# Patient Record
Sex: FEMALE | Race: WHITE | NOT HISPANIC OR LATINO | Employment: OTHER | URBAN - METROPOLITAN AREA
[De-identification: names, ages, dates, MRNs, and addresses within clinical notes are randomized per-mention and may not be internally consistent; named-entity substitution may affect disease eponyms.]

---

## 2017-10-03 ENCOUNTER — TRANSCRIBE ORDERS (OUTPATIENT)
Dept: ADMINISTRATIVE | Facility: HOSPITAL | Age: 82
End: 2017-10-03

## 2017-10-03 DIAGNOSIS — Z12.31 VISIT FOR SCREENING MAMMOGRAM: Primary | ICD-10-CM

## 2017-10-04 ENCOUNTER — HOSPITAL ENCOUNTER (OUTPATIENT)
Dept: MAMMOGRAPHY | Facility: HOSPITAL | Age: 82
Discharge: HOME | End: 2017-10-04
Admitting: INTERNAL MEDICINE

## 2017-10-04 DIAGNOSIS — Z12.31 VISIT FOR SCREENING MAMMOGRAM: ICD-10-CM

## 2017-10-04 PROCEDURE — G0202 SCR MAMMO BI INCL CAD: HCPCS

## 2017-10-04 PROCEDURE — 77063 BREAST TOMOSYNTHESIS BI: CPT

## 2017-10-06 PROCEDURE — 77063 BREAST TOMOSYNTHESIS BI: CPT | Performed by: RADIOLOGY

## 2017-10-06 PROCEDURE — G0202 SCR MAMMO BI INCL CAD: HCPCS | Performed by: RADIOLOGY

## 2018-10-19 ENCOUNTER — TRANSCRIBE ORDERS (OUTPATIENT)
Dept: ADMINISTRATIVE | Facility: HOSPITAL | Age: 83
End: 2018-10-19

## 2018-10-19 DIAGNOSIS — Z12.31 VISIT FOR SCREENING MAMMOGRAM: Primary | ICD-10-CM

## 2018-11-21 ENCOUNTER — HOSPITAL ENCOUNTER (OUTPATIENT)
Dept: MAMMOGRAPHY | Facility: HOSPITAL | Age: 83
Discharge: HOME | End: 2018-11-21
Attending: INTERNAL MEDICINE | Admitting: INTERNAL MEDICINE

## 2018-11-21 DIAGNOSIS — Z12.31 VISIT FOR SCREENING MAMMOGRAM: ICD-10-CM

## 2018-11-21 PROCEDURE — 77067 SCR MAMMO BI INCL CAD: CPT | Performed by: RADIOLOGY

## 2018-11-21 PROCEDURE — 77067 SCR MAMMO BI INCL CAD: CPT

## 2018-11-21 PROCEDURE — 77063 BREAST TOMOSYNTHESIS BI: CPT | Performed by: RADIOLOGY

## 2018-11-21 PROCEDURE — 77063 BREAST TOMOSYNTHESIS BI: CPT

## 2019-01-15 ENCOUNTER — OFFICE VISIT (OUTPATIENT)
Dept: INTERNAL MEDICINE | Facility: CLINIC | Age: 84
End: 2019-01-15

## 2019-01-15 ENCOUNTER — CLINICAL SUPPORT (OUTPATIENT)
Dept: INTERNAL MEDICINE | Facility: CLINIC | Age: 84
End: 2019-01-15

## 2019-01-15 ENCOUNTER — LAB (OUTPATIENT)
Dept: LAB | Facility: HOSPITAL | Age: 84
End: 2019-01-15

## 2019-01-15 VITALS
HEIGHT: 64 IN | HEART RATE: 60 BPM | WEIGHT: 148 LBS | SYSTOLIC BLOOD PRESSURE: 136 MMHG | BODY MASS INDEX: 25.27 KG/M2 | DIASTOLIC BLOOD PRESSURE: 80 MMHG | TEMPERATURE: 97.4 F

## 2019-01-15 DIAGNOSIS — E53.8 B12 DEFICIENCY: ICD-10-CM

## 2019-01-15 DIAGNOSIS — E78.2 MIXED HYPERLIPIDEMIA: ICD-10-CM

## 2019-01-15 DIAGNOSIS — I10 BENIGN ESSENTIAL HYPERTENSION: ICD-10-CM

## 2019-01-15 DIAGNOSIS — N18.30 CKD (CHRONIC KIDNEY DISEASE), STAGE III (HCC): ICD-10-CM

## 2019-01-15 DIAGNOSIS — E11.9 DIABETES MELLITUS WITHOUT COMPLICATION (HCC): Primary | ICD-10-CM

## 2019-01-15 DIAGNOSIS — E11.9 DIABETES MELLITUS WITHOUT COMPLICATION (HCC): ICD-10-CM

## 2019-01-15 DIAGNOSIS — E55.9 VITAMIN D DEFICIENCY: ICD-10-CM

## 2019-01-15 LAB
ALBUMIN SERPL-MCNC: 4.62 G/DL (ref 3.2–4.8)
ALBUMIN/GLOB SERPL: 1.9 G/DL (ref 1.5–2.5)
ALP SERPL-CCNC: 74 U/L (ref 25–100)
ALT SERPL W P-5'-P-CCNC: 15 U/L (ref 7–40)
ANION GAP SERPL CALCULATED.3IONS-SCNC: 8 MMOL/L (ref 3–11)
ARTICHOKE IGE QN: 154 MG/DL (ref 0–130)
AST SERPL-CCNC: 17 U/L (ref 0–33)
BILIRUB SERPL-MCNC: 0.3 MG/DL (ref 0.3–1.2)
BUN BLD-MCNC: 19 MG/DL (ref 9–23)
BUN/CREAT SERPL: 17.8 (ref 7–25)
CALCIUM SPEC-SCNC: 10.2 MG/DL (ref 8.7–10.4)
CHLORIDE SERPL-SCNC: 103 MMOL/L (ref 99–109)
CHOLEST SERPL-MCNC: 207 MG/DL (ref 0–200)
CO2 SERPL-SCNC: 25 MMOL/L (ref 20–31)
CREAT BLD-MCNC: 1.07 MG/DL (ref 0.6–1.3)
GFR SERPL CREATININE-BSD FRML MDRD: 49 ML/MIN/1.73
GLOBULIN UR ELPH-MCNC: 2.4 GM/DL
GLUCOSE BLD-MCNC: 112 MG/DL (ref 70–100)
HBA1C MFR BLD: 7.2 % (ref 4.8–5.6)
HDLC SERPL-MCNC: 51 MG/DL (ref 40–60)
POTASSIUM BLD-SCNC: 4.8 MMOL/L (ref 3.5–5.5)
PROT SERPL-MCNC: 7 G/DL (ref 5.7–8.2)
SODIUM BLD-SCNC: 136 MMOL/L (ref 132–146)
TRIGL SERPL-MCNC: 187 MG/DL (ref 0–150)
VIT B12 BLD-MCNC: 527 PG/ML (ref 211–911)

## 2019-01-15 PROCEDURE — 80053 COMPREHEN METABOLIC PANEL: CPT

## 2019-01-15 PROCEDURE — 96372 THER/PROPH/DIAG INJ SC/IM: CPT | Performed by: INTERNAL MEDICINE

## 2019-01-15 PROCEDURE — 82607 VITAMIN B-12: CPT

## 2019-01-15 PROCEDURE — 36415 COLL VENOUS BLD VENIPUNCTURE: CPT

## 2019-01-15 PROCEDURE — 99214 OFFICE O/P EST MOD 30 MIN: CPT | Performed by: INTERNAL MEDICINE

## 2019-01-15 PROCEDURE — 83036 HEMOGLOBIN GLYCOSYLATED A1C: CPT

## 2019-01-15 PROCEDURE — 80061 LIPID PANEL: CPT

## 2019-01-15 RX ORDER — HYDROXYZINE HYDROCHLORIDE 25 MG/1
25 TABLET, FILM COATED ORAL 3 TIMES DAILY PRN
Qty: 90 TABLET | Refills: 3 | Status: SHIPPED | OUTPATIENT
Start: 2019-01-15 | End: 2019-07-25 | Stop reason: SDUPTHER

## 2019-01-15 RX ORDER — ASPIRIN 81 MG/1
81 TABLET ORAL DAILY
COMMUNITY

## 2019-01-15 RX ORDER — PROMETHAZINE HYDROCHLORIDE 25 MG/1
TABLET ORAL
COMMUNITY
Start: 2018-11-17 | End: 2020-01-09 | Stop reason: SDUPTHER

## 2019-01-15 RX ORDER — LISINOPRIL 2.5 MG/1
2.5 TABLET ORAL DAILY
COMMUNITY
Start: 2018-12-18 | End: 2019-07-25 | Stop reason: SDUPTHER

## 2019-01-15 RX ORDER — TIZANIDINE 2 MG/1
4 TABLET ORAL NIGHTLY PRN
Qty: 30 TABLET | Refills: 5 | Status: SHIPPED | OUTPATIENT
Start: 2019-01-15 | End: 2019-01-15

## 2019-01-15 RX ORDER — ALPRAZOLAM 0.5 MG/1
TABLET ORAL
COMMUNITY
Start: 2019-01-04 | End: 2019-04-29 | Stop reason: SDUPTHER

## 2019-01-15 RX ORDER — CHOLECALCIFEROL (VITAMIN D3) 125 MCG
CAPSULE ORAL
COMMUNITY

## 2019-01-15 RX ORDER — ATORVASTATIN CALCIUM 10 MG/1
10 TABLET, FILM COATED ORAL 3 TIMES WEEKLY
COMMUNITY
Start: 2018-11-17 | End: 2019-07-05 | Stop reason: SDUPTHER

## 2019-01-15 RX ORDER — HYDROXYZINE HYDROCHLORIDE 25 MG/1
25 TABLET, FILM COATED ORAL 3 TIMES DAILY PRN
COMMUNITY
End: 2019-01-15 | Stop reason: SDUPTHER

## 2019-01-15 RX ORDER — TIZANIDINE 2 MG/1
2 TABLET ORAL NIGHTLY PRN
COMMUNITY
End: 2019-01-15 | Stop reason: SDUPTHER

## 2019-01-15 RX ORDER — CYANOCOBALAMIN 1000 UG/ML
1000 INJECTION, SOLUTION INTRAMUSCULAR; SUBCUTANEOUS
Status: DISCONTINUED | OUTPATIENT
Start: 2019-01-15 | End: 2023-02-13

## 2019-01-15 RX ORDER — TIZANIDINE 4 MG/1
4 TABLET ORAL NIGHTLY
Qty: 30 TABLET | Refills: 5 | Status: SHIPPED | OUTPATIENT
Start: 2019-01-15 | End: 2019-07-25 | Stop reason: SDUPTHER

## 2019-01-15 RX ORDER — ATENOLOL 50 MG/1
50 TABLET ORAL DAILY
COMMUNITY
Start: 2018-12-18 | End: 2019-07-25 | Stop reason: SDUPTHER

## 2019-01-15 RX ADMIN — CYANOCOBALAMIN 1000 MCG: 1000 INJECTION, SOLUTION INTRAMUSCULAR; SUBCUTANEOUS at 11:10

## 2019-01-15 NOTE — PROGRESS NOTES
[facility]     Tessa Adler  1935   [id number]      Patient Care Team:  Kishore Grande MD as PCP - General  Kishore Grande MD as PCP - Family Medicine    Chief Complaint::   Chief Complaint   Patient presents with   • Follow-up     fasting 6 month    • Diabetes        HPI  Pat is now 83.  She comes in for follow up of her chronic pancreatitis, HTN, hyperlipidemia, B12, deficieny, DM, and CKD.  She continue to experience abdominal pain.  She also has LE muscle spasms, which she attributes to her statin.  She takes tizanidine, which seems to help, but she would like to increase the dose.  She continues to have problems with her hands, had injections recently and feels better.      Chronic Conditions:      Patient Active Problem List   Diagnosis   • B12 deficiency   • Mixed hyperlipidemia   • Diabetes mellitus without complication (CMS/HCC)   • GERD without esophagitis   • Benign essential hypertension   • ASHD (arteriosclerotic heart disease)   • Vitamin D deficiency   • CKD (chronic kidney disease), stage III (CMS/HCC)        Past Medical History:   Diagnosis Date   • ASHD (arteriosclerotic heart disease)    • B12 deficiency    • Benign essential hypertension    • CKD (chronic kidney disease), stage III (CMS/HCC)    • Diabetes mellitus without complication (CMS/HCC)    • GERD without esophagitis    • Mixed hyperlipidemia    • Vitamin D deficiency        Past Surgical History:   Procedure Laterality Date   • CARPAL TUNNEL RELEASE  1989   • CATARACT EXTRACTION     • CHOLECYSTECTOMY  2001   • CORONARY ANGIOPLASTY WITH STENT PLACEMENT  2010    PTCA, stents, LAD   • HEMORRHOIDECTOMY  1965   • HYSTERECTOMY  1968    AGE 33   • OOPHORECTOMY Bilateral     AGE 33       Family History   Problem Relation Age of Onset   • Breast cancer Neg Hx    • Ovarian cancer Neg Hx        Social History     Socioeconomic History   • Marital status:      Spouse name: Not on file   • Number of  children: Not on file   • Years of education: Not on file   • Highest education level: Not on file   Social Needs   • Financial resource strain: Not on file   • Food insecurity - worry: Not on file   • Food insecurity - inability: Not on file   • Transportation needs - medical: Not on file   • Transportation needs - non-medical: Not on file   Occupational History   • Not on file   Tobacco Use   • Smoking status: Never Smoker   • Smokeless tobacco: Never Used   Substance and Sexual Activity   • Alcohol use: No     Frequency: Never   • Drug use: No   • Sexual activity: Defer   Other Topics Concern   • Not on file   Social History Narrative   • Not on file       Allergies   Allergen Reactions   • Clarithromycin Unknown (See Comments)     unknown   • Codeine Unknown (See Comments)     unknown   • Dobutamine Unknown (See Comments)     unknown   • Iodinated Diagnostic Agents Unknown (See Comments)     unknown   • Ioversol Swelling     Tongue swelling   • Pantoprazole Sodium Unknown (See Comments)     unknown   • Perflutren Lipid Microsphere Unknown (See Comments)     unknown   • Sulfa Antibiotics Unknown (See Comments)     unknown   • Trimethoprim Unknown (See Comments)     unknown         Current Outpatient Medications:   •  ALPRAZolam (XANAX) 0.5 MG tablet, Take one at bedtime, Disp: , Rfl:   •  aspirin 81 MG EC tablet, Take 81 mg by mouth Daily. Take one daily, Disp: , Rfl:   •  atenolol (TENORMIN) 50 MG tablet, Take one daily, Disp: , Rfl:   •  atorvastatin (LIPITOR) 10 MG tablet, Take one tablet three times a week, Disp: , Rfl:   •  Cholecalciferol (VITAMIN D3) 2000 units tablet, Take  by mouth. Take one daily, Disp: , Rfl:   •  Cyanocobalamin 1000 MCG/ML kit, Inject  as directed. Take injection once a month, Disp: , Rfl:   •  hydrOXYzine (ATARAX) 25 MG tablet, Take 1 tablet by mouth 3 (Three) Times a Day As Needed for Itching., Disp: 90 tablet, Rfl: 3  •  lisinopril (PRINIVIL,ZESTRIL) 2.5 MG tablet, Take one daily,  "Disp: , Rfl:   •  metFORMIN (GLUCOPHAGE) 1000 MG tablet, Take one twice daily, Disp: , Rfl:   •  promethazine (PHENERGAN) 25 MG tablet, Take as needed for nausea and vomiting, Disp: , Rfl:   •  tiZANidine (ZANAFLEX) 4 MG tablet, Take 1 tablet by mouth Every Night., Disp: 30 tablet, Rfl: 5    Current Facility-Administered Medications:   •  cyanocobalamin injection 1,000 mcg, 1,000 mcg, Intramuscular, Q28 Days, Kishore Grande MD, 1,000 mcg at 01/15/19 1110    Review of Systems   Constitutional: Negative.  Negative for chills, fatigue and fever.   HENT: Negative for congestion, ear pain and sinus pressure.    Respiratory: Negative.  Negative for cough, chest tightness, shortness of breath and wheezing.    Cardiovascular: Negative.  Negative for chest pain and palpitations.   Gastrointestinal: Negative for abdominal pain, blood in stool, constipation and diarrhea.   Musculoskeletal: Positive for arthralgias and myalgias.   Skin: Negative for color change.   Allergic/Immunologic: Negative for environmental allergies.   Neurological: Negative for dizziness, speech difficulty and headache.   Psychiatric/Behavioral: Negative for decreased concentration. The patient is not nervous/anxious.         Vital Signs  Vitals:    01/15/19 0936   BP: 136/80   BP Location: Left arm   Patient Position: Sitting   Cuff Size: Adult   Pulse: 60   Temp: 97.4 °F (36.3 °C)   TempSrc: Temporal   Weight: 67.1 kg (148 lb)   Height: 162.6 cm (64\")   PainSc: 0-No pain       Physical Exam   Constitutional: She is oriented to person, place, and time. She appears well-developed and well-nourished.   HENT:   Head: Normocephalic and atraumatic.   Cardiovascular: Normal rate, regular rhythm and normal heart sounds.   No murmur heard.  Pulmonary/Chest: Effort normal and breath sounds normal.   Abdominal: Soft. Bowel sounds are normal. There is no tenderness.   Musculoskeletal:   She has thickened tendon sheaths on the palmar surfaces of both " hands.    Neurological: She is alert and oriented to person, place, and time.   Psychiatric: She has a normal mood and affect.   Vitals reviewed.     Procedures      Assessment/Plan:   1)  Diabetes:  A1c pending  2)  Chronic pancreatitis per GI  3)  HTN controlled  4) Hyperlipidemia  5)  Depression  6)  Fibromyalgia  7)  CKD stage 3      Plan of care reviewed with patient at the conclusion of today's visit. Education was provided regarding diagnosis, management, and any prescribed or recommended OTC medications.Patient verbalizes understanding of and agreement with management plan.         Kishore Grande MD

## 2019-02-13 ENCOUNTER — CLINICAL SUPPORT (OUTPATIENT)
Dept: INTERNAL MEDICINE | Facility: CLINIC | Age: 84
End: 2019-02-13

## 2019-02-13 DIAGNOSIS — E53.8 B12 DEFICIENCY: ICD-10-CM

## 2019-02-13 PROCEDURE — 96372 THER/PROPH/DIAG INJ SC/IM: CPT | Performed by: INTERNAL MEDICINE

## 2019-02-13 RX ADMIN — CYANOCOBALAMIN 1000 MCG: 1000 INJECTION, SOLUTION INTRAMUSCULAR; SUBCUTANEOUS at 12:52

## 2019-03-13 ENCOUNTER — CLINICAL SUPPORT (OUTPATIENT)
Dept: INTERNAL MEDICINE | Facility: CLINIC | Age: 84
End: 2019-03-13

## 2019-03-13 PROCEDURE — 96372 THER/PROPH/DIAG INJ SC/IM: CPT | Performed by: INTERNAL MEDICINE

## 2019-03-13 RX ADMIN — CYANOCOBALAMIN 1000 MCG: 1000 INJECTION, SOLUTION INTRAMUSCULAR; SUBCUTANEOUS at 11:15

## 2019-04-10 ENCOUNTER — CLINICAL SUPPORT (OUTPATIENT)
Dept: INTERNAL MEDICINE | Facility: CLINIC | Age: 84
End: 2019-04-10

## 2019-04-10 DIAGNOSIS — E53.8 B12 DEFICIENCY: ICD-10-CM

## 2019-04-10 PROCEDURE — 96372 THER/PROPH/DIAG INJ SC/IM: CPT | Performed by: INTERNAL MEDICINE

## 2019-04-10 RX ADMIN — CYANOCOBALAMIN 1000 MCG: 1000 INJECTION, SOLUTION INTRAMUSCULAR; SUBCUTANEOUS at 11:11

## 2019-04-29 RX ORDER — ALPRAZOLAM 0.5 MG/1
0.5 TABLET ORAL NIGHTLY PRN
Qty: 30 TABLET | Refills: 1 | Status: SHIPPED | OUTPATIENT
Start: 2019-04-29 | End: 2019-07-05 | Stop reason: SDUPTHER

## 2019-04-29 NOTE — TELEPHONE ENCOUNTER
Last seen-   01/15/2019    Next appointment-  07/05/2019    Last approved-   01/25/2019    Shadi-   viral

## 2019-05-08 ENCOUNTER — CLINICAL SUPPORT (OUTPATIENT)
Dept: INTERNAL MEDICINE | Facility: CLINIC | Age: 84
End: 2019-05-08

## 2019-05-08 DIAGNOSIS — E53.8 B12 DEFICIENCY: ICD-10-CM

## 2019-05-08 PROCEDURE — 96372 THER/PROPH/DIAG INJ SC/IM: CPT | Performed by: INTERNAL MEDICINE

## 2019-05-08 RX ADMIN — CYANOCOBALAMIN 1000 MCG: 1000 INJECTION, SOLUTION INTRAMUSCULAR; SUBCUTANEOUS at 12:28

## 2019-05-22 ENCOUNTER — HOSPITAL ENCOUNTER (OUTPATIENT)
Dept: GENERAL RADIOLOGY | Facility: HOSPITAL | Age: 84
Discharge: HOME | End: 2019-05-22
Admitting: NURSE PRACTITIONER

## 2019-05-22 ENCOUNTER — OFFICE VISIT (OUTPATIENT)
Dept: INTERNAL MEDICINE | Facility: CLINIC | Age: 84
End: 2019-05-22

## 2019-05-22 VITALS
SYSTOLIC BLOOD PRESSURE: 122 MMHG | HEART RATE: 68 BPM | BODY MASS INDEX: 25.61 KG/M2 | HEIGHT: 64 IN | DIASTOLIC BLOOD PRESSURE: 80 MMHG | WEIGHT: 150 LBS

## 2019-05-22 DIAGNOSIS — S80.211A ABRASION OF RIGHT KNEE, INITIAL ENCOUNTER: ICD-10-CM

## 2019-05-22 DIAGNOSIS — M25.561 ACUTE PAIN OF RIGHT KNEE: Primary | ICD-10-CM

## 2019-05-22 DIAGNOSIS — M25.561 ACUTE PAIN OF RIGHT KNEE: ICD-10-CM

## 2019-05-22 PROCEDURE — 73560 X-RAY EXAM OF KNEE 1 OR 2: CPT

## 2019-05-22 PROCEDURE — 99213 OFFICE O/P EST LOW 20 MIN: CPT | Performed by: NURSE PRACTITIONER

## 2019-05-22 NOTE — PROGRESS NOTES
Tessa Adler  1935  [id number]  Patient Care Team:  Kishore Grande MD as PCP - General  Kishore Grande MD as PCP - Family Medicine    Tessa Adler is a pleasant 83 y.o. female who presents for evaluation of Fall (x 1 week); Wound Infection (on elbow and knee, not healing); and Knee Pain (increased after fall)      Chief Complaint   Patient presents with   • Fall     x 1 week   • Wound Infection     on elbow and knee, not healing   • Knee Pain     increased after fall       HPI:   Right knee abrasion s/p fall 1 week ago, knee gave out and she went down on knee on [place].  Sees ortho for shots which she is due for.  Last one 3 yrs ago.  Now abrasion seems infected, area of erythema spreading x 3 days, no drainage.  Using peroxide, neosporin and Band-Aid.  Using tylenol.  Also has mild abrasion right elbow, healing.    Right knee bruised, painful to touch and ambulate, swelling has improved.      Past Medical History:   Diagnosis Date   • ASHD (arteriosclerotic heart disease)    • B12 deficiency    • Benign essential hypertension    • CKD (chronic kidney disease), stage III (CMS/HCC)    • Diabetes mellitus without complication (CMS/HCC)    • GERD without esophagitis    • Mixed hyperlipidemia    • Vitamin D deficiency        Past Surgical History:   Procedure Laterality Date   • CARPAL TUNNEL RELEASE  1989   • CATARACT EXTRACTION     • CHOLECYSTECTOMY  2001   • CORONARY ANGIOPLASTY WITH STENT PLACEMENT  2010    PTCA, stents, LAD   • HEMORRHOIDECTOMY  1965   • HYSTERECTOMY  1968    AGE 33   • OOPHORECTOMY Bilateral     AGE 33       Family History   Problem Relation Age of Onset   • Breast cancer Neg Hx    • Ovarian cancer Neg Hx        Social History     Tobacco Use   Smoking Status Never Smoker   Smokeless Tobacco Never Used       Allergies   Allergen Reactions   • Clarithromycin Unknown (See Comments)     unknown   • Codeine Unknown (See Comments)     unknown   • Dobutamine  "Unknown (See Comments)     unknown   • Iodinated Diagnostic Agents Unknown (See Comments)     unknown   • Ioversol Swelling     Tongue swelling   • Pantoprazole Sodium Unknown (See Comments)     unknown   • Perflutren Lipid Microsphere Unknown (See Comments)     unknown   • Sulfa Antibiotics Unknown (See Comments)     unknown   • Trimethoprim Unknown (See Comments)     unknown       Review of Systems   Constitutional: Negative for chills, fatigue and fever.   HENT: Negative for congestion, ear pain and sinus pressure.    Respiratory: Negative for cough, chest tightness, shortness of breath and wheezing.    Cardiovascular: Negative for chest pain and palpitations.   Gastrointestinal: Negative for abdominal pain, blood in stool and constipation.   Skin: Negative for color change.   Allergic/Immunologic: Negative for environmental allergies.   Neurological: Negative for dizziness, speech difficulty and headache.   Psychiatric/Behavioral: Negative for decreased concentration. The patient is not nervous/anxious.        Vitals:    05/22/19 1005   BP: 122/80   BP Location: Left arm   Patient Position: Sitting   Cuff Size: Adult   Pulse: 68   Weight: 68 kg (150 lb)   Height: 162.6 cm (64\")   PainSc:   9   PainLoc: Knee         Current Outpatient Medications:   •  ALPRAZolam (XANAX) 0.5 MG tablet, Take 1 tablet by mouth At Night As Needed for Anxiety. Take one at bedtime, Disp: 30 tablet, Rfl: 1  •  aspirin 81 MG EC tablet, Take 81 mg by mouth Daily. Take one daily, Disp: , Rfl:   •  atenolol (TENORMIN) 50 MG tablet, Take 50 mg by mouth Daily. Take one daily, Disp: , Rfl:   •  atorvastatin (LIPITOR) 10 MG tablet, Take 10 mg by mouth 3 (Three) Times a Week. Take one tablet three times a week, Disp: , Rfl:   •  Cholecalciferol (VITAMIN D3) 2000 units tablet, Take  by mouth. Take one daily, Disp: , Rfl:   •  Cyanocobalamin 1000 MCG/ML kit, Inject  as directed. Take injection once a month, Disp: , Rfl:   •  hydrOXYzine (ATARAX) " 25 MG tablet, Take 1 tablet by mouth 3 (Three) Times a Day As Needed for Itching., Disp: 90 tablet, Rfl: 3  •  lisinopril (PRINIVIL,ZESTRIL) 2.5 MG tablet, Take 2.5 mg by mouth Daily. Take one daily, Disp: , Rfl:   •  metFORMIN (GLUCOPHAGE) 1000 MG tablet, Take 1,000 mg by mouth 2 (Two) Times a Day With Meals. Take one twice daily, Disp: , Rfl:   •  promethazine (PHENERGAN) 25 MG tablet, Take as needed for nausea and vomiting, Disp: , Rfl:   •  tiZANidine (ZANAFLEX) 4 MG tablet, Take 1 tablet by mouth Every Night., Disp: 30 tablet, Rfl: 5  •  mupirocin (BACTROBAN) 2 % ointment, Apply  topically to the appropriate area as directed 3 (Three) Times a Day., Disp: 30 g, Rfl: 0    Current Facility-Administered Medications:   •  cyanocobalamin injection 1,000 mcg, 1,000 mcg, Intramuscular, Q28 Days, Kishore Grande MD, 1,000 mcg at 05/08/19 1228    Physical Exam   Constitutional: She appears well-developed and well-nourished.   Pulmonary/Chest: Effort normal.   Musculoskeletal:        Right knee: She exhibits swelling, erythema and bony tenderness. She exhibits normal range of motion. Tenderness found. Patellar tendon tenderness noted.   Skin: Skin is warm and dry. Abrasion noted.        Psychiatric: She has a normal mood and affect. Her behavior is normal.   Vitals reviewed.      Assessment/Plan:    Problem List Items Addressed This Visit     None      Visit Diagnoses     Acute pain of right knee    -  Primary    Relevant Orders    XR Knee 1 or 2 View Right    Abrasion of right knee, initial encounter        Relevant Medications    mupirocin (BACTROBAN) 2 % ointment          Plan of care reviewed with patient at the conclusion of today's visit. Education was provided regarding diagnosis, management and any prescribed or recommended OTC medications.  Patient verbalizes understanding of and agreement with management plan.    Return if symptoms worsen or fail to improve.    *Note that portions of this note were  completed with a voice recognition program.  Efforts were made to edit the dictation but occasionally words are transcribed.    Sandrine Barry, APRN

## 2019-05-22 NOTE — PATIENT INSTRUCTIONS
Stop neosporin, use mupirocin ointment x 5 days.  Continue soap and water cleansing.  Do not need to cover unless you wish to.

## 2019-06-12 ENCOUNTER — CLINICAL SUPPORT (OUTPATIENT)
Dept: INTERNAL MEDICINE | Facility: CLINIC | Age: 84
End: 2019-06-12

## 2019-06-12 PROCEDURE — 96372 THER/PROPH/DIAG INJ SC/IM: CPT | Performed by: INTERNAL MEDICINE

## 2019-06-12 RX ADMIN — CYANOCOBALAMIN 1000 MCG: 1000 INJECTION, SOLUTION INTRAMUSCULAR; SUBCUTANEOUS at 12:25

## 2019-06-24 PROBLEM — IMO0002 TRIGGER FINGER OF BOTH HANDS: Status: ACTIVE | Noted: 2019-06-24

## 2019-06-24 PROBLEM — K58.9 IRRITABLE BOWEL SYNDROME: Status: ACTIVE | Noted: 2019-06-24

## 2019-06-24 PROBLEM — E78.5 HYPERLIPIDEMIA DUE TO TYPE 2 DIABETES MELLITUS: Status: ACTIVE | Noted: 2019-06-24

## 2019-06-24 PROBLEM — Z00.00 MEDICARE ANNUAL WELLNESS VISIT, SUBSEQUENT: Status: ACTIVE | Noted: 2019-06-24

## 2019-06-24 PROBLEM — M19.90 IDIOPATHIC OSTEOARTHRITIS: Status: ACTIVE | Noted: 2019-06-24

## 2019-06-24 PROBLEM — G47.00 INSOMNIA: Status: ACTIVE | Noted: 2019-06-24

## 2019-06-24 PROBLEM — E11.69 HYPERLIPIDEMIA DUE TO TYPE 2 DIABETES MELLITUS (HCC): Status: ACTIVE | Noted: 2019-06-24

## 2019-06-24 PROBLEM — Z00.00 ANNUAL PHYSICAL EXAM: Status: ACTIVE | Noted: 2019-06-24

## 2019-06-24 PROBLEM — E11.21 DIABETIC NEPHROPATHY ASSOCIATED WITH TYPE 2 DIABETES MELLITUS: Status: ACTIVE | Noted: 2019-06-24

## 2019-06-24 PROBLEM — J02.9 PHARYNGITIS: Status: ACTIVE | Noted: 2019-06-24

## 2019-06-24 PROBLEM — M19.90 OSTEOARTHRITIS: Status: ACTIVE | Noted: 2019-06-24

## 2019-06-24 PROBLEM — M81.0 POSTMENOPAUSAL OSTEOPOROSIS: Status: ACTIVE | Noted: 2019-06-24

## 2019-06-24 PROBLEM — J06.9 VIRAL UPPER RESPIRATORY INFECTION: Status: ACTIVE | Noted: 2019-06-24

## 2019-07-01 ENCOUNTER — TELEPHONE (OUTPATIENT)
Dept: INTERNAL MEDICINE | Facility: CLINIC | Age: 84
End: 2019-07-01

## 2019-07-03 DIAGNOSIS — E53.8 B12 DEFICIENCY: ICD-10-CM

## 2019-07-03 DIAGNOSIS — E11.9 DIABETES MELLITUS WITHOUT COMPLICATION (HCC): ICD-10-CM

## 2019-07-03 DIAGNOSIS — E78.2 MIXED HYPERLIPIDEMIA: Primary | ICD-10-CM

## 2019-07-03 DIAGNOSIS — I10 BENIGN ESSENTIAL HYPERTENSION: ICD-10-CM

## 2019-07-03 DIAGNOSIS — E55.9 VITAMIN D DEFICIENCY: ICD-10-CM

## 2019-07-05 ENCOUNTER — LAB (OUTPATIENT)
Dept: LAB | Facility: HOSPITAL | Age: 84
End: 2019-07-05

## 2019-07-05 ENCOUNTER — OFFICE VISIT (OUTPATIENT)
Dept: INTERNAL MEDICINE | Facility: CLINIC | Age: 84
End: 2019-07-05

## 2019-07-05 VITALS
WEIGHT: 151 LBS | HEIGHT: 64 IN | HEART RATE: 64 BPM | BODY MASS INDEX: 25.78 KG/M2 | SYSTOLIC BLOOD PRESSURE: 118 MMHG | DIASTOLIC BLOOD PRESSURE: 72 MMHG

## 2019-07-05 DIAGNOSIS — Z00.00 WELL ADULT EXAM: Primary | ICD-10-CM

## 2019-07-05 DIAGNOSIS — E53.8 B12 DEFICIENCY: ICD-10-CM

## 2019-07-05 DIAGNOSIS — I10 BENIGN ESSENTIAL HYPERTENSION: ICD-10-CM

## 2019-07-05 DIAGNOSIS — E11.9 DIABETES MELLITUS WITHOUT COMPLICATION (HCC): ICD-10-CM

## 2019-07-05 DIAGNOSIS — E78.2 MIXED HYPERLIPIDEMIA: ICD-10-CM

## 2019-07-05 DIAGNOSIS — E55.9 VITAMIN D DEFICIENCY: ICD-10-CM

## 2019-07-05 LAB
25(OH)D3 SERPL-MCNC: 35.7 NG/ML (ref 30–100)
ALBUMIN SERPL-MCNC: 4.4 G/DL (ref 3.5–5.2)
ALBUMIN UR-MCNC: <1.2 MG/L
ALBUMIN/GLOB SERPL: 1.3 G/DL
ALP SERPL-CCNC: 70 U/L (ref 39–117)
ALT SERPL W P-5'-P-CCNC: 11 U/L (ref 1–33)
ANION GAP SERPL CALCULATED.3IONS-SCNC: 12.8 MMOL/L (ref 5–15)
AST SERPL-CCNC: 14 U/L (ref 1–32)
BASOPHILS # BLD AUTO: 0.08 10*3/MM3 (ref 0–0.2)
BASOPHILS NFR BLD AUTO: 0.8 % (ref 0–1.5)
BILIRUB SERPL-MCNC: 0.3 MG/DL (ref 0.2–1.2)
BUN BLD-MCNC: 19 MG/DL (ref 8–23)
BUN/CREAT SERPL: 17.8 (ref 7–25)
CALCIUM SPEC-SCNC: 10.4 MG/DL (ref 8.6–10.5)
CHLORIDE SERPL-SCNC: 97 MMOL/L (ref 98–107)
CHOLEST SERPL-MCNC: 186 MG/DL (ref 0–200)
CO2 SERPL-SCNC: 23.2 MMOL/L (ref 22–29)
CREAT BLD-MCNC: 1.07 MG/DL (ref 0.57–1)
DEPRECATED RDW RBC AUTO: 46.2 FL (ref 37–54)
EOSINOPHIL # BLD AUTO: 0.36 10*3/MM3 (ref 0–0.4)
EOSINOPHIL NFR BLD AUTO: 3.6 % (ref 0.3–6.2)
ERYTHROCYTE [DISTWIDTH] IN BLOOD BY AUTOMATED COUNT: 14.1 % (ref 12.3–15.4)
GFR SERPL CREATININE-BSD FRML MDRD: 49 ML/MIN/1.73
GLOBULIN UR ELPH-MCNC: 3.5 GM/DL
GLUCOSE BLD-MCNC: 114 MG/DL (ref 65–99)
HBA1C MFR BLD: 7.11 % (ref 4.8–5.6)
HCT VFR BLD AUTO: 42.6 % (ref 34–46.6)
HDLC SERPL-MCNC: 47 MG/DL (ref 40–60)
HGB BLD-MCNC: 13.4 G/DL (ref 12–15.9)
IMM GRANULOCYTES # BLD AUTO: 0.1 10*3/MM3 (ref 0–0.05)
IMM GRANULOCYTES NFR BLD AUTO: 1 % (ref 0–0.5)
LDLC SERPL CALC-MCNC: 79 MG/DL (ref 0–100)
LDLC/HDLC SERPL: 1.69 {RATIO}
LYMPHOCYTES # BLD AUTO: 3.23 10*3/MM3 (ref 0.7–3.1)
LYMPHOCYTES NFR BLD AUTO: 32.6 % (ref 19.6–45.3)
MCH RBC QN AUTO: 28.3 PG (ref 26.6–33)
MCHC RBC AUTO-ENTMCNC: 31.5 G/DL (ref 31.5–35.7)
MCV RBC AUTO: 89.9 FL (ref 79–97)
MONOCYTES # BLD AUTO: 0.66 10*3/MM3 (ref 0.1–0.9)
MONOCYTES NFR BLD AUTO: 6.7 % (ref 5–12)
NEUTROPHILS # BLD AUTO: 5.49 10*3/MM3 (ref 1.7–7)
NEUTROPHILS NFR BLD AUTO: 55.3 % (ref 42.7–76)
NRBC BLD AUTO-RTO: 0.1 /100 WBC (ref 0–0.2)
PLATELET # BLD AUTO: 326 10*3/MM3 (ref 140–450)
PMV BLD AUTO: 10.6 FL (ref 6–12)
POTASSIUM BLD-SCNC: 4.2 MMOL/L (ref 3.5–5.2)
PROT SERPL-MCNC: 7.9 G/DL (ref 6–8.5)
RBC # BLD AUTO: 4.74 10*6/MM3 (ref 3.77–5.28)
SODIUM BLD-SCNC: 133 MMOL/L (ref 136–145)
TRIGL SERPL-MCNC: 298 MG/DL (ref 0–150)
VIT B12 BLD-MCNC: 542 PG/ML (ref 211–946)
VLDLC SERPL-MCNC: 59.6 MG/DL (ref 5–40)
WBC NRBC COR # BLD: 9.92 10*3/MM3 (ref 3.4–10.8)

## 2019-07-05 PROCEDURE — 83036 HEMOGLOBIN GLYCOSYLATED A1C: CPT

## 2019-07-05 PROCEDURE — G0439 PPPS, SUBSEQ VISIT: HCPCS | Performed by: PHYSICIAN ASSISTANT

## 2019-07-05 PROCEDURE — 82306 VITAMIN D 25 HYDROXY: CPT

## 2019-07-05 PROCEDURE — 82607 VITAMIN B-12: CPT

## 2019-07-05 PROCEDURE — 82043 UR ALBUMIN QUANTITATIVE: CPT

## 2019-07-05 PROCEDURE — 85025 COMPLETE CBC W/AUTO DIFF WBC: CPT

## 2019-07-05 PROCEDURE — 80053 COMPREHEN METABOLIC PANEL: CPT

## 2019-07-05 PROCEDURE — 80061 LIPID PANEL: CPT

## 2019-07-05 RX ORDER — ATORVASTATIN CALCIUM 10 MG/1
10 TABLET, FILM COATED ORAL 3 TIMES WEEKLY
Qty: 39 TABLET | Refills: 3 | Status: SHIPPED | OUTPATIENT
Start: 2019-07-05 | End: 2020-09-09 | Stop reason: SDUPTHER

## 2019-07-05 RX ORDER — ALPRAZOLAM 0.5 MG/1
TABLET ORAL
Qty: 30 TABLET | Refills: 1 | OUTPATIENT
Start: 2019-07-05

## 2019-07-05 RX ORDER — ATORVASTATIN CALCIUM 10 MG/1
10 TABLET, FILM COATED ORAL 3 TIMES WEEKLY
Status: CANCELLED | OUTPATIENT
Start: 2019-07-05

## 2019-07-05 RX ORDER — ALPRAZOLAM 0.5 MG/1
0.5 TABLET ORAL NIGHTLY PRN
Qty: 30 TABLET | Refills: 1 | Status: CANCELLED | OUTPATIENT
Start: 2019-07-05

## 2019-07-05 RX ORDER — BLOOD-GLUCOSE METER
1 KIT MISCELLANEOUS DAILY
Refills: 11 | COMMUNITY
Start: 2019-06-12 | End: 2020-04-10

## 2019-07-05 RX ORDER — ALPRAZOLAM 0.5 MG/1
0.5 TABLET ORAL NIGHTLY PRN
Qty: 30 TABLET | Refills: 2 | Status: SHIPPED | OUTPATIENT
Start: 2019-07-05 | End: 2019-07-25 | Stop reason: SDUPTHER

## 2019-07-05 NOTE — PATIENT INSTRUCTIONS
Medicare Wellness  Personal Prevention Plan of Service     Date of Office Visit:  2019  Encounter Provider:  Bibi Yun PA-C  Place of Service:  [facility]  Patient Name: Tessa Adler  :  1935    As part of the Medicare Wellness portion of your visit today, we are providing you with this personalized preventive plan of services (PPPS). This plan is based upon recommendations of the [place] Preventive Services Task Force (USPSTF) and the Advisory Committee on Immunization Practices (ACIP).    This lists the preventive care services that should be considered, and provides dates of when you are due. Items listed as completed are up-to-date and do not require any further intervention.    Health Maintenance   Topic Date Due   • ZOSTER VACCINE (1 of 2) 1987   • DXA SCAN  2019   • HEMOGLOBIN A1C  07/15/2019   • URINE MICROALBUMIN  2019   • MEDICARE ANNUAL WELLNESS  2019   • INFLUENZA VACCINE  2019   • LIPID PANEL  01/15/2020   • TDAP/TD VACCINES (2 - Td) 2027   • PNEUMOCOCCAL VACCINES (65+ LOW/MEDIUM RISK)  Completed       No orders of the defined types were placed in this encounter.      Return for Next scheduled follow up.        Understanding Your Risk for Falls  Each year, millions of people suffer serious injuries from falls. It is important to understand your risk for falling. Talk with your health care provider about your risk and what you can do to lower it. There are actions you can take at home to lower your risk.  If you do have a serious fall, it is important to tell your health care provider. Falling once raises your risk for falling again.  How can falls affect me?  Serious injuries from falls are common. These include:  · Broken bones. Most hip fractures are caused by falls.  · Traumatic brain injury (TBI). Falls are the most common cause of TBI.    Fear of falling can also cause you to avoid  activities and stay at home. This can make your muscles weaker and actually raise your risk for a fall.  What can increase my risk?  Serious injuries from a fall most often happen to people older than age 65. Children and young adults ages 15-29 are also at higher risk. The more risk factors you have for falling, the higher your risk. Risk factors include:  · Weakness in the lower body.  · Lack (deficiency) of vitamin D.  · Weak bones (osteoporosis).  · Being generally weak or confused due to long-term (chronic) illness.  · Dizziness or balance problems.  · Poor vision.  · Having depression.  · Medicine that causes dizziness or drowsiness. These can include medicines for your blood pressure, heart, anxiety, insomnia, or edema, as well as pain medicines and muscle relaxants.  · Drinking alcohol.  · Foot pain or improper footwear.  · Working at a dangerous job.  · Having had a fall in the past.  · Tripping hazards at home, such as floor clutter or loose rugs, or poor lighting.  · Having pets or clutter in your home.    What actions can I take to lower my risk of falling?  · Maintain physical fitness:  ? Do strength and balance exercises. Consider taking a regular class to build strength and balance. Yoga and corie chi are good options.  ? Have your eyes checked every year and your vision prescription updated as needed.  · Remove all clutter from walkways and stairways, including extension cords.  · Use a cordless phone.  · Do not use throw rugs. Make sure all carpeting is taped or tacked down securely.  · Use good lighting in all rooms. Keep a flashlight near your bed.  · Make sure there is a clear path from your bed to the bathroom. Use night-lights.  · Install grab bars for your tub, shower, and toilet. Use a bath mat in your tub or shower.  · Attach secure railings on both sides of your stairs.  · Repair uneven or broken steps.  · Use a cane or walker as directed by your health care provider.  · Wear nonskid shoes.  Do not wear high heels. Do not walk around the house in socks or slippers.  · Avoid walking on icy or slippery surfaces. Walk on the grass instead of on icy or slick sidewalks. Where you can, use ice melt to get rid of ice on walkways.  Questions to ask your health care provider  · Can you help me evaluate my risk for a fall?  · Do any of my medicines make me more likely to fall?  · Should I take a vitamin D supplement?  · What exercises can I do to improve my strength and balance?  · Should I make an appointment to have my vision checked?  · Do I need a bone density test to check for osteoporosis?  · Would it help to use a cane or a walker?  Where to find more information  · Centers for Disease Control and Prevention, STEADI: cdc.gov  · Community-Based Fall Prevention Programs: cdc.gov  · National [place] [employer] Aging: aw3wbym.ann marie.nih.gov  Contact a health care provider if:  · You fall at home.  · You are afraid of falling at home.  · You feel weak, drowsy, or dizzy at home.  Summary  · People 65 and older are at high risk for falling. However, older people are not the only ones injured in falls. Children and young adults have a higher-than-normal risk, too.  · Talk with your health care provider about your risks for falling and how to lower those risks.  · Taking certain precautions at home can lower your risk for falling.  · If you fall, always tell your health care provider.  This information is not intended to replace advice given to you by your health care provider. Make sure you discuss any questions you have with your health care provider.  Document Released: 08/01/2018 Document Revised: 08/01/2018 Document Reviewed: 08/01/2018  [employer]vier Interactive Patient Education © 2019 [employer]

## 2019-07-05 NOTE — PROGRESS NOTES
Subsequent Medicare Wellness Visit   The ABC's of the Annual Wellness Visit    Chief Complaint   Patient presents with   • Medicare Wellness-subsequent     She is fasting, labs have been done   • Med Refill     xanax and lipitor       HPI:  Tessa Adler, -1935, is a 83 y.o. female who presents for a Subsequent Medicare Wellness Visit.    Recent Hospitalizations:  No hospitalization(s) within the last year..    Current Medical Providers:  Patient Care Team:  Kishore Grande MD as PCP - General  Kishore Grande MD as PCP - Family Medicine    Health Habits and Functional and Cognitive Screening and Depression Screening:  Functional & Cognitive Status 2019   Do you have difficulty preparing food and eating? No   Do you have difficulty bathing yourself, getting dressed or grooming yourself? No   Do you have difficulty using the toilet? No   Do you have difficulty moving around from place to place? No   Do you have trouble with steps or getting out of a bed or a chair? No   In the past year have you fallen or experienced a near fall? No   Current Diet Well Balanced Diet   Dental Exam Up to date   Eye Exam Up to date   Exercise (times per week) 0 times per week   Do you need help using the phone?  No   Are you deaf or do you have serious difficulty hearing?  No   Do you need help with transportation? No   Do you need help shopping? No   Do you need help preparing meals?  No   Do you need help with housework?  No   Do you need help with laundry? No   Do you need help taking your medications? No   Do you need help managing money? No   Do you ever drive or ride in a car without wearing a seat belt? No   Have you felt unusual stress, anger or loneliness in the last month? No   Who do you live with? Spouse   If you need help, do you have trouble finding someone available to you? No   Have you been bothered in the last four weeks by sexual problems? No   Do you have difficulty concentrating,  remembering or making decisions? No       Compared to one year ago, the patient feels her physical health is the same and her mental health is the same.    Depression Screen:  PHQ-2/PHQ-9 Depression Screening 7/5/2019   Little interest or pleasure in doing things 0   Feeling down, depressed, or hopeless 0   Total Score 0         Past Medical/Family/Social History:  The following portions of the patient's history were reviewed and updated as appropriate: allergies, current medications, past family history, past medical history, past social history and past surgical history.    Allergies   Allergen Reactions   • Clarithromycin Unknown (See Comments)     Unknown  Biaxin   • Codeine Unknown (See Comments)     unknown   • Dobutamine Unknown (See Comments)     unknown   • Iodinated Diagnostic Agents Unknown (See Comments)     unknown   • Ioversol Swelling     Tongue swelling  Contrast Dye    • Pantoprazole Sodium Unknown (See Comments)     Unknown  Protonix    • Perflutren Lipid Microsphere Unknown (See Comments)     Unknown  Definity    • Sulfa Antibiotics Unknown (See Comments)     Unknown  Bactrim   • Trimethoprim Unknown (See Comments)     Unknown  Bactrim         Current Outpatient Medications:   •  ALPRAZolam (XANAX) 0.5 MG tablet, Take 1 tablet by mouth At Night As Needed for Anxiety. Take one at bedtime, Disp: 30 tablet, Rfl: 1  •  aspirin 81 MG EC tablet, Take 81 mg by mouth Daily. Take one daily, Disp: , Rfl:   •  atenolol (TENORMIN) 50 MG tablet, Take 50 mg by mouth Daily. Take one daily, Disp: , Rfl:   •  atorvastatin (LIPITOR) 10 MG tablet, Take 1 tablet by mouth 3 (Three) Times a Week. Take one tablet three times a week, Disp: 39 tablet, Rfl: 3  •  Cholecalciferol (VITAMIN D3) 2000 units tablet, Take  by mouth. Take one daily, Disp: , Rfl:   •  Cyanocobalamin 1000 MCG/ML kit, Inject  as directed. Take injection once a month, Disp: , Rfl:   •  FREESTYLE LITE test strip, 1 each by Other route Daily., Disp: ,  Rfl: 11  •  hydrOXYzine (ATARAX) 25 MG tablet, Take 1 tablet by mouth 3 (Three) Times a Day As Needed for Itching., Disp: 90 tablet, Rfl: 3  •  lisinopril (PRINIVIL,ZESTRIL) 2.5 MG tablet, Take 2.5 mg by mouth Daily. Take one daily, Disp: , Rfl:   •  metFORMIN (GLUCOPHAGE) 1000 MG tablet, Take 1,000 mg by mouth 2 (Two) Times a Day With Meals. Take one twice daily, Disp: , Rfl:   •  mupirocin (BACTROBAN) 2 % ointment, Apply  topically to the appropriate area as directed 3 (Three) Times a Day., Disp: 30 g, Rfl: 0  •  promethazine (PHENERGAN) 25 MG tablet, Take as needed for nausea and vomiting, Disp: , Rfl:   •  tiZANidine (ZANAFLEX) 4 MG tablet, Take 1 tablet by mouth Every Night., Disp: 30 tablet, Rfl: 5    Current Facility-Administered Medications:   •  cyanocobalamin injection 1,000 mcg, 1,000 mcg, Intramuscular, Q28 Days, Kishore Grande MD, 1,000 mcg at 06/12/19 1225    Aspirin use counseling: Taking ASA appropriately as indicated    Current medication list contains no high risk medications.  No harmful drug interactions have been identified.     Family History   Problem Relation Age of Onset   • Breast cancer Neg Hx    • Ovarian cancer Neg Hx        Social History     Tobacco Use   • Smoking status: Never Smoker   • Smokeless tobacco: Never Used   Substance Use Topics   • Alcohol use: No     Frequency: Never       Past Surgical History:   Procedure Laterality Date   • CARPAL TUNNEL RELEASE  1989   • CATARACT EXTRACTION     • CHOLECYSTECTOMY  2001   • CORONARY ANGIOPLASTY WITH STENT PLACEMENT  2010    PTCA, stents, LAD   • HEMORRHOIDECTOMY  1965   • HYSTERECTOMY  1968    AGE 33   • OOPHORECTOMY Bilateral     AGE 33       Patient Active Problem List   Diagnosis   • B12 deficiency   • Mixed hyperlipidemia   • Diabetes mellitus without complication (CMS/HCC)   • GERD without esophagitis   • Benign essential hypertension   • ASHD (arteriosclerotic heart disease)   • Vitamin D deficiency   • CKD (chronic  "kidney disease), stage III (CMS/[facility])   • Annual physical exam   • BMI 25.0-25.9,adult   • Diabetic nephropathy associated with type 2 diabetes mellitus (CMS/[facility])   • Hyperlipidemia due to type 2 diabetes mellitus (CMS/[facility])   • Idiopathic osteoarthritis   • Insomnia   • Irritable bowel syndrome   • Medicare annual wellness visit, subsequent   • Osteoarthritis   • Pharyngitis   • Postmenopausal osteoporosis   • Trigger finger of both hands   • Viral upper respiratory infection       Review of Systems    Objective     Vitals:    19 0926   BP: 118/72   BP Location: Left arm   Patient Position: Sitting   Cuff Size: Adult   Pulse: 64   Weight: 68.5 kg (151 lb)   Height: 162.6 cm (64\")   PainSc: 0-No pain       Patient's Body mass index is 25.92 kg/m². BMI is within normal parameters. No follow-up required..      No exam data present    The patient has no evidence of cognitve impairment.   ATTENTION  What is the year: correct  What is the month of the year: correct  What is the day of the week?: correct  What is the date?: correct  MEMORY  Repeat address three times, only score third attempt: [address] [place], [place]: 7  HOW MANY ANIMALS DID THE PATIENT NAME  Verbal Fluency -- Animal Names (0-25): 17-21  CLOCK DRAWING  Clock Drawing: All Correct  MEMORY RECALL  Tell me what you remember about that name and address we were repeating at the beginnin  ACE TOTAL SCORE  Total ACE Score - <25/30 strongly suggests cognitive impairment; <21/30 almost certainly shows dementia: 29    Physical Exam    Recent Lab Results:     Lab Results   Component Value Date    CHOL 207 (H) 01/15/2019    TRIG 187 (H) 01/15/2019    HDL 51 01/15/2019       Assessment/Plan   Age-appropriate Screening Schedule:  Refer to the list below for future screening recommendations based on patient's age, sex and/or medical conditions.      Health Maintenance   Topic Date Due   • ZOSTER VACCINE (1 of 2) 1987   • DXA SCAN  " 07/01/2019   • HEMOGLOBIN A1C  07/15/2019   • URINE MICROALBUMIN  07/25/2019   • INFLUENZA VACCINE  08/01/2019   • LIPID PANEL  01/15/2020   • TDAP/TD VACCINES (2 - Td) 11/08/2027   • PNEUMOCOCCAL VACCINES (65+ LOW/MEDIUM RISK)  Completed       Medicare Risks and Personalized Health Plan:  cardiovascular risk      CMS-Preventive Services Quick Reference  Medicare Preventive Services Addressed:  Fall Risk assessment done    Advance Care Planning:  Patient does not have an advance directive - not interested in additional information    Diagnoses and all orders for this visit:    1. Well adult exam (Primary)  Comments:  She is already had fasting labs today.  Vaccines up-to-date.  Normal cognitive assessment ACE score 29/30.    Other orders  -     Cancel: atorvastatin (LIPITOR) 10 MG tablet; Take 1 tablet by mouth 3 (Three) Times a Week. Take one tablet three times a week  -     Cancel: ALPRAZolam (XANAX) 0.5 MG tablet; Take 1 tablet by mouth At Night As Needed for Anxiety. Take one at bedtime  Dispense: 30 tablet; Refill: 1  -     atorvastatin (LIPITOR) 10 MG tablet; Take 1 tablet by mouth 3 (Three) Times a Week. Take one tablet three times a week  Dispense: 39 tablet; Refill: 3        An After Visit Summary and PPPS with all of these plans were given to the patient.      Follow Up:  Return for Next scheduled follow up.

## 2019-07-10 ENCOUNTER — CLINICAL SUPPORT (OUTPATIENT)
Dept: INTERNAL MEDICINE | Facility: CLINIC | Age: 84
End: 2019-07-10

## 2019-07-10 DIAGNOSIS — E53.8 B12 DEFICIENCY: ICD-10-CM

## 2019-07-10 PROCEDURE — 96372 THER/PROPH/DIAG INJ SC/IM: CPT | Performed by: INTERNAL MEDICINE

## 2019-07-10 RX ADMIN — CYANOCOBALAMIN 1000 MCG: 1000 INJECTION, SOLUTION INTRAMUSCULAR; SUBCUTANEOUS at 10:08

## 2019-07-25 ENCOUNTER — OFFICE VISIT (OUTPATIENT)
Dept: INTERNAL MEDICINE | Facility: CLINIC | Age: 84
End: 2019-07-25

## 2019-07-25 VITALS
HEART RATE: 64 BPM | WEIGHT: 150 LBS | HEIGHT: 64 IN | SYSTOLIC BLOOD PRESSURE: 132 MMHG | DIASTOLIC BLOOD PRESSURE: 70 MMHG | BODY MASS INDEX: 25.61 KG/M2

## 2019-07-25 DIAGNOSIS — I10 BENIGN ESSENTIAL HYPERTENSION: ICD-10-CM

## 2019-07-25 DIAGNOSIS — I25.10 ASHD (ARTERIOSCLEROTIC HEART DISEASE): ICD-10-CM

## 2019-07-25 DIAGNOSIS — E53.8 B12 DEFICIENCY: ICD-10-CM

## 2019-07-25 DIAGNOSIS — M19.90 IDIOPATHIC OSTEOARTHRITIS: ICD-10-CM

## 2019-07-25 DIAGNOSIS — E11.21 DIABETIC NEPHROPATHY ASSOCIATED WITH TYPE 2 DIABETES MELLITUS (HCC): Primary | ICD-10-CM

## 2019-07-25 DIAGNOSIS — N18.30 CKD (CHRONIC KIDNEY DISEASE), STAGE III (HCC): ICD-10-CM

## 2019-07-25 DIAGNOSIS — E78.2 MIXED HYPERLIPIDEMIA: ICD-10-CM

## 2019-07-25 PROCEDURE — 99214 OFFICE O/P EST MOD 30 MIN: CPT | Performed by: INTERNAL MEDICINE

## 2019-07-25 RX ORDER — TIZANIDINE 4 MG/1
4 TABLET ORAL NIGHTLY
Qty: 30 TABLET | Refills: 5 | Status: SHIPPED | OUTPATIENT
Start: 2019-07-25 | End: 2021-01-22

## 2019-07-25 RX ORDER — HYDROXYZINE HYDROCHLORIDE 25 MG/1
25 TABLET, FILM COATED ORAL 3 TIMES DAILY PRN
Qty: 90 TABLET | Refills: 3 | Status: SHIPPED | OUTPATIENT
Start: 2019-07-25 | End: 2021-01-22 | Stop reason: SDUPTHER

## 2019-07-25 RX ORDER — LORATADINE 10 MG/1
1 CAPSULE, LIQUID FILLED ORAL DAILY
COMMUNITY
End: 2020-09-18

## 2019-07-25 RX ORDER — NITROGLYCERIN 0.4 MG/1
0.4 TABLET SUBLINGUAL AS NEEDED
COMMUNITY
End: 2019-07-25 | Stop reason: SDUPTHER

## 2019-07-25 RX ORDER — LISINOPRIL 2.5 MG/1
2.5 TABLET ORAL DAILY
Qty: 30 TABLET | Refills: 5 | Status: SHIPPED | OUTPATIENT
Start: 2019-07-25 | End: 2020-05-12

## 2019-07-25 RX ORDER — ALPRAZOLAM 0.5 MG/1
TABLET ORAL
Qty: 40 TABLET | Refills: 2 | Status: SHIPPED | OUTPATIENT
Start: 2019-07-25 | End: 2019-11-06 | Stop reason: SDUPTHER

## 2019-07-25 RX ORDER — ATENOLOL 50 MG/1
50 TABLET ORAL DAILY
Qty: 30 TABLET | Refills: 5 | Status: SHIPPED | OUTPATIENT
Start: 2019-07-25 | End: 2020-05-12

## 2019-07-25 RX ORDER — NITROGLYCERIN 0.4 MG/1
0.4 TABLET SUBLINGUAL AS NEEDED
Qty: 25 TABLET | Refills: 5 | Status: SHIPPED | OUTPATIENT
Start: 2019-07-25

## 2019-07-25 NOTE — PROGRESS NOTES
[facility]     Tessa Adler  1935   [id number]      Patient Care Team:  Kishore Grande MD as PCP - General  Kishore Grande MD as PCP - Family Medicine    Chief Complaint::   Chief Complaint   Patient presents with   • Hyperlipidemia   • Hypertension   • Diabetes   • vitamin b 12 deficiency        HPI  Mrs. Velásquez is now 83.  She comes in for follow-up of her diabetes with nephropathy, hypertension, hyperlipidemia, coronary artery disease, B12 deficiency stage III chronic kidney disease and osteoarthritis.  She has ongoing difficulty with pain in trigger fingers in her right hand, swelling and discomfort in her knees, and back pain.  Despite that however she has done all of her housework this morning including sweeper floors make her beds, and says she is going to mow the grass this afternoon.  She has no chest pain or dyspnea.  Despite alprazolam at bedtime, she often does not sleep well.  She states it relaxes her but she still has difficulty getting to sleep because of her  gets up it wakes her up in she is listing for him until he comes back to bed.    Chronic Conditions:      Patient Active Problem List   Diagnosis   • B12 deficiency   • Mixed hyperlipidemia   • Diabetes mellitus without complication (CMS/[facility])   • GERD without esophagitis   • Benign essential hypertension   • ASHD (arteriosclerotic heart disease)   • Vitamin D deficiency   • CKD (chronic kidney disease), stage III (CMS/[facility])   • Annual physical exam   • BMI 25.0-25.9,adult   • Diabetic nephropathy associated with type 2 diabetes mellitus (CMS/HCC)   • Hyperlipidemia due to type 2 diabetes mellitus (CMS/[facility])   • Idiopathic osteoarthritis   • Insomnia   • Irritable bowel syndrome   • Medicare annual wellness visit, subsequent   • Osteoarthritis   • Pharyngitis   • Postmenopausal osteoporosis   • Trigger finger of both hands   • Viral upper respiratory infection        Past Medical History:    Diagnosis Date   • ASHD (arteriosclerotic heart disease)    • B12 deficiency    • Rader's esophagus    • Benign essential hypertension    • CKD (chronic kidney disease), stage III (CMS/HCC)    • Diabetes mellitus without complication (CMS/HCC)    • GERD without esophagitis    • Hemorrhoids    • History of colonic polyps    • Mixed hyperlipidemia    • Myocardial infarction (CMS/HCC)     NONSTEMI   • Stress-induced cardiomyopathy 02/27/2015   • Vitamin D deficiency        Past Surgical History:   Procedure Laterality Date   • CARPAL TUNNEL RELEASE  1989   • CATARACT EXTRACTION     • CHOLECYSTECTOMY  2001   • CORONARY ANGIOPLASTY WITH STENT PLACEMENT  2010    PTCA, stents, LAD   • HEMORRHOIDECTOMY  1965   • HYSTERECTOMY  1968    AGE 33   • OOPHORECTOMY Bilateral     AGE 33       Family History   Problem Relation Age of Onset   • Breast cancer Neg Hx    • Ovarian cancer Neg Hx        Social History     Socioeconomic History   • Marital status:      Spouse name: Not on file   • Number of children: Not on file   • Years of education: Not on file   • Highest education level: Not on file   Tobacco Use   • Smoking status: Never Smoker   • Smokeless tobacco: Never Used   Substance and Sexual Activity   • Alcohol use: No     Frequency: Never   • Drug use: No   • Sexual activity: Defer       Allergies   Allergen Reactions   • Clarithromycin Unknown (See Comments)     Unknown  Biaxin   • Codeine Unknown (See Comments)     unknown   • Dobutamine Unknown (See Comments)     unknown   • Iodinated Diagnostic Agents Unknown (See Comments)     unknown   • Ioversol Swelling     Tongue swelling  Contrast Dye    • Pantoprazole Sodium Unknown (See Comments)     Unknown  Protonix    • Perflutren Lipid Microsphere Unknown (See Comments)     Unknown  Definity    • Sulfa Antibiotics Unknown (See Comments)     Unknown  Bactrim   • Trimethoprim Unknown (See Comments)     Unknown  Bactrim         Current Outpatient Medications:   •   ALPRAZolam (XANAX) 0.5 MG tablet, Take one tablet twice a day as needed., Disp: 40 tablet, Rfl: 2  •  aspirin 81 MG EC tablet, Take 81 mg by mouth Daily. Take one daily, Disp: , Rfl:   •  atenolol (TENORMIN) 50 MG tablet, Take 1 tablet by mouth Daily. Take one daily, Disp: 30 tablet, Rfl: 5  •  atorvastatin (LIPITOR) 10 MG tablet, Take 1 tablet by mouth 3 (Three) Times a Week. Take one tablet three times a week, Disp: 39 tablet, Rfl: 3  •  Cholecalciferol (VITAMIN D3) 2000 units tablet, Take  by mouth. Take one daily, Disp: , Rfl:   •  Cyanocobalamin 1000 MCG/ML kit, Inject  as directed. Take injection once a month, Disp: , Rfl:   •  FREESTYLE LITE test strip, 1 each by Other route Daily., Disp: , Rfl: 11  •  hydrOXYzine (ATARAX) 25 MG tablet, Take 1 tablet by mouth 3 (Three) Times a Day As Needed for Itching., Disp: 90 tablet, Rfl: 3  •  lisinopril (PRINIVIL,ZESTRIL) 2.5 MG tablet, Take 1 tablet by mouth Daily. Take one daily, Disp: 30 tablet, Rfl: 5  •  Loratadine (CLARITIN) 10 MG capsule, Take 1 capsule by mouth Daily., Disp: , Rfl:   •  metFORMIN (GLUCOPHAGE) 1000 MG tablet, Take 1 tablet by mouth 2 (Two) Times a Day With Meals. Take one twice daily, Disp: 60 tablet, Rfl: 5  •  nitroglycerin (NITROSTAT) 0.4 MG SL tablet, Place 1 tablet under the tongue As Needed for Chest Pain. Take no more than 3 doses in 15 minutes., Disp: 25 tablet, Rfl: 5  •  promethazine (PHENERGAN) 25 MG tablet, Take as needed for nausea and vomiting, Disp: , Rfl:   •  tiZANidine (ZANAFLEX) 4 MG tablet, Take 1 tablet by mouth Every Night., Disp: 30 tablet, Rfl: 5    Current Facility-Administered Medications:   •  cyanocobalamin injection 1,000 mcg, 1,000 mcg, Intramuscular, Q28 Days, Kishore Grande MD, 1,000 mcg at 07/10/19 1008    Review of Systems   Constitutional: Positive for fatigue. Negative for chills and fever.   HENT: Negative for congestion, ear pain and sinus pressure.    Respiratory: Negative for cough, chest tightness,  "shortness of breath and wheezing.    Cardiovascular: Negative for chest pain and palpitations.   Gastrointestinal: Positive for abdominal pain. Negative for blood in stool and constipation.   Skin: Negative for color change.   Allergic/Immunologic: Negative for environmental allergies.   Neurological: Negative for dizziness, speech difficulty and headache.   Psychiatric/Behavioral: Negative for decreased concentration. The patient is not nervous/anxious.         Vital Signs  Vitals:    07/25/19 1012   BP: 132/70   BP Location: Left arm   Patient Position: Sitting   Cuff Size: Adult   Pulse: 64   Weight: 68 kg (150 lb)   Height: 162.6 cm (64.02\")   PainSc:   6   PainLoc: Back  Comment: knee       Physical Exam   Constitutional: She is oriented to person, place, and time. She appears well-developed and well-nourished.   HENT:   Head: Normocephalic and atraumatic.   Right Ear: External ear normal.   Left Ear: External ear normal.   Nose: Nose normal.   Mouth/Throat: Oropharynx is clear and moist. No oropharyngeal exudate.   Eyes: Conjunctivae and EOM are normal. Pupils are equal, round, and reactive to light.   Neck: Normal range of motion. Neck supple. No JVD present. No thyromegaly present.   Cardiovascular: Normal rate, regular rhythm, normal heart sounds and intact distal pulses. Exam reveals no gallop and no friction rub.   No murmur heard.  Pulmonary/Chest: Effort normal and breath sounds normal. No respiratory distress. She has no wheezes. She has no rales. She exhibits no tenderness.   Abdominal: Soft. Bowel sounds are normal. She exhibits no distension and no mass. There is no tenderness. There is no rebound and no guarding. No hernia.   Musculoskeletal: Normal range of motion. She exhibits no edema or tenderness.   She has tenderness and slight swelling in the right second MCP joints.  She has mild swelling in both knees.   Lymphadenopathy:     She has no cervical adenopathy.   Neurological: She is alert and " oriented to person, place, and time. She displays normal reflexes. No cranial nerve deficit or sensory deficit. She exhibits normal muscle tone. Coordination normal.   Skin: Skin is warm and dry. No rash noted. No erythema.   Psychiatric: She has a normal mood and affect. Her behavior is normal. Judgment and thought content normal.   Nursing note and vitals reviewed.     Procedures    ACE III MINI             Assessment/Plan:    Tessa was seen today for hyperlipidemia, hypertension, diabetes and vitamin b 12 deficiency.    Diagnoses and all orders for this visit:    Diabetic nephropathy associated with type 2 diabetes mellitus (CMS/HCC)    Benign essential hypertension    Mixed hyperlipidemia    ASHD (arteriosclerotic heart disease)    B12 deficiency    CKD (chronic kidney disease), stage III (CMS/HCC)    Idiopathic osteoarthritis    Other orders  -     ALPRAZolam (XANAX) 0.5 MG tablet; Take one tablet twice a day as needed.  -     metFORMIN (GLUCOPHAGE) 1000 MG tablet; Take 1 tablet by mouth 2 (Two) Times a Day With Meals. Take one twice daily  -     lisinopril (PRINIVIL,ZESTRIL) 2.5 MG tablet; Take 1 tablet by mouth Daily. Take one daily  -     atenolol (TENORMIN) 50 MG tablet; Take 1 tablet by mouth Daily. Take one daily  -     tiZANidine (ZANAFLEX) 4 MG tablet; Take 1 tablet by mouth Every Night.  -     hydrOXYzine (ATARAX) 25 MG tablet; Take 1 tablet by mouth 3 (Three) Times a Day As Needed for Itching.  -     nitroglycerin (NITROSTAT) 0.4 MG SL tablet; Place 1 tablet under the tongue As Needed for Chest Pain. Take no more than 3 doses in 15 minutes.    Plan    A1c is well controlled at 7.1.  GFR is stable at 49.  She will continue Metformin and low-carb diet.    Blood pressure is well controlled on lisinopril and atenolol.    Lipid panel reveals HDL and LDL at goal, with high triglycerides.  She will continue taking atorvastatin 3 days a week along with low-fat diet.    Coronary artery disease is  asymptomatic on aspirin, statin, beta-blocker, and ACE inhibitor.    B12 level is normal, continue monthly injections.    Arthritis is now her most limiting problem.  She sees a hand doctor at  for injections of her hands.  She has seen orthopedics in the past for knee injections.  I offered to make a referral to Dr. Menezes or Dr. Coronado.  She would like to hold off at present.    Overall though, given her present functionality, I think she is doing extremely well.          Plan of care reviewed with patient at the conclusion of today's visit. Education was provided regarding diagnosis, management, and any prescribed or recommended OTC medications.Patient verbalizes understanding of and agreement with management plan.         Kishore Grande MD

## 2019-08-14 ENCOUNTER — CLINICAL SUPPORT (OUTPATIENT)
Dept: INTERNAL MEDICINE | Facility: CLINIC | Age: 84
End: 2019-08-14

## 2019-08-14 DIAGNOSIS — E53.8 B12 DEFICIENCY: ICD-10-CM

## 2019-08-14 PROCEDURE — 96372 THER/PROPH/DIAG INJ SC/IM: CPT | Performed by: INTERNAL MEDICINE

## 2019-08-14 RX ADMIN — CYANOCOBALAMIN 1000 MCG: 1000 INJECTION, SOLUTION INTRAMUSCULAR; SUBCUTANEOUS at 10:34

## 2019-09-11 ENCOUNTER — CLINICAL SUPPORT (OUTPATIENT)
Dept: INTERNAL MEDICINE | Facility: CLINIC | Age: 84
End: 2019-09-11

## 2019-09-11 DIAGNOSIS — E53.8 B12 DEFICIENCY: ICD-10-CM

## 2019-09-11 PROCEDURE — 96372 THER/PROPH/DIAG INJ SC/IM: CPT | Performed by: INTERNAL MEDICINE

## 2019-09-11 RX ADMIN — CYANOCOBALAMIN 1000 MCG: 1000 INJECTION, SOLUTION INTRAMUSCULAR; SUBCUTANEOUS at 09:56

## 2019-10-01 ENCOUNTER — FLU SHOT (OUTPATIENT)
Dept: INTERNAL MEDICINE | Facility: CLINIC | Age: 84
End: 2019-10-01

## 2019-10-01 DIAGNOSIS — Z23 IMMUNIZATION, PNEUMOCOCCUS AND INFLUENZA: ICD-10-CM

## 2019-10-01 PROCEDURE — G0008 ADMIN INFLUENZA VIRUS VAC: HCPCS | Performed by: INTERNAL MEDICINE

## 2019-10-01 PROCEDURE — 90653 IIV ADJUVANT VACCINE IM: CPT | Performed by: INTERNAL MEDICINE

## 2019-10-09 ENCOUNTER — CLINICAL SUPPORT (OUTPATIENT)
Dept: INTERNAL MEDICINE | Facility: CLINIC | Age: 84
End: 2019-10-09

## 2019-10-09 DIAGNOSIS — E53.8 B12 DEFICIENCY: ICD-10-CM

## 2019-10-09 PROCEDURE — 96372 THER/PROPH/DIAG INJ SC/IM: CPT | Performed by: INTERNAL MEDICINE

## 2019-10-09 RX ADMIN — CYANOCOBALAMIN 1000 MCG: 1000 INJECTION, SOLUTION INTRAMUSCULAR; SUBCUTANEOUS at 10:33

## 2019-11-06 ENCOUNTER — TELEPHONE (OUTPATIENT)
Dept: INTERNAL MEDICINE | Facility: CLINIC | Age: 84
End: 2019-11-06

## 2019-11-06 RX ORDER — ATENOLOL 50 MG/1
TABLET ORAL
Qty: 30 TABLET | Refills: 11 | OUTPATIENT
Start: 2019-11-06

## 2019-11-06 RX ORDER — ALPRAZOLAM 0.5 MG/1
TABLET ORAL
Qty: 40 TABLET | Refills: 2 | Status: SHIPPED | OUTPATIENT
Start: 2019-11-06 | End: 2020-02-21

## 2019-11-06 RX ORDER — LISINOPRIL 2.5 MG/1
TABLET ORAL
Qty: 30 TABLET | Refills: 11 | OUTPATIENT
Start: 2019-11-06

## 2019-11-06 NOTE — TELEPHONE ENCOUNTER
Kenyatta would like a call to discuss refused medications:   Atenolol 50 MG TAKE 1 TABLET BY MOUTH ONCE DAILY       metFORMIN HCl 1000 MG TAKE 1 TABLET BY MOUTH TWICE DAILY WITH  MORNING  AND  EVENING  MEALS       Lisinopril 2.5 MG TAKE 1 TABLET BY MOUTH ONCE       Kenyatta would like a call at [phone] as she was out since 11/4/2019.

## 2019-11-13 ENCOUNTER — CLINICAL SUPPORT (OUTPATIENT)
Dept: INTERNAL MEDICINE | Facility: CLINIC | Age: 84
End: 2019-11-13

## 2019-11-13 DIAGNOSIS — E53.8 B12 DEFICIENCY: ICD-10-CM

## 2019-11-13 PROCEDURE — 96372 THER/PROPH/DIAG INJ SC/IM: CPT | Performed by: INTERNAL MEDICINE

## 2019-11-13 RX ADMIN — CYANOCOBALAMIN 1000 MCG: 1000 INJECTION, SOLUTION INTRAMUSCULAR; SUBCUTANEOUS at 11:33

## 2019-12-11 ENCOUNTER — CLINICAL SUPPORT (OUTPATIENT)
Dept: INTERNAL MEDICINE | Facility: CLINIC | Age: 84
End: 2019-12-11

## 2019-12-11 DIAGNOSIS — E53.8 B12 DEFICIENCY: ICD-10-CM

## 2019-12-11 PROCEDURE — 96372 THER/PROPH/DIAG INJ SC/IM: CPT | Performed by: INTERNAL MEDICINE

## 2019-12-11 RX ADMIN — CYANOCOBALAMIN 1000 MCG: 1000 INJECTION, SOLUTION INTRAMUSCULAR; SUBCUTANEOUS at 11:07

## 2020-01-09 ENCOUNTER — LAB (OUTPATIENT)
Dept: LAB | Facility: HOSPITAL | Age: 85
End: 2020-01-09

## 2020-01-09 ENCOUNTER — OFFICE VISIT (OUTPATIENT)
Dept: INTERNAL MEDICINE | Facility: CLINIC | Age: 85
End: 2020-01-09

## 2020-01-09 VITALS
BODY MASS INDEX: 25.61 KG/M2 | DIASTOLIC BLOOD PRESSURE: 84 MMHG | SYSTOLIC BLOOD PRESSURE: 148 MMHG | HEART RATE: 76 BPM | HEIGHT: 64 IN | WEIGHT: 150 LBS | TEMPERATURE: 97.7 F

## 2020-01-09 DIAGNOSIS — I10 BENIGN ESSENTIAL HYPERTENSION: ICD-10-CM

## 2020-01-09 DIAGNOSIS — N18.30 CKD (CHRONIC KIDNEY DISEASE), STAGE III (HCC): ICD-10-CM

## 2020-01-09 DIAGNOSIS — E11.21 DIABETIC NEPHROPATHY ASSOCIATED WITH TYPE 2 DIABETES MELLITUS (HCC): ICD-10-CM

## 2020-01-09 DIAGNOSIS — E78.2 MIXED HYPERLIPIDEMIA: ICD-10-CM

## 2020-01-09 DIAGNOSIS — E55.9 VITAMIN D DEFICIENCY: ICD-10-CM

## 2020-01-09 DIAGNOSIS — E53.8 B12 DEFICIENCY: ICD-10-CM

## 2020-01-09 DIAGNOSIS — J06.9 VIRAL UPPER RESPIRATORY INFECTION: ICD-10-CM

## 2020-01-09 DIAGNOSIS — K21.9 GERD WITHOUT ESOPHAGITIS: ICD-10-CM

## 2020-01-09 DIAGNOSIS — E11.21 DIABETIC NEPHROPATHY ASSOCIATED WITH TYPE 2 DIABETES MELLITUS (HCC): Primary | ICD-10-CM

## 2020-01-09 DIAGNOSIS — I25.10 ASHD (ARTERIOSCLEROTIC HEART DISEASE): ICD-10-CM

## 2020-01-09 LAB
25(OH)D3 SERPL-MCNC: 35.6 NG/ML (ref 30–100)
ALBUMIN SERPL-MCNC: 4.4 G/DL (ref 3.5–5.2)
ALBUMIN/GLOB SERPL: 1.3 G/DL
ALP SERPL-CCNC: 67 U/L (ref 39–117)
ALT SERPL W P-5'-P-CCNC: 10 U/L (ref 1–33)
ANION GAP SERPL CALCULATED.3IONS-SCNC: 12.3 MMOL/L (ref 5–15)
AST SERPL-CCNC: 13 U/L (ref 1–32)
BILIRUB SERPL-MCNC: 0.2 MG/DL (ref 0.2–1.2)
BUN BLD-MCNC: 22 MG/DL (ref 8–23)
BUN/CREAT SERPL: 22.4 (ref 7–25)
CALCIUM SPEC-SCNC: 9.8 MG/DL (ref 8.6–10.5)
CHLORIDE SERPL-SCNC: 100 MMOL/L (ref 98–107)
CHOLEST SERPL-MCNC: 203 MG/DL (ref 0–200)
CO2 SERPL-SCNC: 24.7 MMOL/L (ref 22–29)
CREAT BLD-MCNC: 0.98 MG/DL (ref 0.57–1)
GFR SERPL CREATININE-BSD FRML MDRD: 54 ML/MIN/1.73
GLOBULIN UR ELPH-MCNC: 3.4 GM/DL
GLUCOSE BLD-MCNC: 118 MG/DL (ref 65–99)
HBA1C MFR BLD: 6.9 % (ref 4.8–5.6)
HDLC SERPL-MCNC: 40 MG/DL (ref 40–60)
LDLC SERPL CALC-MCNC: 99 MG/DL (ref 0–100)
LDLC/HDLC SERPL: 2.48 {RATIO}
POTASSIUM BLD-SCNC: 4.3 MMOL/L (ref 3.5–5.2)
PROT SERPL-MCNC: 7.8 G/DL (ref 6–8.5)
SODIUM BLD-SCNC: 137 MMOL/L (ref 136–145)
TRIGL SERPL-MCNC: 319 MG/DL (ref 0–150)
VLDLC SERPL-MCNC: 63.8 MG/DL (ref 5–40)

## 2020-01-09 PROCEDURE — 83036 HEMOGLOBIN GLYCOSYLATED A1C: CPT

## 2020-01-09 PROCEDURE — 99214 OFFICE O/P EST MOD 30 MIN: CPT | Performed by: INTERNAL MEDICINE

## 2020-01-09 PROCEDURE — 80053 COMPREHEN METABOLIC PANEL: CPT

## 2020-01-09 PROCEDURE — 80061 LIPID PANEL: CPT

## 2020-01-09 PROCEDURE — 96372 THER/PROPH/DIAG INJ SC/IM: CPT | Performed by: INTERNAL MEDICINE

## 2020-01-09 PROCEDURE — 82306 VITAMIN D 25 HYDROXY: CPT

## 2020-01-09 RX ORDER — PROMETHAZINE HYDROCHLORIDE 25 MG/1
25 TABLET ORAL EVERY 8 HOURS PRN
Qty: 30 TABLET | Refills: 1 | Status: SHIPPED | OUTPATIENT
Start: 2020-01-09 | End: 2020-10-13 | Stop reason: SDUPTHER

## 2020-01-09 RX ORDER — AZITHROMYCIN 250 MG/1
TABLET, FILM COATED ORAL
Qty: 6 TABLET | Refills: 0 | Status: SHIPPED | OUTPATIENT
Start: 2020-01-09 | End: 2020-07-15

## 2020-01-09 RX ADMIN — CYANOCOBALAMIN 1000 MCG: 1000 INJECTION, SOLUTION INTRAMUSCULAR; SUBCUTANEOUS at 09:16

## 2020-01-09 NOTE — PROGRESS NOTES
[facility]     Tessa Adler  1935   [id number]      Patient Care Team:  Kishore Grande MD as PCP - General  Kishore Grande MD as PCP - Family Medicine    Chief Complaint::   Chief Complaint   Patient presents with   • Hyperlipidemia   • Hypertension   • Diabetes        HPI  Mrs. Adler comes in for follow-up of her diabetes, hypertension, hyperlipidemia, coronary artery disease, chronic kidney disease, B12 and vitamin D deficiency.  She states her glucoses have been high, up to 150.  She admits that she is had more bread and pasta recently.  Her main complaint though is worsening nasal sinus congestion, right ear pain for the past 2 weeks and a cough now productive of yellow sputum.  Her  was treated for bronchitis last week.  There are no fevers or chills.  She takes tylenol and alprazolam at night, this reduces hips pain and helps her sleep.  She has occasional episodes of nausea in the morning for which she takes promethazine.  She has no chest pain or dyspnea, although she did have one episode recently of indigestion which awakened her from sleep.  It was relieved by few sips of an Annetta 8 and belching.    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 (CMS/[facility])   • Hyperlipidemia due to type 2 diabetes mellitus (CMS/[facility])   • Idiopathic osteoarthritis   • Insomnia   • Irritable bowel syndrome   • Medicare annual wellness visit, subsequent   • Osteoarthritis   • Postmenopausal osteoporosis   • Trigger finger of both hands   • Viral upper respiratory  infection        Past Medical History:   Diagnosis Date   • ASHD (arteriosclerotic heart disease)    • B12 deficiency    • Rader's esophagus    • Benign essential hypertension    • CKD (chronic kidney disease), stage III (CMS/HCC)    • Diabetes mellitus without complication (CMS/HCC)    • GERD without esophagitis    • Hemorrhoids    • History of colonic polyps    • Mixed hyperlipidemia    • Myocardial infarction (CMS/HCC)     NONSTEMI   • Stress-induced cardiomyopathy 02/27/2015   • Vitamin D deficiency        Past Surgical History:   Procedure Laterality Date   • CARPAL TUNNEL RELEASE  1989   • CATARACT EXTRACTION     • CHOLECYSTECTOMY  2001   • CORONARY ANGIOPLASTY WITH STENT PLACEMENT  2010    PTCA, stents, LAD   • HEMORRHOIDECTOMY  1965   • HYSTERECTOMY  1968    AGE 33   • OOPHORECTOMY Bilateral     AGE 33       Family History   Problem Relation Age of Onset   • Breast cancer Neg Hx    • Ovarian cancer Neg Hx        Social History     Socioeconomic History   • Marital status:      Spouse name: Not on file   • Number of children: Not on file   • Years of education: Not on file   • Highest education level: Not on file   Tobacco Use   • Smoking status: Never Smoker   • Smokeless tobacco: Never Used   Substance and Sexual Activity   • Alcohol use: No     Frequency: Never   • Drug use: No   • Sexual activity: Defer       Allergies   Allergen Reactions   • Clarithromycin Unknown (See Comments)     Unknown  Biaxin   • Codeine Unknown (See Comments)     unknown   • Dobutamine Unknown (See Comments)     unknown   • Iodinated Diagnostic Agents Unknown (See Comments)     unknown   • Ioversol Swelling     Tongue swelling  Contrast Dye    • Pantoprazole Sodium Unknown (See Comments)     Unknown  Protonix    • Perflutren Lipid Microsphere Unknown (See Comments)     Unknown  Definity    • Sulfa Antibiotics Unknown (See Comments)     Unknown  Bactrim   • Trimethoprim Unknown (See Comments)     Unknown  Bactrim          Current Outpatient Medications:   •  ALPRAZolam (XANAX) 0.5 MG tablet, TAKE 1 TABLET BY MOUTH TWICE DAILY AS NEEDED, Disp: 40 tablet, Rfl: 2  •  aspirin 81 MG EC tablet, Take 81 mg by mouth Daily. Take one daily, Disp: , Rfl:   •  atenolol (TENORMIN) 50 MG tablet, Take 1 tablet by mouth Daily. Take one daily, Disp: 30 tablet, Rfl: 5  •  atorvastatin (LIPITOR) 10 MG tablet, Take 1 tablet by mouth 3 (Three) Times a Week. Take one tablet three times a week, Disp: 39 tablet, Rfl: 3  •  Cholecalciferol (VITAMIN D3) 2000 units tablet, Take  by mouth. Take one daily, Disp: , Rfl:   •  Cyanocobalamin 1000 MCG/ML kit, Inject  as directed. Take injection once a month, Disp: , Rfl:   •  FREESTYLE LITE test strip, 1 each by Other route Daily., Disp: , Rfl: 11  •  hydrOXYzine (ATARAX) 25 MG tablet, Take 1 tablet by mouth 3 (Three) Times a Day As Needed for Itching., Disp: 90 tablet, Rfl: 3  •  lisinopril (PRINIVIL,ZESTRIL) 2.5 MG tablet, Take 1 tablet by mouth Daily. Take one daily, Disp: 30 tablet, Rfl: 5  •  Loratadine (CLARITIN) 10 MG capsule, Take 1 capsule by mouth Daily., Disp: , Rfl:   •  metFORMIN (GLUCOPHAGE) 1000 MG tablet, Take 1 tablet by mouth 2 (Two) Times a Day With Meals. Take one twice daily, Disp: 60 tablet, Rfl: 5  •  Multiple Vitamins-Minerals (ICAPS AREDS 2 PO), Take 1 capsule by mouth Daily., Disp: , Rfl:   •  nitroglycerin (NITROSTAT) 0.4 MG SL tablet, Place 1 tablet under the tongue As Needed for Chest Pain. Take no more than 3 doses in 15 minutes., Disp: 25 tablet, Rfl: 5  •  promethazine (PHENERGAN) 25 MG tablet, Take 1 tablet by mouth Every 8 (Eight) Hours As Needed for Nausea or Vomiting. Take as needed for nausea and vomiting, Disp: 30 tablet, Rfl: 1  •  tiZANidine (ZANAFLEX) 4 MG tablet, Take 1 tablet by mouth Every Night., Disp: 30 tablet, Rfl: 5  •  azithromycin (ZITHROMAX Z-GOMEZ) 250 MG tablet, Take 2 tablets the first day, then 1 tablet daily for 4 days., Disp: 6 tablet, Rfl: 0    Current  "Facility-Administered Medications:   •  cyanocobalamin injection 1,000 mcg, 1,000 mcg, Intramuscular, Q28 Days, Kishore Grande MD, 1,000 mcg at 12/11/19 1107    Review of Systems   Constitutional: Negative for chills, fatigue and fever.   HENT: Positive for sinus pressure and sore throat. Negative for congestion and ear pain.    Respiratory: Positive for cough. Negative for chest tightness, shortness of breath and wheezing.    Cardiovascular: Negative for chest pain and palpitations.   Gastrointestinal: Negative for abdominal pain, blood in stool and constipation.   Skin: Negative for color change.   Allergic/Immunologic: Negative for environmental allergies.   Neurological: Positive for headache. Negative for dizziness and speech difficulty.   Psychiatric/Behavioral: Negative for decreased concentration. The patient is not nervous/anxious.         Vital Signs  Vitals:    01/09/20 0838   BP: 148/84   BP Location: Left arm   Patient Position: Sitting   Cuff Size: Adult   Pulse: 76   Temp: 97.7 °F (36.5 °C)   Weight: 68 kg (150 lb)   Height: 162.6 cm (64.02\")   PainSc: 0-No pain       Physical Exam   Constitutional: She is oriented to person, place, and time. She appears well-developed and well-nourished.   HENT:   Head: Normocephalic and atraumatic.   Right Ear: Tympanic membrane is bulging.   Left Ear: Tympanic membrane is bulging.   Mouth/Throat: Oropharynx is clear and moist. No oropharyngeal exudate or posterior oropharyngeal erythema.   Cardiovascular: Normal rate, regular rhythm and normal heart sounds.   No murmur heard.  Pulmonary/Chest: Effort normal and breath sounds normal.   Neurological: She is alert and oriented to person, place, and time.   Psychiatric: She has a normal mood and affect.   Vitals reviewed.     Procedures    ACE III MINI             Assessment/Plan:    Tessa was seen today for hyperlipidemia, hypertension and diabetes.    Diagnoses and all orders for this visit:    Diabetic " nephropathy associated with type 2 diabetes mellitus (CMS/HCC)  -     Hemoglobin A1c; Future    Benign essential hypertension  -     Comprehensive Metabolic Panel; Future    Mixed hyperlipidemia  -     Lipid Panel; Future    ASHD (arteriosclerotic heart disease)    GERD without esophagitis    B12 deficiency  -     Cancel: Vitamin B12; Future    Vitamin D deficiency  -     Vitamin D 25 Hydroxy; Future    CKD (chronic kidney disease), stage III (CMS/HCC)    Viral upper respiratory infection    Other orders  -     promethazine (PHENERGAN) 25 MG tablet; Take 1 tablet by mouth Every 8 (Eight) Hours As Needed for Nausea or Vomiting. Take as needed for nausea and vomiting  -     azithromycin (ZITHROMAX Z-GOMEZ) 250 MG tablet; Take 2 tablets the first day, then 1 tablet daily for 4 days.    Plan    A1c is pending.  She will continue metformin, but if her A1c is greater than 7.5 would resume glimepiride.  We discussed the importance of reducing simple carbohydrates.    Blood pressure is slightly elevated today but is been consistently normal at home.  She will continue lisinopril and atenolol.    Lipid panel is pending she will continue atorvastatin and work on healthier diet.    Coronary artery disease is currently asymptomatic on aspirin, atenolol, atorvastatin and lisinopril.  Recent episode of indigestion does not sound like a cardiac symptom.    GERD is stable    She will continue monthly B12 injections and her oral vitamin D supplementation.    GFR is pending, treatment remains control of blood pressure and blood glucose and avoidance of NSAIDs.    URI is probably viral but given her comorbidities she is given a azithromycin and will use nasal steroids twice a day.      Plan of care reviewed with patient at the conclusion of today's visit. Education was provided regarding diagnosis, management, and any prescribed or recommended OTC medications.Patient verbalizes understanding of and agreement with management plan.          Kishore Grande MD

## 2020-02-10 ENCOUNTER — CLINICAL SUPPORT (OUTPATIENT)
Dept: INTERNAL MEDICINE | Facility: CLINIC | Age: 85
End: 2020-02-10

## 2020-02-10 DIAGNOSIS — E53.8 B12 DEFICIENCY: ICD-10-CM

## 2020-02-10 PROCEDURE — 96372 THER/PROPH/DIAG INJ SC/IM: CPT | Performed by: INTERNAL MEDICINE

## 2020-02-10 RX ADMIN — CYANOCOBALAMIN 1000 MCG: 1000 INJECTION, SOLUTION INTRAMUSCULAR; SUBCUTANEOUS at 13:23

## 2020-02-21 DIAGNOSIS — F41.9 ANXIETY: Primary | ICD-10-CM

## 2020-02-21 RX ORDER — ALPRAZOLAM 0.5 MG/1
TABLET ORAL
Qty: 40 TABLET | Refills: 2 | Status: CANCELLED | OUTPATIENT
Start: 2020-02-21

## 2020-02-21 RX ORDER — ALPRAZOLAM 0.5 MG/1
TABLET ORAL
Qty: 40 TABLET | Refills: 0 | Status: SHIPPED | OUTPATIENT
Start: 2020-02-21 | End: 2020-03-24 | Stop reason: SDUPTHER

## 2020-02-21 NOTE — TELEPHONE ENCOUNTER
Pt is requesting a refil of Xanax and have it sent to [facility] on [address].  Pt says she only has one tablet left for tonight.

## 2020-03-11 ENCOUNTER — CLINICAL SUPPORT (OUTPATIENT)
Dept: INTERNAL MEDICINE | Facility: CLINIC | Age: 85
End: 2020-03-11

## 2020-03-11 DIAGNOSIS — E53.8 B12 DEFICIENCY: ICD-10-CM

## 2020-03-11 PROCEDURE — 96372 THER/PROPH/DIAG INJ SC/IM: CPT | Performed by: INTERNAL MEDICINE

## 2020-03-11 RX ADMIN — CYANOCOBALAMIN 1000 MCG: 1000 INJECTION, SOLUTION INTRAMUSCULAR; SUBCUTANEOUS at 11:56

## 2020-03-24 ENCOUNTER — TELEPHONE (OUTPATIENT)
Dept: INTERNAL MEDICINE | Facility: CLINIC | Age: 85
End: 2020-03-24

## 2020-03-24 DIAGNOSIS — F41.9 ANXIETY: ICD-10-CM

## 2020-03-24 RX ORDER — ALPRAZOLAM 0.5 MG/1
0.5 TABLET ORAL 2 TIMES DAILY PRN
Qty: 40 TABLET | Refills: 0 | Status: SHIPPED | OUTPATIENT
Start: 2020-03-24 | End: 2020-04-27 | Stop reason: SDUPTHER

## 2020-03-24 NOTE — TELEPHONE ENCOUNTER
PT CALLED TO REQUEST A REFILL FOR   ALPRAZolam (XANAX) 0.5 MG tablet.    PT CONTACT [phone]     PHARMACY VERIFIED

## 2020-04-08 ENCOUNTER — CLINICAL SUPPORT (OUTPATIENT)
Dept: INTERNAL MEDICINE | Facility: CLINIC | Age: 85
End: 2020-04-08

## 2020-04-08 DIAGNOSIS — E53.8 B12 DEFICIENCY: ICD-10-CM

## 2020-04-08 PROCEDURE — 96372 THER/PROPH/DIAG INJ SC/IM: CPT | Performed by: INTERNAL MEDICINE

## 2020-04-08 RX ADMIN — CYANOCOBALAMIN 1000 MCG: 1000 INJECTION, SOLUTION INTRAMUSCULAR; SUBCUTANEOUS at 10:53

## 2020-04-10 RX ORDER — BLOOD-GLUCOSE METER
KIT MISCELLANEOUS
Qty: 100 EACH | Refills: 0 | Status: SHIPPED | OUTPATIENT
Start: 2020-04-10 | End: 2020-04-13 | Stop reason: SDUPTHER

## 2020-04-10 RX ORDER — LANCETS 28 GAUGE
EACH MISCELLANEOUS
Qty: 100 EACH | Refills: 0 | Status: SHIPPED | OUTPATIENT
Start: 2020-04-10 | End: 2020-04-13 | Stop reason: SDUPTHER

## 2020-04-13 ENCOUNTER — TELEPHONE (OUTPATIENT)
Dept: INTERNAL MEDICINE | Facility: CLINIC | Age: 85
End: 2020-04-13

## 2020-04-13 RX ORDER — LANCETS 28 GAUGE
EACH MISCELLANEOUS
Qty: 100 EACH | Refills: 3 | Status: SHIPPED | OUTPATIENT
Start: 2020-04-13 | End: 2021-05-18 | Stop reason: SDUPTHER

## 2020-04-13 NOTE — TELEPHONE ENCOUNTER
Patient stated that pharmacy advised her that they cannot refill the Freestyle Lancets or test strips b/c they need a certain code.     [facility] [address] [place] [address] [address] [place] [address] - [phone]  - [phone] FX     Patient stated she is out of strips and lancets.

## 2020-04-27 DIAGNOSIS — F41.9 ANXIETY: ICD-10-CM

## 2020-04-27 RX ORDER — ALPRAZOLAM 0.5 MG/1
0.5 TABLET ORAL 2 TIMES DAILY PRN
Qty: 40 TABLET | Refills: 2 | Status: SHIPPED | OUTPATIENT
Start: 2020-04-27 | End: 2020-08-14 | Stop reason: SDUPTHER

## 2020-05-12 RX ORDER — ATENOLOL 50 MG/1
TABLET ORAL
Qty: 30 TABLET | Refills: 3 | Status: SHIPPED | OUTPATIENT
Start: 2020-05-12 | End: 2020-09-03

## 2020-05-12 RX ORDER — LISINOPRIL 2.5 MG/1
TABLET ORAL
Qty: 30 TABLET | Refills: 3 | Status: SHIPPED | OUTPATIENT
Start: 2020-05-12 | End: 2020-09-03

## 2020-05-13 ENCOUNTER — CLINICAL SUPPORT (OUTPATIENT)
Dept: INTERNAL MEDICINE | Facility: CLINIC | Age: 85
End: 2020-05-13

## 2020-05-13 DIAGNOSIS — E53.8 B12 DEFICIENCY: ICD-10-CM

## 2020-05-13 PROCEDURE — 96372 THER/PROPH/DIAG INJ SC/IM: CPT | Performed by: INTERNAL MEDICINE

## 2020-05-13 RX ADMIN — CYANOCOBALAMIN 1000 MCG: 1000 INJECTION, SOLUTION INTRAMUSCULAR; SUBCUTANEOUS at 13:17

## 2020-06-17 ENCOUNTER — CLINICAL SUPPORT (OUTPATIENT)
Dept: INTERNAL MEDICINE | Facility: CLINIC | Age: 85
End: 2020-06-17

## 2020-06-17 DIAGNOSIS — E53.8 B12 DEFICIENCY: ICD-10-CM

## 2020-06-17 PROCEDURE — 96372 THER/PROPH/DIAG INJ SC/IM: CPT | Performed by: INTERNAL MEDICINE

## 2020-06-17 RX ADMIN — CYANOCOBALAMIN 1000 MCG: 1000 INJECTION, SOLUTION INTRAMUSCULAR; SUBCUTANEOUS at 12:53

## 2020-07-15 ENCOUNTER — LAB (OUTPATIENT)
Dept: LAB | Facility: HOSPITAL | Age: 85
End: 2020-07-15

## 2020-07-15 ENCOUNTER — OFFICE VISIT (OUTPATIENT)
Dept: INTERNAL MEDICINE | Facility: CLINIC | Age: 85
End: 2020-07-15

## 2020-07-15 VITALS
WEIGHT: 153.6 LBS | HEIGHT: 64 IN | HEART RATE: 68 BPM | SYSTOLIC BLOOD PRESSURE: 154 MMHG | BODY MASS INDEX: 26.22 KG/M2 | DIASTOLIC BLOOD PRESSURE: 85 MMHG | TEMPERATURE: 97.5 F

## 2020-07-15 DIAGNOSIS — I10 BENIGN ESSENTIAL HYPERTENSION: ICD-10-CM

## 2020-07-15 DIAGNOSIS — E53.8 B12 DEFICIENCY: ICD-10-CM

## 2020-07-15 DIAGNOSIS — Z79.899 LONG-TERM USE OF HIGH-RISK MEDICATION: ICD-10-CM

## 2020-07-15 DIAGNOSIS — E55.9 VITAMIN D DEFICIENCY: ICD-10-CM

## 2020-07-15 DIAGNOSIS — Z12.31 ENCOUNTER FOR SCREENING MAMMOGRAM FOR MALIGNANT NEOPLASM OF BREAST: ICD-10-CM

## 2020-07-15 DIAGNOSIS — E11.21 DIABETIC NEPHROPATHY ASSOCIATED WITH TYPE 2 DIABETES MELLITUS (HCC): ICD-10-CM

## 2020-07-15 DIAGNOSIS — B37.9 CANDIDA ALBICANS INFECTION: ICD-10-CM

## 2020-07-15 DIAGNOSIS — Z78.0 POSTMENOPAUSAL: ICD-10-CM

## 2020-07-15 DIAGNOSIS — E78.2 MIXED HYPERLIPIDEMIA: ICD-10-CM

## 2020-07-15 DIAGNOSIS — Z12.39 BREAST CANCER SCREENING: ICD-10-CM

## 2020-07-15 DIAGNOSIS — Z00.00 MEDICARE ANNUAL WELLNESS VISIT, SUBSEQUENT: Primary | ICD-10-CM

## 2020-07-15 LAB
ALBUMIN SERPL-MCNC: 4.3 G/DL (ref 3.5–5.2)
ALBUMIN UR-MCNC: <1.2 MG/DL
ALBUMIN/GLOB SERPL: 1.3 G/DL
ALP SERPL-CCNC: 59 U/L (ref 39–117)
ALT SERPL W P-5'-P-CCNC: 12 U/L (ref 1–33)
AMPHET+METHAMPHET UR QL: NEGATIVE
AMPHETAMINES UR QL: NEGATIVE
ANION GAP SERPL CALCULATED.3IONS-SCNC: 11.3 MMOL/L (ref 5–15)
AST SERPL-CCNC: 14 U/L (ref 1–32)
BARBITURATES UR QL SCN: NEGATIVE
BASOPHILS # BLD AUTO: 0.06 10*3/MM3 (ref 0–0.2)
BASOPHILS NFR BLD AUTO: 0.7 % (ref 0–1.5)
BENZODIAZ UR QL SCN: POSITIVE
BILIRUB SERPL-MCNC: 0.3 MG/DL (ref 0–1.2)
BUN SERPL-MCNC: 15 MG/DL (ref 8–23)
BUN/CREAT SERPL: 16.5 (ref 7–25)
BUPRENORPHINE SERPL-MCNC: NEGATIVE NG/ML
CALCIUM SPEC-SCNC: 9.9 MG/DL (ref 8.6–10.5)
CANNABINOIDS SERPL QL: NEGATIVE
CHLORIDE SERPL-SCNC: 103 MMOL/L (ref 98–107)
CHOLEST SERPL-MCNC: 203 MG/DL (ref 0–200)
CO2 SERPL-SCNC: 24.7 MMOL/L (ref 22–29)
COCAINE UR QL: NEGATIVE
CREAT SERPL-MCNC: 0.91 MG/DL (ref 0.57–1)
CREAT UR-MCNC: 44.5 MG/DL
DEPRECATED RDW RBC AUTO: 44.4 FL (ref 37–54)
EOSINOPHIL # BLD AUTO: 0.38 10*3/MM3 (ref 0–0.4)
EOSINOPHIL NFR BLD AUTO: 4.4 % (ref 0.3–6.2)
ERYTHROCYTE [DISTWIDTH] IN BLOOD BY AUTOMATED COUNT: 13.8 % (ref 12.3–15.4)
GFR SERPL CREATININE-BSD FRML MDRD: 59 ML/MIN/1.73
GLOBULIN UR ELPH-MCNC: 3.3 GM/DL
GLUCOSE SERPL-MCNC: 111 MG/DL (ref 65–99)
HBA1C MFR BLD: 6.8 % (ref 4.8–5.6)
HCT VFR BLD AUTO: 35.5 % (ref 34–46.6)
HDLC SERPL-MCNC: 42 MG/DL (ref 40–60)
HGB BLD-MCNC: 11.8 G/DL (ref 12–15.9)
IMM GRANULOCYTES # BLD AUTO: 0.05 10*3/MM3 (ref 0–0.05)
IMM GRANULOCYTES NFR BLD AUTO: 0.6 % (ref 0–0.5)
LDLC SERPL CALC-MCNC: ABNORMAL MG/DL
LDLC/HDLC SERPL: ABNORMAL {RATIO}
LYMPHOCYTES # BLD AUTO: 2.73 10*3/MM3 (ref 0.7–3.1)
LYMPHOCYTES NFR BLD AUTO: 31.7 % (ref 19.6–45.3)
MCH RBC QN AUTO: 28.9 PG (ref 26.6–33)
MCHC RBC AUTO-ENTMCNC: 33.2 G/DL (ref 31.5–35.7)
MCV RBC AUTO: 87 FL (ref 79–97)
METHADONE UR QL SCN: NEGATIVE
MICROALBUMIN/CREAT UR: NORMAL MG/G{CREAT}
MONOCYTES # BLD AUTO: 0.54 10*3/MM3 (ref 0.1–0.9)
MONOCYTES NFR BLD AUTO: 6.3 % (ref 5–12)
NEUTROPHILS NFR BLD AUTO: 4.86 10*3/MM3 (ref 1.7–7)
NEUTROPHILS NFR BLD AUTO: 56.3 % (ref 42.7–76)
NRBC BLD AUTO-RTO: 0 /100 WBC (ref 0–0.2)
OPIATES UR QL: NEGATIVE
OXYCODONE UR QL SCN: NEGATIVE
PCP UR QL SCN: NEGATIVE
PLATELET # BLD AUTO: 305 10*3/MM3 (ref 140–450)
PMV BLD AUTO: 10.3 FL (ref 6–12)
POTASSIUM SERPL-SCNC: 4.4 MMOL/L (ref 3.5–5.2)
PROPOXYPH UR QL: NEGATIVE
PROT SERPL-MCNC: 7.6 G/DL (ref 6–8.5)
RBC # BLD AUTO: 4.08 10*6/MM3 (ref 3.77–5.28)
SODIUM SERPL-SCNC: 139 MMOL/L (ref 136–145)
TRICYCLICS UR QL SCN: NEGATIVE
TRIGL SERPL-MCNC: 431 MG/DL (ref 0–150)
VLDLC SERPL-MCNC: ABNORMAL MG/DL
WBC # BLD AUTO: 8.62 10*3/MM3 (ref 3.4–10.8)

## 2020-07-15 PROCEDURE — 84443 ASSAY THYROID STIM HORMONE: CPT

## 2020-07-15 PROCEDURE — 82570 ASSAY OF URINE CREATININE: CPT

## 2020-07-15 PROCEDURE — 99213 OFFICE O/P EST LOW 20 MIN: CPT | Performed by: NURSE PRACTITIONER

## 2020-07-15 PROCEDURE — 96372 THER/PROPH/DIAG INJ SC/IM: CPT | Performed by: NURSE PRACTITIONER

## 2020-07-15 PROCEDURE — 80061 LIPID PANEL: CPT

## 2020-07-15 PROCEDURE — G0439 PPPS, SUBSEQ VISIT: HCPCS | Performed by: NURSE PRACTITIONER

## 2020-07-15 PROCEDURE — 80053 COMPREHEN METABOLIC PANEL: CPT

## 2020-07-15 PROCEDURE — 85025 COMPLETE CBC W/AUTO DIFF WBC: CPT

## 2020-07-15 PROCEDURE — 83721 ASSAY OF BLOOD LIPOPROTEIN: CPT

## 2020-07-15 PROCEDURE — 83036 HEMOGLOBIN GLYCOSYLATED A1C: CPT

## 2020-07-15 PROCEDURE — 82607 VITAMIN B-12: CPT

## 2020-07-15 PROCEDURE — 82306 VITAMIN D 25 HYDROXY: CPT

## 2020-07-15 PROCEDURE — 80306 DRUG TEST PRSMV INSTRMNT: CPT

## 2020-07-15 PROCEDURE — 82043 UR ALBUMIN QUANTITATIVE: CPT

## 2020-07-15 RX ORDER — NYSTATIN 100000 U/G
OINTMENT TOPICAL 2 TIMES DAILY
Qty: 30 G | Refills: 1 | Status: SHIPPED | OUTPATIENT
Start: 2020-07-15 | End: 2020-09-18

## 2020-07-15 RX ADMIN — CYANOCOBALAMIN 1000 MCG: 1000 INJECTION, SOLUTION INTRAMUSCULAR; SUBCUTANEOUS at 11:51

## 2020-07-15 NOTE — PROGRESS NOTES
QUICK REFERENCE INFORMATION:  The ABCs of the Annual Wellness Visit    Subsequent Medicare Wellness Visit    HEALTH RISK ASSESSMENT    1935    Recent Hospitalizations:  No hospitalization(s) within the last year..        Current Medical Providers:  Patient Care Team:  Kishore Grande MD as PCP - General  Kishore Grande MD as PCP - Family Medicine  Kishore Grande MD as PCP - Claims Attributed        Smoking Status:  Social History     Tobacco Use   Smoking Status Never Smoker   Smokeless Tobacco Never Used       Alcohol Consumption:  Social History     Substance and Sexual Activity   Alcohol Use No   • Frequency: Never       Depression Screen:   PHQ-2/PHQ-9 Depression Screening 7/15/2020   Little interest or pleasure in doing things 0   Feeling down, depressed, or hopeless 0   Total Score 0       Health Habits and Functional and Cognitive Screening:  Functional & Cognitive Status 7/15/2020   Do you have difficulty preparing food and eating? No   Do you have difficulty bathing yourself, getting dressed or grooming yourself? No   Do you have difficulty using the toilet? No   Do you have difficulty moving around from place to place? Yes   Do you have trouble with steps or getting out of a bed or a chair? -   Current Diet Unhealthy Diet   Dental Exam Up to date   Eye Exam Up to date   Exercise (times per week) 3 times per week   Current Exercise Activities Include Walking   Do you need help using the phone?  No   Are you deaf or do you have serious difficulty hearing?  No   Do you need help with transportation? No   Do you need help shopping? No   Do you need help preparing meals?  No   Do you need help with housework?  No   Do you need help with laundry? No   Do you need help taking your medications? No   Do you need help managing money? No   Do you ever drive or ride in a car without wearing a seat belt? No   Have you felt unusual stress, anger or loneliness in the last month? No   Who do  you live with? Spouse   If you need help, do you have trouble finding someone available to you? No   Have you been bothered in the last four weeks by sexual problems? No   Do you have difficulty concentrating, remembering or making decisions? No       Fall Risk Screen:  LOREE Fall Risk Assessment was completed, and patient is at LOW risk for falls.Assessment completed on:7/15/2020    ACE III MINI   ATTENTION  What is the year: correct  What is the month of the year: correct  What is the day of the week?: correct  What is the date?: correct  MEMORY  Repeat address three times, only score third attempt: [address] [place], [place]: 7  HOW MANY ANIMALS DID THE PATIENT NAME  Verbal Fluency -- Animal Names (0-25): 22+  CLOCK DRAWING  Clock Drawing: All Correct  MEMORY RECALL  Tell me what you remember about that name and address we were repeating at the beginnin  ACE TOTAL SCORE  Total ACE Score - <25/30 strongly suggests cognitive impairment; <21/30 almost certainly shows dementia: 29    Does the patient have evidence of cognitive impairment? No    Aspirin use counseling: Taking ASA appropriately as indicated    Recent Lab Results:  CMP:  Lab Results   Component Value Date    BUN 22 2020    CREATININE 0.98 2020    EGFRIFNONA 54 (L) 2020    BCR 22.4 2020     2020    K 4.3 2020    CO2 24.7 2020    CALCIUM 9.8 2020    ALBUMIN 4.40 2020    BILITOT 0.2 2020    ALKPHOS 67 2020    AST 13 2020    ALT 10 2020     HbA1c:  Lab Results   Component Value Date    HGBA1C 6.90 (H) 2020    HGBA1C 7.11 (H) 2019     Microalbumin:  Lab Results   Component Value Date    MICROALBUR <1.2 2019     Lipid Panel  Lab Results   Component Value Date    CHOL 203 (H) 2020    TRIG 319 (H) 2020    HDL 40 2020    LDL 99 2020    AST 13 2020    ALT 10 2020       Visual Acuity:  No exam data  present    Age-appropriate Screening Schedule:  Refer to the list below for future screening recommendations based on patient's age, sex and/or medical conditions. Orders for these recommended tests are listed in the plan section. The patient has been provided with a written plan.    Health Maintenance   Topic Date Due   • ZOSTER VACCINE (1 of 2) 07/28/1987   • DXA SCAN  07/01/2019   • DIABETIC EYE EXAM  10/30/2019   • URINE MICROALBUMIN  07/05/2020   • HEMOGLOBIN A1C  07/09/2020   • INFLUENZA VACCINE  08/01/2020   • LIPID PANEL  01/09/2021   • TDAP/TD VACCINES (2 - Td) 11/08/2027        Subjective   History of Present Illness  Has rash low abdomen, odor.  Recurrent problem      Tessa Adler is a 84 y.o. female who presents for a Subsequent Wellness Visit.    CHRONIC CONDITIONS    The following portions of the patient's history were reviewed and updated as appropriate: allergies, current medications, past family history, past medical history, past social history, past surgical history and problem list.    Outpatient Medications Prior to Visit   Medication Sig Dispense Refill   • ALPRAZolam (XANAX) 0.5 MG tablet Take 1 tablet by mouth 2 (Two) Times a Day As Needed for Anxiety. 40 tablet 2   • aspirin 81 MG EC tablet Take 81 mg by mouth Daily. Take one daily     • atenolol (TENORMIN) 50 MG tablet Take 1 tablet by mouth once daily 30 tablet 3   • atorvastatin (LIPITOR) 10 MG tablet Take 1 tablet by mouth 3 (Three) Times a Week. Take one tablet three times a week 39 tablet 3   • Cholecalciferol (VITAMIN D3) 2000 units tablet Take  by mouth. Take one daily     • Cyanocobalamin 1000 MCG/ML kit Inject  as directed. Take injection once a month     • glucose blood (FREESTYLE LITE) test strip 1 each by Other route Daily. Use as instructed 100 each 3   • hydrOXYzine (ATARAX) 25 MG tablet Take 1 tablet by mouth 3 (Three) Times a Day As Needed for Itching. 90 tablet 3   • Lancets (FREESTYLE) lancets Use once daily as  instructed for blood sugar testing 100 each 3   • lisinopril (PRINIVIL,ZESTRIL) 2.5 MG tablet Take 1 tablet by mouth once daily 30 tablet 3   • Loratadine (CLARITIN) 10 MG capsule Take 1 capsule by mouth Daily. PRN     • metFORMIN (GLUCOPHAGE) 1000 MG tablet TAKE 1 TABLET BY MOUTH TWICE DAILY WITH MEALS 60 tablet 5   • Multiple Vitamins-Minerals (ICAPS AREDS 2 PO) Take 1 capsule by mouth Daily.     • nitroglycerin (NITROSTAT) 0.4 MG SL tablet Place 1 tablet under the tongue As Needed for Chest Pain. Take no more than 3 doses in 15 minutes. 25 tablet 5   • promethazine (PHENERGAN) 25 MG tablet Take 1 tablet by mouth Every 8 (Eight) Hours As Needed for Nausea or Vomiting. Take as needed for nausea and vomiting 30 tablet 1   • tiZANidine (ZANAFLEX) 4 MG tablet Take 1 tablet by mouth Every Night. 30 tablet 5   • azithromycin (ZITHROMAX Z-GOMEZ) 250 MG tablet Take 2 tablets the first day, then 1 tablet daily for 4 days. 6 tablet 0     Facility-Administered Medications Prior to Visit   Medication Dose Route Frequency Provider Last Rate Last Dose   • cyanocobalamin injection 1,000 mcg  1,000 mcg Intramuscular Q28 Days Kishore Grande MD   1,000 mcg at 06/17/20 1253       Patient Active Problem List   Diagnosis   • B12 deficiency   • Mixed hyperlipidemia   • Diabetes mellitus without complication (CMS/HCC)   • GERD without esophagitis   • Benign essential hypertension   • ASHD (arteriosclerotic heart disease)   • Vitamin D deficiency   • CKD (chronic kidney disease), stage III (CMS/HCC)   • Annual physical exam   • BMI 25.0-25.9,adult   • Diabetic nephropathy associated with type 2 diabetes mellitus (CMS/HCC)   • Hyperlipidemia due to type 2 diabetes mellitus (CMS/HCC)   • Idiopathic osteoarthritis   • Insomnia   • Irritable bowel syndrome   • Medicare annual wellness visit, subsequent   • Osteoarthritis   • Postmenopausal osteoporosis   • Trigger finger of both hands   • Viral upper respiratory infection       Advance  "Care Planning:  ACP discussion was held with the patient during this visit. Patient does not have an advance directive, declines further assistance.    Identification of Risk Factors:  Risk factors include: Immunizations Discussed/Encouraged (specific immunizations; Shingrix ).    Review of Systems   Constitutional: Negative for chills, fatigue and fever.   HENT: Negative for congestion, ear pain and sinus pressure.    Respiratory: Negative for cough, chest tightness, shortness of breath and wheezing.    Cardiovascular: Negative for chest pain and palpitations.   Gastrointestinal: Negative for abdominal pain, blood in stool and constipation.   Skin: Negative for color change.   Allergic/Immunologic: Negative for environmental allergies.   Neurological: Negative for dizziness, speech difficulty and headaches.   Psychiatric/Behavioral: Negative for confusion. The patient is nervous/anxious.        Compared to one year ago, the patient feels her physical health is the same.  Compared to one year ago, the patient feels her mental health is the same.    Objective     Physical Exam   Constitutional: She appears well-developed and well-nourished.   Pulmonary/Chest: Effort normal.   Neurological: She is alert.   Skin: Skin is warm and dry.   Red beefy rash right groin and below pannus   Psychiatric: She has a normal mood and affect. Her behavior is normal. Judgment and thought content normal. Cognition and memory are normal.   Vitals reviewed.       Procedures     Vitals:    07/15/20 0922   BP: 154/85   BP Location: Left arm   Patient Position: Sitting   Cuff Size: Adult   Pulse: 68   Temp: 97.5 °F (36.4 °C)   TempSrc: Temporal   Weight: 69.7 kg (153 lb 9.6 oz)   Height: 162.6 cm (64.02\")   PainSc: 0-No pain       Patient's Body mass index is 26.35 kg/m². BMI is within normal parameters. No follow-up required..      Assessment/Plan   Problem List Items Addressed This Visit        Cardiovascular and Mediastinum    Mixed " hyperlipidemia    Relevant Medications    atorvastatin (LIPITOR) 10 MG tablet    Other Relevant Orders    Lipid Panel    TSH Rfx On Abnormal To Free T4    Benign essential hypertension    Relevant Medications    atenolol (TENORMIN) 50 MG tablet    lisinopril (PRINIVIL,ZESTRIL) 2.5 MG tablet    Other Relevant Orders    CBC & Differential    Comprehensive Metabolic Panel    Microalbumin / Creatinine Urine Ratio - Urine, Clean Catch       Digestive    B12 deficiency    Relevant Medications    cyanocobalamin injection 1,000 mcg    Other Relevant Orders    Vitamin B12    Vitamin D deficiency    Relevant Orders    Vitamin D 25 Hydroxy       Endocrine    Diabetic nephropathy associated with type 2 diabetes mellitus (CMS/HCC)    Relevant Medications    metFORMIN (GLUCOPHAGE) 1000 MG tablet    Other Relevant Orders    Hemoglobin A1c       Other    Medicare annual wellness visit, subsequent - Primary      Other Visit Diagnoses     Long-term use of high-risk medication        Relevant Orders    Urine Drug Screen - Urine, Clean Catch    Breast cancer screening        Relevant Orders    Mammo Screening Digital Tomosynthesis Bilateral With CAD    Postmenopausal        Relevant Orders    DEXA Bone Density Axial    Encounter for screening mammogram for malignant neoplasm of breast         Relevant Orders    Mammo Screening Digital Tomosynthesis Bilateral With CAD    Candida albicans infection        groin, left side below pannus, sending nystatin        Patient Self-Management and Personalized Health Advice  The patient has been provided with information about: exercise and preventive services including:   · Annual Wellness Visit (AWV).    Outpatient Encounter Medications as of 7/15/2020   Medication Sig Dispense Refill   • ALPRAZolam (XANAX) 0.5 MG tablet Take 1 tablet by mouth 2 (Two) Times a Day As Needed for Anxiety. 40 tablet 2   • aspirin 81 MG EC tablet Take 81 mg by mouth Daily. Take one daily     • atenolol (TENORMIN) 50  MG tablet Take 1 tablet by mouth once daily 30 tablet 3   • atorvastatin (LIPITOR) 10 MG tablet Take 1 tablet by mouth 3 (Three) Times a Week. Take one tablet three times a week 39 tablet 3   • Cholecalciferol (VITAMIN D3) 2000 units tablet Take  by mouth. Take one daily     • Cyanocobalamin 1000 MCG/ML kit Inject  as directed. Take injection once a month     • glucose blood (FREESTYLE LITE) test strip 1 each by Other route Daily. Use as instructed 100 each 3   • hydrOXYzine (ATARAX) 25 MG tablet Take 1 tablet by mouth 3 (Three) Times a Day As Needed for Itching. 90 tablet 3   • Lancets (FREESTYLE) lancets Use once daily as instructed for blood sugar testing 100 each 3   • lisinopril (PRINIVIL,ZESTRIL) 2.5 MG tablet Take 1 tablet by mouth once daily 30 tablet 3   • Loratadine (CLARITIN) 10 MG capsule Take 1 capsule by mouth Daily. PRN     • metFORMIN (GLUCOPHAGE) 1000 MG tablet TAKE 1 TABLET BY MOUTH TWICE DAILY WITH MEALS 60 tablet 5   • Multiple Vitamins-Minerals (ICAPS AREDS 2 PO) Take 1 capsule by mouth Daily.     • nitroglycerin (NITROSTAT) 0.4 MG SL tablet Place 1 tablet under the tongue As Needed for Chest Pain. Take no more than 3 doses in 15 minutes. 25 tablet 5   • promethazine (PHENERGAN) 25 MG tablet Take 1 tablet by mouth Every 8 (Eight) Hours As Needed for Nausea or Vomiting. Take as needed for nausea and vomiting 30 tablet 1   • tiZANidine (ZANAFLEX) 4 MG tablet Take 1 tablet by mouth Every Night. 30 tablet 5   • nystatin (MYCOSTATIN) 333906 UNIT/GM ointment Apply  topically to the appropriate area as directed 2 (Two) Times a Day. 30 g 1   • [DISCONTINUED] azithromycin (ZITHROMAX Z-GOMEZ) 250 MG tablet Take 2 tablets the first day, then 1 tablet daily for 4 days. 6 tablet 0     Facility-Administered Encounter Medications as of 7/15/2020   Medication Dose Route Frequency Provider Last Rate Last Dose   • cyanocobalamin injection 1,000 mcg  1,000 mcg Intramuscular Q28 Days Kishore Grande MD   1,000  [facility] [place] at 20 1253       Reviewed use of high risk medication in the elderly: yes  Reviewed for potential of harmful drug interactions in the elderly: yes    Follow Up:  Return for Labs this visit, Next scheduled follow up.     Patient Instructions       Medicare Wellness  Personal Prevention Plan of Service     Date of Office Visit:  07/15/2020  Encounter Provider:  TRISTAN Bray  Place of Service:  [facility]  Patient Name: Tessa Adler  :  1935    As part of the Medicare Wellness portion of your visit today, we are providing you with this personalized preventive plan of services (PPPS). This plan is based upon recommendations of the [place] Preventive Services Task Force (USPSTF) and the Advisory Committee on Immunization Practices (ACIP).    This lists the preventive care services that should be considered, and provides dates of when you are due. Items listed as completed are up-to-date and do not require any further intervention.    Health Maintenance   Topic Date Due   • ZOSTER VACCINE (1 of 2) 1987   • DXA SCAN  2019   • DIABETIC EYE EXAM  10/30/2019   • MEDICARE ANNUAL WELLNESS  2020   • URINE MICROALBUMIN  2020   • HEMOGLOBIN A1C  2020   • INFLUENZA VACCINE  2020   • LIPID PANEL  2021   • TDAP/TD VACCINES (2 - Td) 2027   • Pneumococcal Vaccine Once at 65 Years Old  Completed       Orders Placed This Encounter   Procedures   • DEXA Bone Density Axial     Standing Status:   Future     Standing Expiration Date:   7/15/2021     Order Specific Question:   Reason for Exam:     Answer:   postmenopausal   • Mammo Screening Digital Tomosynthesis Bilateral With CAD     Standing Status:   Future     Standing Expiration Date:   7/15/2021     Order Specific Question:   Reason for Exam:     Answer:   breast cancer screening   • Comprehensive Metabolic Panel     Standing Status:   Future     Standing  Expiration Date:   7/15/2021   • Lipid Panel     Standing Status:   Future     Standing Expiration Date:   7/15/2021   • TSH Rfx On Abnormal To Free T4     Standing Status:   Future     Standing Expiration Date:   7/15/2021   • Microalbumin / Creatinine Urine Ratio - Urine, Clean Catch     Standing Status:   Future     Standing Expiration Date:   7/15/2021   • Hemoglobin A1c     Standing Status:   Future     Standing Expiration Date:   7/15/2021   • Vitamin B12     Standing Status:   Future     Standing Expiration Date:   7/15/2021   • Vitamin D 25 Hydroxy     Standing Status:   Future     Standing Expiration Date:   7/15/2021   • Urine Drug Screen - Urine, Clean Catch     Standing Status:   Future     Standing Expiration Date:   7/15/2021   • CBC & Differential     Standing Status:   Future     Standing Expiration Date:   7/15/2021     Order Specific Question:   Manual Differential     Answer:   No       Return for Labs this visit, Next scheduled follow up.        Skin Yeast Infection    A skin yeast infection is a condition in which there is an overgrowth of yeast (candida) that normally lives on the skin. This condition usually occurs in areas of the skin that are constantly warm and moist, such as the armpits or the groin.  What are the causes?  This condition is caused by a change in the normal balance of the yeast and bacteria that live on the skin.  What increases the risk?  You are more likely to develop this condition if you:  · Are obese.  · Are pregnant.  · Take birth control pills.  · Have diabetes.  · Take antibiotic medicines.  · Take steroid medicines.  · Are malnourished.  · Have a weak body defense system (immune system).  · Are 65 years of age or older.  · Wear tight clothing.  What are the signs or symptoms?  The most common symptom of this condition is itchiness in the affected area. Other symptoms include:  · Red, swollen area of the skin.  · Bumps on the skin.  How is this diagnosed?  · This  condition is diagnosed with a medical history and physical exam.  · Your health care provider may check for yeast by taking light scrapings of the skin to be viewed under a microscope.  How is this treated?  This condition is treated with medicine. Medicines may be prescribed or be available over the counter. The medicines may be:  · Taken by mouth (orally).  · Applied as a cream or powder to your skin.  Follow these instructions at home:    · Take or apply over-the-counter and prescription medicines only as told by your health care provider.  · Maintain a healthy weight. If you need help losing weight, talk with your health care provider.  · Keep your skin clean and dry.  · If you have diabetes, keep your blood sugar under control.  · Keep all follow-up visits as told by your health care provider. This is important.  Contact a health care provider if:  · Your symptoms go away and then return.  · Your symptoms do not get better with treatment.  · Your symptoms get worse.  · Your rash spreads.  · You have a fever or chills.  · You have new symptoms.  · You have new warmth or redness of your skin.  Summary  · A skin yeast infection is a condition in which there is an overgrowth of yeast (candida) that normally lives on the skin. This condition is caused by a change in the normal balance of the yeast and bacteria that live on the skin.  · Take or apply over-the-counter and prescription medicines only as told by your health care provider.  · Keep your skin clean and dry.  · Contact a health care provider if your symptoms do not get better with treatment.  This information is not intended to replace advice given to you by your health care provider. Make sure you discuss any questions you have with your health care provider.  Document Released: 09/04/2012 Document Revised: 05/07/2019 Document Reviewed: 05/07/2019  [employer] Patient Education © 2020 [employer]        An After Visit Summary and PPPS with all of these  plans were given to the patient.

## 2020-07-15 NOTE — PATIENT INSTRUCTIONS
Medicare Wellness  Personal Prevention Plan of Service     Date of Office Visit:  07/15/2020  Encounter Provider:  TRISTAN Bray  Place of Service:  [facility] INTER[facility] MED[facility]  Patient Name: Tessa Adler  :  1935    As part of the Medicare Wellness portion of your visit today, we are providing you with this personalized preventive plan of services (PPPS). This plan is based upon recommendations of the [place] Preventive Services Task Force (USPSTF) and the Advisory Committee on Immunization Practices (ACIP).    This lists the preventive care services that should be considered, and provides dates of when you are due. Items listed as completed are up-to-date and do not require any further intervention.    Health Maintenance   Topic Date Due   • ZOSTER VACCINE (1 of 2) 1987   • DXA SCAN  2019   • DIABETIC EYE EXAM  10/30/2019   • MEDICARE ANNUAL WELLNESS  2020   • URINE MICROALBUMIN  2020   • HEMOGLOBIN A1C  2020   • INFLUENZA VACCINE  2020   • LIPID PANEL  2021   • TDAP/TD VACCINES (2 - Td) 2027   • Pneumococcal Vaccine Once at 65 Years Old  Completed       Orders Placed This Encounter   Procedures   • DEXA Bone Density Axial     Standing Status:   Future     Standing Expiration Date:   7/15/2021     Order Specific Question:   Reason for Exam:     Answer:   postmenopausal   • Mammo Screening Digital Tomosynthesis Bilateral With CAD     Standing Status:   Future     Standing Expiration Date:   7/15/2021     Order Specific Question:   Reason for Exam:     Answer:   breast cancer screening   • Comprehensive Metabolic Panel     Standing Status:   Future     Standing Expiration Date:   7/15/2021   • Lipid Panel     Standing Status:   Future     Standing Expiration Date:   7/15/2021   • TSH Rfx On Abnormal To Free T4     Standing Status:   Future     Standing Expiration Date:   7/15/2021   • Microalbumin / Creatinine Urine  Ratio - Urine, Clean Catch     Standing Status:   Future     Standing Expiration Date:   7/15/2021   • Hemoglobin A1c     Standing Status:   Future     Standing Expiration Date:   7/15/2021   • Vitamin B12     Standing Status:   Future     Standing Expiration Date:   7/15/2021   • Vitamin D 25 Hydroxy     Standing Status:   Future     Standing Expiration Date:   7/15/2021   • Urine Drug Screen - Urine, Clean Catch     Standing Status:   Future     Standing Expiration Date:   7/15/2021   • CBC & Differential     Standing Status:   Future     Standing Expiration Date:   7/15/2021     Order Specific Question:   Manual Differential     Answer:   No       Return for Labs this visit, Next scheduled follow up.        Skin Yeast Infection    A skin yeast infection is a condition in which there is an overgrowth of yeast (candida) that normally lives on the skin. This condition usually occurs in areas of the skin that are constantly warm and moist, such as the armpits or the groin.  What are the causes?  This condition is caused by a change in the normal balance of the yeast and bacteria that live on the skin.  What increases the risk?  You are more likely to develop this condition if you:  · Are obese.  · Are pregnant.  · Take birth control pills.  · Have diabetes.  · Take antibiotic medicines.  · Take steroid medicines.  · Are malnourished.  · Have a weak body defense system (immune system).  · Are 65 years of age or older.  · Wear tight clothing.  What are the signs or symptoms?  The most common symptom of this condition is itchiness in the affected area. Other symptoms include:  · Red, swollen area of the skin.  · Bumps on the skin.  How is this diagnosed?  · This condition is diagnosed with a medical history and physical exam.  · Your health care provider may check for yeast by taking light scrapings of the skin to be viewed under a microscope.  How is this treated?  This condition is treated with medicine. Medicines may  be prescribed or be available over the counter. The medicines may be:  · Taken by mouth (orally).  · Applied as a cream or powder to your skin.  Follow these instructions at home:    · Take or apply over-the-counter and prescription medicines only as told by your health care provider.  · Maintain a healthy weight. If you need help losing weight, talk with your health care provider.  · Keep your skin clean and dry.  · If you have diabetes, keep your blood sugar under control.  · Keep all follow-up visits as told by your health care provider. This is important.  Contact a health care provider if:  · Your symptoms go away and then return.  · Your symptoms do not get better with treatment.  · Your symptoms get worse.  · Your rash spreads.  · You have a fever or chills.  · You have new symptoms.  · You have new warmth or redness of your skin.  Summary  · A skin yeast infection is a condition in which there is an overgrowth of yeast (candida) that normally lives on the skin. This condition is caused by a change in the normal balance of the yeast and bacteria that live on the skin.  · Take or apply over-the-counter and prescription medicines only as told by your health care provider.  · Keep your skin clean and dry.  · Contact a health care provider if your symptoms do not get better with treatment.  This information is not intended to replace advice given to you by your health care provider. Make sure you discuss any questions you have with your health care provider.  Document Released: 09/04/2012 Document Revised: 05/07/2019 Document Reviewed: 05/07/2019  [employer] Patient Education © 2020 [employer]

## 2020-07-16 LAB
25(OH)D3 SERPL-MCNC: 37 NG/ML (ref 30–100)
ARTICHOKE IGE QN: 106 MG/DL (ref 0–100)
TSH SERPL DL<=0.05 MIU/L-ACNC: 2.33 UIU/ML (ref 0.27–4.2)
VIT B12 BLD-MCNC: >2000 PG/ML (ref 211–946)

## 2020-07-20 ENCOUNTER — HOSPITAL ENCOUNTER (OUTPATIENT)
Dept: MAMMOGRAPHY | Facility: HOSPITAL | Age: 85
Discharge: HOME | End: 2020-07-20
Admitting: NURSE PRACTITIONER

## 2020-07-20 DIAGNOSIS — Z12.39 BREAST CANCER SCREENING: ICD-10-CM

## 2020-07-20 DIAGNOSIS — Z12.31 ENCOUNTER FOR SCREENING MAMMOGRAM FOR MALIGNANT NEOPLASM OF BREAST: ICD-10-CM

## 2020-07-20 PROCEDURE — 77063 BREAST TOMOSYNTHESIS BI: CPT

## 2020-07-20 PROCEDURE — 77067 SCR MAMMO BI INCL CAD: CPT | Performed by: RADIOLOGY

## 2020-07-20 PROCEDURE — 77063 BREAST TOMOSYNTHESIS BI: CPT | Performed by: RADIOLOGY

## 2020-07-20 PROCEDURE — 77067 SCR MAMMO BI INCL CAD: CPT

## 2020-07-22 ENCOUNTER — OFFICE VISIT (OUTPATIENT)
Dept: INTERNAL MEDICINE | Facility: CLINIC | Age: 85
End: 2020-07-22

## 2020-07-22 VITALS
BODY MASS INDEX: 25.92 KG/M2 | WEIGHT: 151.8 LBS | DIASTOLIC BLOOD PRESSURE: 80 MMHG | SYSTOLIC BLOOD PRESSURE: 138 MMHG | HEIGHT: 64 IN | TEMPERATURE: 97.7 F | HEART RATE: 68 BPM

## 2020-07-22 DIAGNOSIS — E11.21 DIABETIC NEPHROPATHY ASSOCIATED WITH TYPE 2 DIABETES MELLITUS (HCC): Primary | ICD-10-CM

## 2020-07-22 DIAGNOSIS — I25.10 ASHD (ARTERIOSCLEROTIC HEART DISEASE): ICD-10-CM

## 2020-07-22 DIAGNOSIS — E53.8 B12 DEFICIENCY: ICD-10-CM

## 2020-07-22 DIAGNOSIS — E11.42 DIABETIC POLYNEUROPATHY ASSOCIATED WITH TYPE 2 DIABETES MELLITUS (HCC): ICD-10-CM

## 2020-07-22 DIAGNOSIS — N18.30 CKD (CHRONIC KIDNEY DISEASE), STAGE III (HCC): ICD-10-CM

## 2020-07-22 DIAGNOSIS — E78.2 MIXED HYPERLIPIDEMIA: ICD-10-CM

## 2020-07-22 DIAGNOSIS — I10 BENIGN ESSENTIAL HYPERTENSION: ICD-10-CM

## 2020-07-22 DIAGNOSIS — E55.9 VITAMIN D DEFICIENCY: ICD-10-CM

## 2020-07-22 PROCEDURE — 99214 OFFICE O/P EST MOD 30 MIN: CPT | Performed by: INTERNAL MEDICINE

## 2020-07-22 NOTE — PROGRESS NOTES
[facility]     Tessa Adler  1935   [id number]      Patient Care Team:  Kishore Grande MD as PCP - General  Kishore Grande MD as PCP - Family Medicine  Kishore Grande MD as PCP - Claims Attributed    Chief Complaint::   Chief Complaint   Patient presents with   • Hyperlipidemia   • Hypertension   • Diabetes        HPI  Mrs. Adler is now 84.  She comes in for follow-up of her diabetes, hypertension, hyperlipidemia, coronary artery disease, B12 and vitamin D deficiency, and chronic kidney disease.  Overall she feels well.  There is no chest pain, shortness of breath, fever or cough.  She wears a mask when she is in public.  Her blood glucoses have ranged between 110 and 150.  She notices that at night when she is in bed her feet feel swollen but they are not swollen.  There is no loss of sensation.  She is currently taking atorvastatin 3 days a week.  If she takes it more frequently she has constipation.  She is taking alprazolam at bedtime which works well, but sometimes she needs a dose during the day but that is infrequent.    Chronic Conditions:      Patient Active Problem List   Diagnosis   • B12 deficiency   • Mixed hyperlipidemia   • Diabetes mellitus without complication (CMS/HCC)   • GERD without esophagitis   • Benign essential hypertension   • ASHD (arteriosclerotic heart disease)   • Vitamin D deficiency   • CKD (chronic kidney disease), stage III (CMS/HCC)   • Annual physical exam   • BMI 25.0-25.9,adult   • Diabetic nephropathy associated with type 2 diabetes mellitus (CMS/HCC)   • Hyperlipidemia due to type 2 diabetes mellitus (CMS/HCC)   • Idiopathic osteoarthritis   • Insomnia   • Irritable bowel syndrome   • Medicare annual wellness visit, subsequent   • Osteoarthritis   • Postmenopausal osteoporosis   • Trigger finger of both hands   • Diabetic polyneuropathy associated with type 2 diabetes mellitus (CMS/HCC)        Past Medical History:    Diagnosis Date   • ASHD (arteriosclerotic heart disease)    • B12 deficiency    • Rader's esophagus    • Benign essential hypertension    • CKD (chronic kidney disease), stage III (CMS/HCC)    • Diabetes mellitus without complication (CMS/HCC)    • GERD without esophagitis    • Hemorrhoids    • History of colonic polyps    • Mixed hyperlipidemia    • Myocardial infarction (CMS/HCC)     NONSTEMI   • Stress-induced cardiomyopathy 02/27/2015   • Vitamin D deficiency        Past Surgical History:   Procedure Laterality Date   • CARPAL TUNNEL RELEASE  1989   • CATARACT EXTRACTION     • CHOLECYSTECTOMY  2001   • CORONARY ANGIOPLASTY WITH STENT PLACEMENT  2010    PTCA, stents, LAD   • HEMORRHOIDECTOMY  1965   • HYSTERECTOMY  1968    AGE 33   • OOPHORECTOMY Bilateral     AGE 33       Family History   Problem Relation Age of Onset   • Breast cancer Neg Hx    • Ovarian cancer Neg Hx        Social History     Socioeconomic History   • Marital status:      Spouse name: Not on file   • Number of children: Not on file   • Years of education: Not on file   • Highest education level: Not on file   Tobacco Use   • Smoking status: Never Smoker   • Smokeless tobacco: Never Used   Substance and Sexual Activity   • Alcohol use: No     Frequency: Never   • Drug use: No   • Sexual activity: Defer       Allergies   Allergen Reactions   • Clarithromycin Unknown (See Comments)     Unknown  Biaxin   • Codeine Unknown (See Comments)     unknown   • Dobutamine Unknown (See Comments)     unknown   • Iodinated Diagnostic Agents Unknown (See Comments)     unknown   • Ioversol Swelling     Tongue swelling  Contrast Dye    • Pantoprazole Sodium Unknown (See Comments)     Unknown  Protonix    • Perflutren Lipid Microsphere Unknown (See Comments)     Unknown  Definity    • Sulfa Antibiotics Unknown (See Comments)     Unknown  Bactrim   • Trimethoprim Unknown (See Comments)     Unknown  Bactrim         Current Outpatient Medications:   •   ALPRAZolam (XANAX) 0.5 MG tablet, Take 1 tablet by mouth 2 (Two) Times a Day As Needed for Anxiety., Disp: 40 tablet, Rfl: 2  •  aspirin 81 MG EC tablet, Take 81 mg by mouth Daily. Take one daily, Disp: , Rfl:   •  atenolol (TENORMIN) 50 MG tablet, Take 1 tablet by mouth once daily, Disp: 30 tablet, Rfl: 3  •  atorvastatin (LIPITOR) 10 MG tablet, Take 1 tablet by mouth 3 (Three) Times a Week. Take one tablet three times a week, Disp: 39 tablet, Rfl: 3  •  Cholecalciferol (VITAMIN D3) 2000 units tablet, Take  by mouth. Take one daily, Disp: , Rfl:   •  Cyanocobalamin 1000 MCG/ML kit, Inject  as directed. Take injection once a month, Disp: , Rfl:   •  glucose blood (FREESTYLE LITE) test strip, 1 each by Other route Daily. Use as instructed, Disp: 100 each, Rfl: 3  •  hydrOXYzine (ATARAX) 25 MG tablet, Take 1 tablet by mouth 3 (Three) Times a Day As Needed for Itching., Disp: 90 tablet, Rfl: 3  •  Lancets (FREESTYLE) lancets, Use once daily as instructed for blood sugar testing, Disp: 100 each, Rfl: 3  •  lisinopril (PRINIVIL,ZESTRIL) 2.5 MG tablet, Take 1 tablet by mouth once daily, Disp: 30 tablet, Rfl: 3  •  Loratadine (CLARITIN) 10 MG capsule, Take 1 capsule by mouth Daily. PRN, Disp: , Rfl:   •  metFORMIN (GLUCOPHAGE) 1000 MG tablet, TAKE 1 TABLET BY MOUTH TWICE DAILY WITH MEALS, Disp: 60 tablet, Rfl: 5  •  Multiple Vitamins-Minerals (ICAPS AREDS 2 PO), Take 1 capsule by mouth Daily., Disp: , Rfl:   •  nitroglycerin (NITROSTAT) 0.4 MG SL tablet, Place 1 tablet under the tongue As Needed for Chest Pain. Take no more than 3 doses in 15 minutes., Disp: 25 tablet, Rfl: 5  •  nystatin (MYCOSTATIN) 288817 UNIT/GM ointment, Apply  topically to the appropriate area as directed 2 (Two) Times a Day., Disp: 30 g, Rfl: 1  •  promethazine (PHENERGAN) 25 MG tablet, Take 1 tablet by mouth Every 8 (Eight) Hours As Needed for Nausea or Vomiting. Take as needed for nausea and vomiting, Disp: 30 tablet, Rfl: 1  •  tiZANidine  "(ZANAFLEX) 4 MG tablet, Take 1 tablet by mouth Every Night., Disp: 30 tablet, Rfl: 5    Current Facility-Administered Medications:   •  cyanocobalamin injection 1,000 mcg, 1,000 mcg, Intramuscular, Q28 Days, Kishore Grande MD, 1,000 mcg at 07/15/20 1151    Review of Systems   Constitutional: Negative for chills, fatigue and fever.   HENT: Negative for congestion, ear pain and sinus pressure.    Respiratory: Negative for cough, chest tightness, shortness of breath and wheezing.    Cardiovascular: Negative for chest pain and palpitations.   Gastrointestinal: Negative for abdominal pain, blood in stool and constipation.   Skin: Negative for color change.   Allergic/Immunologic: Negative for environmental allergies.   Neurological: Negative for dizziness, speech difficulty and headache.   Psychiatric/Behavioral: Negative for decreased concentration. The patient is not nervous/anxious.         Vital Signs  Vitals:    07/22/20 0921   BP: 138/80   BP Location: Left arm   Patient Position: Sitting   Cuff Size: Adult   Pulse: 68   Temp: 97.7 °F (36.5 °C)   Weight: 68.9 kg (151 lb 12.8 oz)   Height: 162.6 cm (64.02\")   PainSc:   3   PainLoc: Knee       Physical Exam   Constitutional: She is oriented to person, place, and time. She appears well-developed and well-nourished.   HENT:   Head: Normocephalic and atraumatic.   Right Ear: External ear normal.   Left Ear: External ear normal.   Nose: Nose normal.   Mouth/Throat: Oropharynx is clear and moist. No oropharyngeal exudate.   Eyes: Pupils are equal, round, and reactive to light. Conjunctivae and EOM are normal.   Neck: Normal range of motion. Neck supple. No JVD present. No thyromegaly present.   Cardiovascular: Normal rate, regular rhythm, normal heart sounds and intact distal pulses. Exam reveals no gallop and no friction rub.   No murmur heard.  Pulmonary/Chest: Effort normal and breath sounds normal. No respiratory distress. She has no wheezes. She has no rales. "   Abdominal: Soft. Bowel sounds are normal. She exhibits no distension and no mass. There is no tenderness. There is no rebound and no guarding. No hernia.   Musculoskeletal: Normal range of motion. She exhibits no edema or tenderness.    Tessa had a diabetic foot exam performed today.   During the foot exam she had a monofilament test performed (Sensation in both feet is intact to monofilament.).  Vascular Status -  Her right foot exhibits normal foot vasculature  and no edema. Her left foot exhibits normal foot vasculature  and no edema.  Skin Integrity  -  Her right foot skin is intact.Her left foot skin is intact..  Lymphadenopathy:     She has no cervical adenopathy.   Neurological: She is alert and oriented to person, place, and time. She displays normal reflexes. No cranial nerve deficit or sensory deficit. She exhibits normal muscle tone. Coordination normal.   Skin: Skin is warm and dry. No rash noted. No erythema.   Psychiatric: She has a normal mood and affect. Her behavior is normal. Judgment and thought content normal.   Nursing note and vitals reviewed.     Procedures    ACE III MINI             Assessment/Plan:    Tessa was seen today for hyperlipidemia, hypertension and diabetes.    Diagnoses and all orders for this visit:    Diabetic nephropathy associated with type 2 diabetes mellitus (CMS/[facility])    Benign essential hypertension    Mixed hyperlipidemia    ASHD (arteriosclerotic heart disease)    B12 deficiency    Vitamin D deficiency    CKD (chronic kidney disease), stage III (CMS/[facility])    Diabetic polyneuropathy associated with type 2 diabetes mellitus (CMS/[facility])    Plan    A1c is well controlled at 6.8.  Foot exam was performed today.  She has intact protective sensation but now has paresthesias which represents early neuropathy.  She will continue healthy diet and metformin.    Blood pressures well controlled on lisinopril and atenolol.    Triglycerides are up significantly at 431.  LDL at 106  is not at goal.  She will again attempt to take atorvastatin daily.  She will also resume fish oil to help lower her triglycerides.    Coronary artery disease is asymptomatic on aspirin, atenolol, lisinopril and atorvastatin.    Vitamin B12 and vitamin D levels are normal.  She will continue current supplements.    GFR is improved at 59.  The treatment remains control of blood glucose and blood pressure, and avoidance of NSAIDs.      Plan of care reviewed with patient at the conclusion of today's visit. Education was provided regarding diagnosis, management, and any prescribed or recommended OTC medications.Patient verbalizes understanding of and agreement with management plan.         Kishore Grande MD

## 2020-08-01 ENCOUNTER — APPOINTMENT (OUTPATIENT)
Dept: BONE DENSITY | Facility: HOSPITAL | Age: 85
End: 2020-08-01

## 2020-08-14 ENCOUNTER — CLINICAL SUPPORT (OUTPATIENT)
Dept: INTERNAL MEDICINE | Facility: CLINIC | Age: 85
End: 2020-08-14

## 2020-08-14 DIAGNOSIS — E53.8 B12 DEFICIENCY: ICD-10-CM

## 2020-08-14 DIAGNOSIS — F41.9 ANXIETY: ICD-10-CM

## 2020-08-14 PROCEDURE — 96372 THER/PROPH/DIAG INJ SC/IM: CPT | Performed by: INTERNAL MEDICINE

## 2020-08-14 RX ORDER — ALPRAZOLAM 0.5 MG/1
0.5 TABLET ORAL 2 TIMES DAILY PRN
Qty: 40 TABLET | Refills: 2 | Status: SHIPPED | OUTPATIENT
Start: 2020-08-14 | End: 2020-12-09 | Stop reason: SDUPTHER

## 2020-08-14 RX ADMIN — CYANOCOBALAMIN 1000 MCG: 1000 INJECTION, SOLUTION INTRAMUSCULAR; SUBCUTANEOUS at 09:13

## 2020-09-03 RX ORDER — LISINOPRIL 2.5 MG/1
TABLET ORAL
Qty: 30 TABLET | Refills: 5 | Status: SHIPPED | OUTPATIENT
Start: 2020-09-03 | End: 2020-09-20 | Stop reason: SDUPTHER

## 2020-09-03 RX ORDER — ATENOLOL 50 MG/1
TABLET ORAL
Qty: 30 TABLET | Refills: 5 | Status: SHIPPED | OUTPATIENT
Start: 2020-09-03 | End: 2020-09-18

## 2020-09-09 ENCOUNTER — CLINICAL SUPPORT (OUTPATIENT)
Dept: INTERNAL MEDICINE | Facility: CLINIC | Age: 85
End: 2020-09-09

## 2020-09-09 ENCOUNTER — TELEPHONE (OUTPATIENT)
Dept: INTERNAL MEDICINE | Facility: CLINIC | Age: 85
End: 2020-09-09

## 2020-09-09 DIAGNOSIS — E53.8 B12 DEFICIENCY: ICD-10-CM

## 2020-09-09 PROCEDURE — 96372 THER/PROPH/DIAG INJ SC/IM: CPT | Performed by: INTERNAL MEDICINE

## 2020-09-09 RX ORDER — ATORVASTATIN CALCIUM 10 MG/1
10 TABLET, FILM COATED ORAL 3 TIMES WEEKLY
Qty: 39 TABLET | Refills: 3 | Status: SHIPPED | OUTPATIENT
Start: 2020-09-09 | End: 2020-09-18 | Stop reason: SDUPTHER

## 2020-09-09 RX ORDER — ATORVASTATIN CALCIUM 10 MG/1
10 TABLET, FILM COATED ORAL DAILY
Qty: 90 TABLET | Refills: 1 | Status: CANCELLED | OUTPATIENT
Start: 2020-09-09

## 2020-09-09 RX ADMIN — CYANOCOBALAMIN 1000 MCG: 1000 INJECTION, SOLUTION INTRAMUSCULAR; SUBCUTANEOUS at 11:29

## 2020-09-09 NOTE — TELEPHONE ENCOUNTER
Patient received a letter yesterday in the mail with [facility]W name on it and lab results. She states the results do not match what you all discussed at her last visit. I do not see in the patients chart where a letter was made recently. Last letter is from January. Patient will come in for a B12 injection today and I advised her we would review the letter when she arrives.

## 2020-09-09 NOTE — TELEPHONE ENCOUNTER
Patient brought in letter. For some reason the  mailed her lab letter from 07/23/19. Unsure why but we resolved the confusion. Based off the last ov note patient needs a new prescription for atorvastatin called in. She also wants to know which fish oil strength she should get and how often she should take it. Please advise.

## 2020-09-09 NOTE — TELEPHONE ENCOUNTER
Pt said she had blood work done back in July and then had an appt with Dr. Grande to go over results.  She said she also received print out of results at that appt.  She said she received a letter yesterday in the mail dated July 23.  She the letter she received yesterday has her name on with blood work results from July but the results are not the same as the printout she received back in July.  Pt does not know which results are correct.  Pt requesting a call back to see why she received two different results.

## 2020-09-16 ENCOUNTER — TELEPHONE (OUTPATIENT)
Dept: INTERNAL MEDICINE | Facility: CLINIC | Age: 85
End: 2020-09-16

## 2020-09-16 RX ORDER — BISACODYL 10 MG
10 SUPPOSITORY, RECTAL RECTAL DAILY
Qty: 4 EACH | Refills: 0 | Status: SHIPPED | OUTPATIENT
Start: 2020-09-16 | End: 2021-01-22

## 2020-09-16 NOTE — TELEPHONE ENCOUNTER
Pt called because she had a heart attack on Saturday 9/12/2020. She had 2 stents put in. She was discharged yesterday with a Hospital FU scheduled for 9/18/20.    Pt has not had a bowel movement since Thursday or Friday. She has had warm prune juice Sun, Mon, Tues, Wed. Typically this will produce a movement within 2 hours but this has not worked. She started taking Miralax yesterday and today, 1 cap full on both days.  Still no bowel movement. Pt wants to know what else can she take to produce a BM. Patient is in pain.

## 2020-09-16 NOTE — TELEPHONE ENCOUNTER
I sent in dulcolax suppositories.  She should continue both miralax and prune juice until she has BM.

## 2020-09-18 ENCOUNTER — OFFICE VISIT (OUTPATIENT)
Dept: INTERNAL MEDICINE | Facility: CLINIC | Age: 85
End: 2020-09-18

## 2020-09-18 ENCOUNTER — TELEPHONE (OUTPATIENT)
Dept: INTERNAL MEDICINE | Facility: CLINIC | Age: 85
End: 2020-09-18

## 2020-09-18 VITALS
HEART RATE: 88 BPM | BODY MASS INDEX: 25.51 KG/M2 | WEIGHT: 149.4 LBS | SYSTOLIC BLOOD PRESSURE: 136 MMHG | TEMPERATURE: 97.7 F | HEIGHT: 64 IN | DIASTOLIC BLOOD PRESSURE: 86 MMHG

## 2020-09-18 DIAGNOSIS — I10 BENIGN ESSENTIAL HYPERTENSION: ICD-10-CM

## 2020-09-18 DIAGNOSIS — E11.21 DIABETIC NEPHROPATHY ASSOCIATED WITH TYPE 2 DIABETES MELLITUS (HCC): ICD-10-CM

## 2020-09-18 DIAGNOSIS — I21.4 NSTEMI (NON-ST ELEVATED MYOCARDIAL INFARCTION) (HCC): Primary | ICD-10-CM

## 2020-09-18 DIAGNOSIS — N18.30 CKD (CHRONIC KIDNEY DISEASE), STAGE III (HCC): ICD-10-CM

## 2020-09-18 DIAGNOSIS — E78.2 MIXED HYPERLIPIDEMIA: ICD-10-CM

## 2020-09-18 PROCEDURE — 99213 OFFICE O/P EST LOW 20 MIN: CPT | Performed by: INTERNAL MEDICINE

## 2020-09-18 RX ORDER — AMOXICILLIN 500 MG
1200 CAPSULE ORAL DAILY
COMMUNITY
End: 2021-01-22

## 2020-09-18 RX ORDER — ATORVASTATIN CALCIUM 10 MG/1
10 TABLET, FILM COATED ORAL DAILY
Qty: 90 TABLET | Refills: 3 | Status: SHIPPED | OUTPATIENT
Start: 2020-09-18 | End: 2021-12-20

## 2020-09-18 RX ORDER — ATORVASTATIN CALCIUM 10 MG/1
10 TABLET, FILM COATED ORAL DAILY
Qty: 90 TABLET | Refills: 3 | Status: SHIPPED | OUTPATIENT
Start: 2020-09-18 | End: 2020-09-18 | Stop reason: SDUPTHER

## 2020-09-18 RX ORDER — POLYETHYLENE GLYCOL 3350 17 G/17G
17 POWDER, FOR SOLUTION ORAL DAILY PRN
COMMUNITY
End: 2021-08-02

## 2020-09-18 RX ORDER — CARVEDILOL 6.25 MG/1
6.25 TABLET ORAL 2 TIMES DAILY WITH MEALS
COMMUNITY

## 2020-09-18 NOTE — TELEPHONE ENCOUNTER
KATHY WITH [facility] IS REQUESTING CLARIFICATION ON     atorvastatin (LIPITOR) 10 MG tablet    KATHY CALL BACK [phone]

## 2020-09-18 NOTE — TELEPHONE ENCOUNTER
Med updated but I accidentally approved instead of pending the order if you will just cosign the order. Thank you.

## 2020-09-18 NOTE — PROGRESS NOTES
"[facility]     Tessa Adler  1935   [id number]      Patient Care Team:  Kishore Grande MD as PCP - General  Kishore Grande MD as PCP - Family Medicine  Kishore Grande MD as PCP - Claims Attributed    Chief Complaint::   Chief Complaint   Patient presents with   • Hospital Follow Up Visit        HPI  Mrs. Adler was admitted to [facility] on the 12th of this month with a non-STEMI.  CT angiogram of the chest was negative\" cardiac enzymes were positive.  She was placed on Brilinta, aspirin and heparin cardiac catheterization was performed which showed an 80% mid LAD in-stent restenosis which was restented.  She was discharged in much improved condition and feels well today.    Chronic Conditions:      Patient Active Problem List   Diagnosis   • B12 deficiency   • Mixed hyperlipidemia   • Diabetes mellitus without complication (CMS/HCC)   • GERD without esophagitis   • Benign essential hypertension   • ASHD (arteriosclerotic heart disease)   • Vitamin D deficiency   • CKD (chronic kidney disease), stage III (CMS/HCC)   • Annual physical exam   • BMI 25.0-25.9,adult   • Diabetic nephropathy associated with type 2 diabetes mellitus (CMS/HCC)   • Hyperlipidemia due to type 2 diabetes mellitus (CMS/HCC)   • Idiopathic osteoarthritis   • Insomnia   • Irritable bowel syndrome   • Medicare annual wellness visit, subsequent   • Osteoarthritis   • Postmenopausal osteoporosis   • Trigger finger of both hands   • Diabetic polyneuropathy associated with type 2 diabetes mellitus (CMS/HCC)        Past Medical History:   Diagnosis Date   • ASHD (arteriosclerotic heart disease)    • B12 deficiency    • Rader's esophagus    • Benign essential hypertension    • CKD (chronic kidney disease), stage III (CMS/HCC)    • Diabetes mellitus without complication (CMS/HCC)    • GERD without esophagitis    • Hemorrhoids    • History of colonic polyps    • Mixed hyperlipidemia    • " Myocardial infarction (CMS/HCC)     NONSTEMI   • Stress-induced cardiomyopathy 02/27/2015   • Vitamin D deficiency        Past Surgical History:   Procedure Laterality Date   • CARPAL TUNNEL RELEASE  1989   • CATARACT EXTRACTION     • CHOLECYSTECTOMY  2001   • CORONARY ANGIOPLASTY WITH STENT PLACEMENT  2010    PTCA, stents, LAD   • HEMORRHOIDECTOMY  1965   • HYSTERECTOMY  1968    AGE 33   • OOPHORECTOMY Bilateral     AGE 33       Family History   Problem Relation Age of Onset   • Breast cancer Neg Hx    • Ovarian cancer Neg Hx        Social History     Socioeconomic History   • Marital status:      Spouse name: Not on file   • Number of children: Not on file   • Years of education: Not on file   • Highest education level: Not on file   Tobacco Use   • Smoking status: Never Smoker   • Smokeless tobacco: Never Used   Substance and Sexual Activity   • Alcohol use: No     Frequency: Never   • Drug use: No   • Sexual activity: Defer       Allergies   Allergen Reactions   • Clarithromycin Unknown (See Comments)     Unknown  Biaxin   • Codeine Unknown (See Comments)     unknown   • Dobutamine Unknown (See Comments)     unknown   • Iodinated Diagnostic Agents Unknown (See Comments)     unknown   • Ioversol Swelling     Tongue swelling  Contrast Dye    • Pantoprazole Sodium Unknown (See Comments)     Unknown  Protonix    • Perflutren Lipid Microsphere Unknown (See Comments)     Unknown  Definity    • Sulfa Antibiotics Unknown (See Comments)     Unknown  Bactrim   • Trimethoprim Unknown (See Comments)     Unknown  Bactrim         Current Outpatient Medications:   •  ALPRAZolam (XANAX) 0.5 MG tablet, Take 1 tablet by mouth 2 (Two) Times a Day As Needed for Anxiety., Disp: 40 tablet, Rfl: 2  •  aspirin 81 MG EC tablet, Take 81 mg by mouth Daily. Take one daily, Disp: , Rfl:   •  bisacodyl (Dulcolax) 10 MG suppository, Insert 1 suppository into the rectum Daily., Disp: 4 each, Rfl: 0  •  carvedilol (COREG) 6.25 MG tablet,  Take 6.25 mg by mouth 2 (Two) Times a Day With Meals., Disp: , Rfl:   •  Cholecalciferol (VITAMIN D3) 2000 units tablet, Take  by mouth. Take one daily, Disp: , Rfl:   •  Cyanocobalamin 1000 MCG/ML kit, Inject  as directed. Take injection once a month, Disp: , Rfl:   •  glucose blood (FREESTYLE LITE) test strip, 1 each by Other route Daily. Use as instructed, Disp: 100 each, Rfl: 3  •  hydrOXYzine (ATARAX) 25 MG tablet, Take 1 tablet by mouth 3 (Three) Times a Day As Needed for Itching., Disp: 90 tablet, Rfl: 3  •  Lancets (FREESTYLE) lancets, Use once daily as instructed for blood sugar testing, Disp: 100 each, Rfl: 3  •  lisinopril (PRINIVIL,ZESTRIL) 2.5 MG tablet, Take 4 tablets by mouth Daily., Disp: , Rfl:   •  metFORMIN (GLUCOPHAGE) 1000 MG tablet, TAKE 1 TABLET BY MOUTH TWICE DAILY WITH MEALS, Disp: 60 tablet, Rfl: 5  •  Multiple Vitamins-Minerals (ICAPS AREDS 2 PO), Take 1 capsule by mouth Daily., Disp: , Rfl:   •  nitroglycerin (NITROSTAT) 0.4 MG SL tablet, Place 1 tablet under the tongue As Needed for Chest Pain. Take no more than 3 doses in 15 minutes., Disp: 25 tablet, Rfl: 5  •  Omega-3 Fatty Acids (fish oil) 1200 MG capsule capsule, Take 1,200 mg by mouth Daily., Disp: , Rfl:   •  polyethylene glycol (MiraLax) 17 GM/SCOOP powder, Take 17 g by mouth Daily As Needed., Disp: , Rfl:   •  promethazine (PHENERGAN) 25 MG tablet, Take 1 tablet by mouth Every 8 (Eight) Hours As Needed for Nausea or Vomiting. Take as needed for nausea and vomiting, Disp: 30 tablet, Rfl: 1  •  ticagrelor (Brilinta) 90 MG tablet tablet, Take 90 mg by mouth 2 (Two) Times a Day., Disp: , Rfl:   •  tiZANidine (ZANAFLEX) 4 MG tablet, Take 1 tablet by mouth Every Night. (Patient taking differently: Take 4 mg by mouth At Night As Needed.), Disp: 30 tablet, Rfl: 5  •  atorvastatin (LIPITOR) 10 MG tablet, Take 1 tablet by mouth Daily., Disp: 90 tablet, Rfl: 3    Current Facility-Administered Medications:   •  cyanocobalamin injection  "1,000 mcg, 1,000 mcg, Intramuscular, Q28 Days, Kishore Grande MD, 1,000 mcg at 09/09/20 1129    Review of Systems   Constitutional: Positive for fatigue. Negative for chills and fever.   HENT: Negative for congestion, ear pain and sinus pressure.    Respiratory: Negative for cough, chest tightness, shortness of breath and wheezing.    Cardiovascular: Negative for chest pain and palpitations.   Gastrointestinal: Positive for abdominal pain. Negative for blood in stool and constipation.   Skin: Negative for color change.   Allergic/Immunologic: Negative for environmental allergies.   Neurological: Negative for dizziness, speech difficulty and headache.   Psychiatric/Behavioral: Negative for decreased concentration. The patient is not nervous/anxious.         Vital Signs  Vitals:    09/18/20 1454   BP: 136/86   BP Location: Left arm   Patient Position: Sitting   Cuff Size: Adult   Pulse: 88   Temp: 97.7 °F (36.5 °C)   Weight: 67.8 kg (149 lb 6.4 oz)   Height: 162.6 cm (64.02\")   PainSc:   5   PainLoc: Abdomen       Physical Exam  Vitals signs reviewed.   Constitutional:       Appearance: She is well-developed.   HENT:      Head: Normocephalic and atraumatic.   Cardiovascular:      Rate and Rhythm: Normal rate and regular rhythm.      Heart sounds: Normal heart sounds. No murmur.   Pulmonary:      Effort: Pulmonary effort is normal.      Breath sounds: Normal breath sounds.   Neurological:      Mental Status: She is alert and oriented to person, place, and time.        Procedures    ACE III MINI             Assessment/Plan:    Tessa was seen today for hospital follow up visit.    Diagnoses and all orders for this visit:    NSTEMI (non-ST elevated myocardial infarction) (CMS/HCC)    Diabetic nephropathy associated with type 2 diabetes mellitus (CMS/HCC)    Benign essential hypertension    Mixed hyperlipidemia    CKD (chronic kidney disease), stage III (CMS/HCC)    Other orders  -     Discontinue: atorvastatin " (LIPITOR) 10 MG tablet; Take 1 tablet by mouth Daily. Take one tablet three times a week  -     lisinopril (PRINIVIL,ZESTRIL) 2.5 MG tablet; Take 4 tablets by mouth Daily.    Plan    She is doing very well status post NSTEMI on appropriate medications.  She see cardiology next month in follow up.  She will continue current medications.       Plan of care reviewed with patient at the conclusion of today's visit. Education was provided regarding diagnosis, management, and any prescribed or recommended OTC medications.Patient verbalizes understanding of and agreement with management plan.         Kishore Grande MD

## 2020-09-20 RX ORDER — LISINOPRIL 2.5 MG/1
10 TABLET ORAL DAILY
Start: 2020-09-20 | End: 2021-08-02

## 2020-10-13 ENCOUNTER — CLINICAL SUPPORT (OUTPATIENT)
Dept: INTERNAL MEDICINE | Facility: CLINIC | Age: 85
End: 2020-10-13

## 2020-10-13 DIAGNOSIS — E53.8 B12 DEFICIENCY: ICD-10-CM

## 2020-10-13 DIAGNOSIS — Z23 NEED FOR INFLUENZA VACCINATION: ICD-10-CM

## 2020-10-13 PROCEDURE — G0008 ADMIN INFLUENZA VIRUS VAC: HCPCS | Performed by: INTERNAL MEDICINE

## 2020-10-13 PROCEDURE — 90694 VACC AIIV4 NO PRSRV 0.5ML IM: CPT | Performed by: INTERNAL MEDICINE

## 2020-10-13 PROCEDURE — 96372 THER/PROPH/DIAG INJ SC/IM: CPT | Performed by: INTERNAL MEDICINE

## 2020-10-13 RX ADMIN — CYANOCOBALAMIN 1000 MCG: 1000 INJECTION, SOLUTION INTRAMUSCULAR; SUBCUTANEOUS at 13:41

## 2020-10-14 RX ORDER — PROMETHAZINE HYDROCHLORIDE 25 MG/1
25 TABLET ORAL EVERY 8 HOURS PRN
Qty: 30 TABLET | Refills: 1 | Status: SHIPPED | OUTPATIENT
Start: 2020-10-14 | End: 2021-10-21

## 2020-10-15 RX ORDER — BLOOD-GLUCOSE METER
1 KIT MISCELLANEOUS DAILY
Qty: 100 EACH | Refills: 3 | Status: SHIPPED | OUTPATIENT
Start: 2020-10-15 | End: 2021-01-04

## 2020-10-15 NOTE — TELEPHONE ENCOUNTER
Insurance is requesting that there is a new script  for test strips. Free style light test strips.diagosic code DXE11.9 call back number [phone]

## 2020-10-30 ENCOUNTER — TELEPHONE (OUTPATIENT)
Dept: INTERNAL MEDICINE | Facility: CLINIC | Age: 85
End: 2020-10-30

## 2020-10-30 NOTE — TELEPHONE ENCOUNTER
Patient has DEXA ordered by [facility] 7/15/20. She has canceled appointment twice and not rescheduled.   She has appointment with you on 1/22/21  Can order be canceled?

## 2020-11-11 ENCOUNTER — CLINICAL SUPPORT (OUTPATIENT)
Dept: INTERNAL MEDICINE | Facility: CLINIC | Age: 85
End: 2020-11-11

## 2020-11-11 DIAGNOSIS — E53.8 B12 DEFICIENCY: ICD-10-CM

## 2020-11-11 PROCEDURE — 96372 THER/PROPH/DIAG INJ SC/IM: CPT | Performed by: INTERNAL MEDICINE

## 2020-11-11 RX ADMIN — CYANOCOBALAMIN 1000 MCG: 1000 INJECTION, SOLUTION INTRAMUSCULAR; SUBCUTANEOUS at 13:03

## 2020-12-09 DIAGNOSIS — F41.9 ANXIETY: ICD-10-CM

## 2020-12-09 RX ORDER — ALPRAZOLAM 0.5 MG/1
0.5 TABLET ORAL 2 TIMES DAILY PRN
Qty: 40 TABLET | Refills: 2 | Status: SHIPPED | OUTPATIENT
Start: 2020-12-09 | End: 2021-04-09 | Stop reason: SDUPTHER

## 2020-12-09 NOTE — TELEPHONE ENCOUNTER
Caller: Tessa Adler    Relationship: Self    Best call back number: [phone]    Medication needed:   Requested Prescriptions     Pending Prescriptions Disp Refills   • ALPRAZolam (XANAX) 0.5 MG tablet 40 tablet 2     Sig: Take 1 tablet by mouth 2 (Two) Times a Day As Needed for Anxiety.       When do you need the refill by: TODAY    What details did the patient provide when requesting the medication: 1 DAY LEFT    Does the patient have less than a 3 day supply:  [x] Yes  [] No    What is the patient's preferred pharmacy: [facility] [address] [place] [address] [address] [place] [address] [phone] [facility] [phone]

## 2020-12-16 ENCOUNTER — APPOINTMENT (OUTPATIENT)
Dept: MAMMOGRAPHY | Facility: HOSPITAL | Age: 85
End: 2020-12-16

## 2020-12-21 ENCOUNTER — CLINICAL SUPPORT (OUTPATIENT)
Dept: INTERNAL MEDICINE | Facility: CLINIC | Age: 85
End: 2020-12-21

## 2020-12-21 PROCEDURE — 96372 THER/PROPH/DIAG INJ SC/IM: CPT | Performed by: INTERNAL MEDICINE

## 2020-12-21 RX ADMIN — CYANOCOBALAMIN 1000 MCG: 1000 INJECTION, SOLUTION INTRAMUSCULAR; SUBCUTANEOUS at 11:09

## 2021-01-04 RX ORDER — BLOOD-GLUCOSE METER
KIT MISCELLANEOUS
Qty: 50 EACH | Refills: 3 | Status: SHIPPED | OUTPATIENT
Start: 2021-01-04 | End: 2021-11-16

## 2021-01-13 ENCOUNTER — CLINICAL SUPPORT (OUTPATIENT)
Dept: INTERNAL MEDICINE | Facility: CLINIC | Age: 86
End: 2021-01-13

## 2021-01-13 DIAGNOSIS — E53.8 B12 DEFICIENCY: ICD-10-CM

## 2021-01-13 PROCEDURE — 96372 THER/PROPH/DIAG INJ SC/IM: CPT | Performed by: INTERNAL MEDICINE

## 2021-01-13 RX ADMIN — CYANOCOBALAMIN 1000 MCG: 1000 INJECTION, SOLUTION INTRAMUSCULAR; SUBCUTANEOUS at 11:51

## 2021-01-22 ENCOUNTER — OFFICE VISIT (OUTPATIENT)
Dept: INTERNAL MEDICINE | Facility: CLINIC | Age: 86
End: 2021-01-22

## 2021-01-22 VITALS
HEIGHT: 64 IN | DIASTOLIC BLOOD PRESSURE: 70 MMHG | BODY MASS INDEX: 24.92 KG/M2 | SYSTOLIC BLOOD PRESSURE: 142 MMHG | TEMPERATURE: 97.3 F | WEIGHT: 146 LBS | HEART RATE: 84 BPM

## 2021-01-22 DIAGNOSIS — E55.9 VITAMIN D DEFICIENCY: ICD-10-CM

## 2021-01-22 DIAGNOSIS — E11.42 DIABETIC POLYNEUROPATHY ASSOCIATED WITH TYPE 2 DIABETES MELLITUS (HCC): ICD-10-CM

## 2021-01-22 DIAGNOSIS — N18.31 STAGE 3A CHRONIC KIDNEY DISEASE (HCC): ICD-10-CM

## 2021-01-22 DIAGNOSIS — E78.2 MIXED HYPERLIPIDEMIA: ICD-10-CM

## 2021-01-22 DIAGNOSIS — I25.10 ASHD (ARTERIOSCLEROTIC HEART DISEASE): ICD-10-CM

## 2021-01-22 DIAGNOSIS — F41.9 ANXIETY: ICD-10-CM

## 2021-01-22 DIAGNOSIS — I10 BENIGN ESSENTIAL HYPERTENSION: ICD-10-CM

## 2021-01-22 DIAGNOSIS — E11.21 DIABETIC NEPHROPATHY ASSOCIATED WITH TYPE 2 DIABETES MELLITUS (HCC): Primary | ICD-10-CM

## 2021-01-22 PROCEDURE — 99214 OFFICE O/P EST MOD 30 MIN: CPT | Performed by: INTERNAL MEDICINE

## 2021-01-22 RX ORDER — HYDROXYZINE HYDROCHLORIDE 25 MG/1
25 TABLET, FILM COATED ORAL 3 TIMES DAILY PRN
Qty: 90 TABLET | Refills: 3 | Status: SHIPPED | OUTPATIENT
Start: 2021-01-22 | End: 2021-11-23

## 2021-01-22 NOTE — PROGRESS NOTES
"[facility]     Tessa Adler  1935   [id number]      Patient Care Team:  Kishore Grande MD as PCP - General  Kishore Grande MD as PCP - Family Medicine    Chief Complaint::   Chief Complaint   Patient presents with   • Hyperlipidemia   • Hypertension   • Diabetes        HPI  Mrs. Adler comes in for follow-up of her diabetes, chronic kidney disease, hypertension, coronary artery disease, vitamin D deficiency and anxiety she had lab work done at  in December and saw her cardiologist there last week.  She has had no further complications since her myocardial infarction in the fall.  Her only complaint is inability to relax.  Her children tell her that \"she just needs to relax.\"  Her  tells her that she tries to do too much.  She does some household chores every morning and then in the afternoon tries to sit down but is only able to sit for 5 minutes until she find something else to do.  She takes hydroxyzine and alprazolam at night to sleep.  She admits that she feels restless.  She complains of numbness in her feet.  There is no fever, cough, shortness of breath or chest pain.    Chronic Conditions:      Patient Active Problem List   Diagnosis   • B12 deficiency   • Mixed hyperlipidemia   • Diabetes mellitus without complication (CMS/[facility])   • GERD without esophagitis   • Benign essential hypertension   • ASHD (arteriosclerotic heart disease)   • Vitamin D deficiency   • CKD (chronic kidney disease), stage III (CMS/[facility])   • Annual physical exam   • BMI 25.0-25.9,adult   • Diabetic nephropathy associated with type 2 diabetes mellitus (CMS/[facility])   • Hyperlipidemia due to type 2 diabetes mellitus (CMS/[facility])   • Idiopathic osteoarthritis   • Insomnia   • Irritable bowel syndrome   • Medicare annual wellness visit, subsequent   • Osteoarthritis   • Postmenopausal osteoporosis   • Trigger finger of both hands   • Diabetic polyneuropathy associated with type 2 diabetes " mellitus (CMS/HCC)        Past Medical History:   Diagnosis Date   • ASHD (arteriosclerotic heart disease)    • B12 deficiency    • Rader's esophagus    • Benign essential hypertension    • CKD (chronic kidney disease), stage III (CMS/HCC)    • Diabetes mellitus without complication (CMS/HCC)    • GERD without esophagitis    • Hemorrhoids    • History of colonic polyps    • Mixed hyperlipidemia    • Myocardial infarction (CMS/HCC)     NONSTEMI   • Stress-induced cardiomyopathy 02/27/2015   • Vitamin D deficiency        Past Surgical History:   Procedure Laterality Date   • CARPAL TUNNEL RELEASE  1989   • CATARACT EXTRACTION     • CHOLECYSTECTOMY  2001   • CORONARY ANGIOPLASTY WITH STENT PLACEMENT  2010    PTCA, stents, LAD   • HEMORRHOIDECTOMY  1965   • HYSTERECTOMY  1968    AGE 33   • OOPHORECTOMY Bilateral     AGE 33       Family History   Problem Relation Age of Onset   • Breast cancer Neg Hx    • Ovarian cancer Neg Hx        Social History     Socioeconomic History   • Marital status:      Spouse name: Not on file   • Number of children: Not on file   • Years of education: Not on file   • Highest education level: Not on file   Tobacco Use   • Smoking status: Never Smoker   • Smokeless tobacco: Never Used   Substance and Sexual Activity   • Alcohol use: No     Frequency: Never   • Drug use: No   • Sexual activity: Defer       Allergies   Allergen Reactions   • Clarithromycin Unknown (See Comments)     Unknown  Biaxin   • Codeine Unknown (See Comments)     unknown   • Dobutamine Unknown (See Comments)     unknown   • Iodinated Diagnostic Agents Unknown (See Comments)     unknown   • Ioversol Swelling     Tongue swelling  Contrast Dye    • Pantoprazole Sodium Unknown (See Comments)     Unknown  Protonix    • Perflutren Lipid Microsphere Unknown (See Comments)     Unknown  Definity    • Sulfa Antibiotics Unknown (See Comments)     Unknown  Bactrim   • Trimethoprim Unknown (See Comments)     Unknown  Bactrim          Current Outpatient Medications:   •  ALPRAZolam (XANAX) 0.5 MG tablet, Take 1 tablet by mouth 2 (Two) Times a Day As Needed for Anxiety., Disp: 40 tablet, Rfl: 2  •  aspirin 81 MG EC tablet, Take 81 mg by mouth Daily. Take one daily, Disp: , Rfl:   •  atorvastatin (LIPITOR) 10 MG tablet, Take 1 tablet by mouth Daily., Disp: 90 tablet, Rfl: 3  •  carvedilol (COREG) 6.25 MG tablet, Take 6.25 mg by mouth 2 (Two) Times a Day With Meals., Disp: , Rfl:   •  Cholecalciferol (VITAMIN D3) 2000 units tablet, Take  by mouth. Take one daily, Disp: , Rfl:   •  Cyanocobalamin 1000 MCG/ML kit, Inject  as directed. Take injection once a month, Disp: , Rfl:   •  FREESTYLE LITE test strip, USE 1 STRIP TO CHECK GLUCOSE ONCE DAILY AS  DIRECTED, Disp: 50 each, Rfl: 3  •  hydrOXYzine (ATARAX) 25 MG tablet, Take 1 tablet by mouth 3 (Three) Times a Day As Needed for Itching., Disp: 90 tablet, Rfl: 3  •  Lancets (FREESTYLE) lancets, Use once daily as instructed for blood sugar testing, Disp: 100 each, Rfl: 3  •  lisinopril (PRINIVIL,ZESTRIL) 2.5 MG tablet, Take 4 tablets by mouth Daily. (Patient taking differently: Take 10 mg by mouth Daily. Pt takes 10mg qd), Disp: , Rfl:   •  metFORMIN (GLUCOPHAGE) 1000 MG tablet, TAKE 1 TABLET BY MOUTH TWICE DAILY WITH MEALS, Disp: 60 tablet, Rfl: 5  •  Multiple Vitamins-Minerals (ICAPS AREDS 2 PO), Take 1 capsule by mouth Daily., Disp: , Rfl:   •  nitroglycerin (NITROSTAT) 0.4 MG SL tablet, Place 1 tablet under the tongue As Needed for Chest Pain. Take no more than 3 doses in 15 minutes., Disp: 25 tablet, Rfl: 5  •  polyethylene glycol (MiraLax) 17 GM/SCOOP powder, Take 17 g by mouth Daily As Needed., Disp: , Rfl:   •  promethazine (PHENERGAN) 25 MG tablet, Take 1 tablet by mouth Every 8 (Eight) Hours As Needed for Nausea or Vomiting. Take as needed for nausea and vomiting, Disp: 30 tablet, Rfl: 1  •  ticagrelor (Brilinta) 90 MG tablet tablet, Take 90 mg by mouth 2 (Two) Times a Day., Disp: ,  "Rfl:     Current Facility-Administered Medications:   •  cyanocobalamin injection 1,000 mcg, 1,000 mcg, Intramuscular, Q28 Days, Kishore Grande MD, 1,000 mcg at 01/13/21 1151    Review of Systems   Constitutional: Negative for chills, fatigue and fever.   HENT: Positive for ear pain. Negative for congestion and sinus pressure.    Respiratory: Negative for cough, chest tightness, shortness of breath and wheezing.    Cardiovascular: Negative for chest pain and palpitations.   Gastrointestinal: Negative for abdominal pain, blood in stool and constipation.   Skin: Negative for color change.   Allergic/Immunologic: Negative for environmental allergies.   Neurological: Negative for dizziness, speech difficulty and headache.   Psychiatric/Behavioral: Negative for decreased concentration. The patient is nervous/anxious.         Vital Signs  Vitals:    01/22/21 0814   BP: 142/70   BP Location: Right arm   Patient Position: Sitting   Cuff Size: Adult   Pulse: 84   Temp: 97.3 °F (36.3 °C)   Weight: 66.2 kg (146 lb)   Height: 162.6 cm (64.02\")   PainSc: 0-No pain       Physical Exam  Vitals signs reviewed.   Constitutional:       Appearance: She is well-developed.   HENT:      Head: Normocephalic and atraumatic.   Cardiovascular:      Rate and Rhythm: Normal rate and regular rhythm.      Pulses:           Dorsalis pedis pulses are 1+ on the right side and 1+ on the left side.      Heart sounds: Normal heart sounds. No murmur.   Pulmonary:      Effort: Pulmonary effort is normal.      Breath sounds: Normal breath sounds.   Musculoskeletal:      Right foot: No deformity.      Left foot: No deformity.   Feet:      Right foot:      Protective Sensation: 5 sites tested. 5 sites sensed.      Skin integrity: Skin integrity normal.      Left foot:      Protective Sensation: 5 sites tested. 5 sites sensed.      Skin integrity: Skin integrity normal.      Comments: Sensation in both feet reduced to monofilament.     Diabetic Foot " Exam Performed and Monofilament Test Performed    Neurological:      Mental Status: She is alert and oriented to person, place, and time.          Procedures    ACE III MINI             Assessment/Plan:    Diagnoses and all orders for this visit:    1. Diabetic nephropathy associated with type 2 diabetes mellitus (CMS/HCC) (Primary)    2. Stage 3a chronic kidney disease (CMS/[facility])    3. Diabetic polyneuropathy associated with type 2 diabetes mellitus (CMS/HCC)    4. Benign essential hypertension    5. Mixed hyperlipidemia    6. ASHD (arteriosclerotic heart disease)    7. Vitamin D deficiency    8. Anxiety    Other orders  -     hydrOXYzine (ATARAX) 25 MG tablet; Take 1 tablet by mouth 3 (Three) Times a Day As Needed for Itching.  Dispense: 90 tablet; Refill: 3    Plan    A1c is well controlled at 7.0.  She will continue Metformin and healthy diet.  We discussed the significance of neuropathy.    Plan to repeat GFR on return.  The treatment remains control of glucose and blood pressure and avoidance of NSAIDs.    Blood pressure is adequately controlled on carvedilol and lisinopril.    Lipids are well controlled on atorvastatin except for triglycerides which remain elevated at about 380.  6 months ago however triglycerides were over 400.  She will continue working on healthy diet.  Apparently her cardiologist discussed adding triglyceride lowering medication but the pharmacist there argued against it.    Coronary artery disease is asymptomatic on aspirin, atorvastatin, carvedilol, lisinopril, and Brilinta.    We discussed anxiety and its treatment.  She will continue taking alprazolam only at bedtime to help her sleep.  I have suggested that she try taking hydroxyzine during the day when she is ready to sit down and relax to see if that helps.      Plan of care reviewed with patient at the conclusion of today's visit. Education was provided regarding diagnosis, management, and any prescribed or recommended OTC  medications.Patient verbalizes understanding of and agreement with management plan.         Kishore Grande MD

## 2021-03-10 ENCOUNTER — CLINICAL SUPPORT (OUTPATIENT)
Dept: INTERNAL MEDICINE | Facility: CLINIC | Age: 86
End: 2021-03-10

## 2021-03-10 DIAGNOSIS — E53.8 B12 DEFICIENCY: ICD-10-CM

## 2021-03-10 PROCEDURE — 96372 THER/PROPH/DIAG INJ SC/IM: CPT | Performed by: INTERNAL MEDICINE

## 2021-03-10 RX ADMIN — CYANOCOBALAMIN 1000 MCG: 1000 INJECTION, SOLUTION INTRAMUSCULAR; SUBCUTANEOUS at 11:15

## 2021-04-09 DIAGNOSIS — F41.9 ANXIETY: ICD-10-CM

## 2021-04-09 RX ORDER — ALPRAZOLAM 0.5 MG/1
0.5 TABLET ORAL 2 TIMES DAILY PRN
Qty: 40 TABLET | Refills: 2 | Status: SHIPPED | OUTPATIENT
Start: 2021-04-09 | End: 2021-08-02 | Stop reason: SDUPTHER

## 2021-04-09 NOTE — TELEPHONE ENCOUNTER
Caller: Tessa Adler    Relationship: Self    Best call back number: [phone]    Medication needed:   Requested Prescriptions     Pending Prescriptions Disp Refills   • ALPRAZolam (XANAX) 0.5 MG tablet 40 tablet 2     Sig: Take 1 tablet by mouth 2 (Two) Times a Day As Needed for Anxiety.       When do you need the refill by: 04/09/2021    What additional details did the patient provide when requesting the medication: PATIENT HAS 2 PILLS LEFT    Does the patient have less than a 3 day supply:  [x] Yes  [] No    What is the patient's preferred pharmacy:    [facility] ON [address]

## 2021-04-16 ENCOUNTER — CLINICAL SUPPORT (OUTPATIENT)
Dept: INTERNAL MEDICINE | Facility: CLINIC | Age: 86
End: 2021-04-16

## 2021-04-16 DIAGNOSIS — E53.8 B12 DEFICIENCY: ICD-10-CM

## 2021-04-16 PROCEDURE — 96372 THER/PROPH/DIAG INJ SC/IM: CPT | Performed by: INTERNAL MEDICINE

## 2021-04-16 RX ADMIN — CYANOCOBALAMIN 1000 MCG: 1000 INJECTION, SOLUTION INTRAMUSCULAR; SUBCUTANEOUS at 13:35

## 2021-05-18 ENCOUNTER — CLINICAL SUPPORT (OUTPATIENT)
Dept: INTERNAL MEDICINE | Facility: CLINIC | Age: 86
End: 2021-05-18

## 2021-05-18 DIAGNOSIS — E53.8 B12 DEFICIENCY: ICD-10-CM

## 2021-05-18 PROCEDURE — 96372 THER/PROPH/DIAG INJ SC/IM: CPT | Performed by: INTERNAL MEDICINE

## 2021-05-18 RX ORDER — LANCETS 28 GAUGE
EACH MISCELLANEOUS
Qty: 100 EACH | Refills: 3 | Status: SHIPPED | OUTPATIENT
Start: 2021-05-18 | End: 2023-02-17 | Stop reason: SDUPTHER

## 2021-05-18 RX ADMIN — CYANOCOBALAMIN 1000 MCG: 1000 INJECTION, SOLUTION INTRAMUSCULAR; SUBCUTANEOUS at 13:06

## 2021-05-18 NOTE — TELEPHONE ENCOUNTER
Caller: Tessa Adler    Relationship: Self    Best call back number: [phone]    Medication needed:   Requested Prescriptions     Pending Prescriptions Disp Refills   • metFORMIN (GLUCOPHAGE) 1000 MG tablet 60 tablet 5     Sig: Take 1 tablet by mouth 2 (Two) Times a Day With Meals.   • Lancets (freestyle) lancets 100 each 3     Sig: Use once daily as instructed for blood sugar testing       When do you need the refill by: 05/18/2021    What additional details did the patient provide when requesting the medication: PATIENT ONLY HAS TWO DAY SUPPLY LEFT.    Does the patient have less than a 3 day supply:  [x] Yes  [] No    What is the patient's preferred pharmacy: [facility] [address] [place] [address] [phone] [facility] [phone] FX

## 2021-06-23 ENCOUNTER — CLINICAL SUPPORT (OUTPATIENT)
Dept: INTERNAL MEDICINE | Facility: CLINIC | Age: 86
End: 2021-06-23

## 2021-06-23 DIAGNOSIS — E53.8 B12 DEFICIENCY: ICD-10-CM

## 2021-06-23 PROCEDURE — 96372 THER/PROPH/DIAG INJ SC/IM: CPT | Performed by: INTERNAL MEDICINE

## 2021-06-23 RX ADMIN — CYANOCOBALAMIN 1000 MCG: 1000 INJECTION, SOLUTION INTRAMUSCULAR; SUBCUTANEOUS at 11:37

## 2021-07-19 ENCOUNTER — CLINICAL SUPPORT (OUTPATIENT)
Dept: INTERNAL MEDICINE | Facility: CLINIC | Age: 86
End: 2021-07-19

## 2021-07-19 DIAGNOSIS — E53.8 B12 DEFICIENCY: ICD-10-CM

## 2021-07-19 PROCEDURE — 96372 THER/PROPH/DIAG INJ SC/IM: CPT | Performed by: INTERNAL MEDICINE

## 2021-07-19 RX ADMIN — CYANOCOBALAMIN 1000 MCG: 1000 INJECTION, SOLUTION INTRAMUSCULAR; SUBCUTANEOUS at 11:01

## 2021-08-02 ENCOUNTER — OFFICE VISIT (OUTPATIENT)
Dept: INTERNAL MEDICINE | Facility: CLINIC | Age: 86
End: 2021-08-02

## 2021-08-02 ENCOUNTER — LAB (OUTPATIENT)
Dept: LAB | Facility: HOSPITAL | Age: 86
End: 2021-08-02

## 2021-08-02 VITALS
HEIGHT: 63 IN | TEMPERATURE: 98.2 F | HEART RATE: 80 BPM | SYSTOLIC BLOOD PRESSURE: 122 MMHG | WEIGHT: 144.8 LBS | DIASTOLIC BLOOD PRESSURE: 80 MMHG | BODY MASS INDEX: 25.66 KG/M2

## 2021-08-02 DIAGNOSIS — E53.8 B12 DEFICIENCY: ICD-10-CM

## 2021-08-02 DIAGNOSIS — E78.2 MIXED HYPERLIPIDEMIA: ICD-10-CM

## 2021-08-02 DIAGNOSIS — M43.16 SPONDYLOLISTHESIS AT L4-L5 LEVEL: ICD-10-CM

## 2021-08-02 DIAGNOSIS — I25.10 ASHD (ARTERIOSCLEROTIC HEART DISEASE): ICD-10-CM

## 2021-08-02 DIAGNOSIS — I10 BENIGN ESSENTIAL HYPERTENSION: ICD-10-CM

## 2021-08-02 DIAGNOSIS — N18.31 STAGE 3A CHRONIC KIDNEY DISEASE (HCC): ICD-10-CM

## 2021-08-02 DIAGNOSIS — E11.21 DIABETIC NEPHROPATHY ASSOCIATED WITH TYPE 2 DIABETES MELLITUS (HCC): ICD-10-CM

## 2021-08-02 DIAGNOSIS — E55.9 VITAMIN D DEFICIENCY: ICD-10-CM

## 2021-08-02 DIAGNOSIS — E11.42 DIABETIC POLYNEUROPATHY ASSOCIATED WITH TYPE 2 DIABETES MELLITUS (HCC): ICD-10-CM

## 2021-08-02 DIAGNOSIS — Z00.00 MEDICARE ANNUAL WELLNESS VISIT, SUBSEQUENT: Primary | ICD-10-CM

## 2021-08-02 DIAGNOSIS — F41.9 ANXIETY: ICD-10-CM

## 2021-08-02 DIAGNOSIS — M81.0 POSTMENOPAUSAL OSTEOPOROSIS: ICD-10-CM

## 2021-08-02 LAB
25(OH)D3 SERPL-MCNC: 33.1 NG/ML
ALBUMIN SERPL-MCNC: 4.3 G/DL (ref 3.5–5.2)
ALBUMIN/GLOB SERPL: 1.3 G/DL
ALP SERPL-CCNC: 73 U/L (ref 39–117)
ALT SERPL W P-5'-P-CCNC: 7 U/L (ref 1–33)
ANION GAP SERPL CALCULATED.3IONS-SCNC: 12 MMOL/L (ref 5–15)
AST SERPL-CCNC: 10 U/L (ref 1–32)
BASOPHILS # BLD AUTO: 0.08 10*3/MM3 (ref 0–0.2)
BASOPHILS NFR BLD AUTO: 0.9 % (ref 0–1.5)
BILIRUB SERPL-MCNC: 0.2 MG/DL (ref 0–1.2)
BUN SERPL-MCNC: 20 MG/DL (ref 8–23)
BUN/CREAT SERPL: 20 (ref 7–25)
CALCIUM SPEC-SCNC: 9.5 MG/DL (ref 8.6–10.5)
CHLORIDE SERPL-SCNC: 104 MMOL/L (ref 98–107)
CHOLEST SERPL-MCNC: 178 MG/DL (ref 0–200)
CO2 SERPL-SCNC: 21 MMOL/L (ref 22–29)
CREAT SERPL-MCNC: 1 MG/DL (ref 0.57–1)
DEPRECATED RDW RBC AUTO: 44.1 FL (ref 37–54)
EOSINOPHIL # BLD AUTO: 0.33 10*3/MM3 (ref 0–0.4)
EOSINOPHIL NFR BLD AUTO: 3.9 % (ref 0.3–6.2)
ERYTHROCYTE [DISTWIDTH] IN BLOOD BY AUTOMATED COUNT: 13.5 % (ref 12.3–15.4)
GFR SERPL CREATININE-BSD FRML MDRD: 53 ML/MIN/1.73
GLOBULIN UR ELPH-MCNC: 3.4 GM/DL
GLUCOSE SERPL-MCNC: 128 MG/DL (ref 65–99)
HBA1C MFR BLD: 7.19 % (ref 4.8–5.6)
HCT VFR BLD AUTO: 36.9 % (ref 34–46.6)
HDLC SERPL-MCNC: 40 MG/DL (ref 40–60)
HGB BLD-MCNC: 11.9 G/DL (ref 12–15.9)
IMM GRANULOCYTES # BLD AUTO: 0.06 10*3/MM3 (ref 0–0.05)
IMM GRANULOCYTES NFR BLD AUTO: 0.7 % (ref 0–0.5)
LDLC SERPL CALC-MCNC: 69 MG/DL (ref 0–100)
LDLC/HDLC SERPL: 1.25 {RATIO}
LYMPHOCYTES # BLD AUTO: 2.61 10*3/MM3 (ref 0.7–3.1)
LYMPHOCYTES NFR BLD AUTO: 30.5 % (ref 19.6–45.3)
MCH RBC QN AUTO: 28.8 PG (ref 26.6–33)
MCHC RBC AUTO-ENTMCNC: 32.2 G/DL (ref 31.5–35.7)
MCV RBC AUTO: 89.3 FL (ref 79–97)
MONOCYTES # BLD AUTO: 0.54 10*3/MM3 (ref 0.1–0.9)
MONOCYTES NFR BLD AUTO: 6.3 % (ref 5–12)
NEUTROPHILS NFR BLD AUTO: 4.93 10*3/MM3 (ref 1.7–7)
NEUTROPHILS NFR BLD AUTO: 57.7 % (ref 42.7–76)
NRBC BLD AUTO-RTO: 0 /100 WBC (ref 0–0.2)
PLATELET # BLD AUTO: 326 10*3/MM3 (ref 140–450)
PMV BLD AUTO: 10.2 FL (ref 6–12)
POTASSIUM SERPL-SCNC: 4.6 MMOL/L (ref 3.5–5.2)
PROT SERPL-MCNC: 7.7 G/DL (ref 6–8.5)
RBC # BLD AUTO: 4.13 10*6/MM3 (ref 3.77–5.28)
SODIUM SERPL-SCNC: 137 MMOL/L (ref 136–145)
TRIGL SERPL-MCNC: 440 MG/DL (ref 0–150)
VIT B12 BLD-MCNC: 538 PG/ML (ref 211–946)
VLDLC SERPL-MCNC: 69 MG/DL (ref 5–40)
WBC # BLD AUTO: 8.55 10*3/MM3 (ref 3.4–10.8)

## 2021-08-02 PROCEDURE — 82306 VITAMIN D 25 HYDROXY: CPT

## 2021-08-02 PROCEDURE — 80061 LIPID PANEL: CPT

## 2021-08-02 PROCEDURE — 82570 ASSAY OF URINE CREATININE: CPT

## 2021-08-02 PROCEDURE — 80053 COMPREHEN METABOLIC PANEL: CPT

## 2021-08-02 PROCEDURE — G0439 PPPS, SUBSEQ VISIT: HCPCS | Performed by: INTERNAL MEDICINE

## 2021-08-02 PROCEDURE — 82607 VITAMIN B-12: CPT

## 2021-08-02 PROCEDURE — 82043 UR ALBUMIN QUANTITATIVE: CPT

## 2021-08-02 PROCEDURE — 85025 COMPLETE CBC W/AUTO DIFF WBC: CPT

## 2021-08-02 PROCEDURE — 83036 HEMOGLOBIN GLYCOSYLATED A1C: CPT

## 2021-08-02 RX ORDER — ALPRAZOLAM 0.5 MG/1
0.5 TABLET ORAL 2 TIMES DAILY PRN
Qty: 40 TABLET | Refills: 2 | Status: SHIPPED | OUTPATIENT
Start: 2021-08-02 | End: 2021-11-23

## 2021-08-02 RX ORDER — LISINOPRIL 10 MG/1
10 TABLET ORAL DAILY
COMMUNITY
End: 2022-08-10 | Stop reason: SDUPTHER

## 2021-08-02 NOTE — PROGRESS NOTES
QUICK REFERENCE INFORMATION:  The ABCs of the Annual Wellness Visit    Subsequent Medicare Wellness Visit    HEALTH RISK ASSESSMENT    1935    Recent Hospitalizations:  No hospitalization(s) within the last year..        Current Medical Providers:  Patient Care Team:  Kishore Grande MD as PCP - General  Kishore Grande MD as PCP - Family Medicine        Smoking Status:  Social History     Tobacco Use   Smoking Status Never Smoker   Smokeless Tobacco Never Used       Alcohol Consumption:  Social History     Substance and Sexual Activity   Alcohol Use No       Depression Screen:   PHQ-2/PHQ-9 Depression Screening 8/2/2021   Little interest or pleasure in doing things 0   Feeling down, depressed, or hopeless 0   Total Score 0       Health Habits and Functional and Cognitive Screening:  Functional & Cognitive Status 8/2/2021   Do you have difficulty preparing food and eating? No   Do you have difficulty bathing yourself, getting dressed or grooming yourself? No   Do you have difficulty using the toilet? No   Do you have difficulty moving around from place to place? No   Do you have trouble with steps or getting out of a bed or a chair? No   Current Diet Unhealthy Diet   Dental Exam Up to date   Eye Exam Up to date   Exercise (times per week) 0 times per week   Current Exercises Include Walking        Exercise Comment shopping, cleaning house   Current Exercise Activities Include -   Do you need help using the phone?  No   Are you deaf or do you have serious difficulty hearing?  No   Do you need help with transportation? No   Do you need help shopping? No   Do you need help preparing meals?  No   Do you need help with housework?  No   Do you need help with laundry? No   Do you need help taking your medications? No   Do you need help managing money? No   Do you ever drive or ride in a car without wearing a seat belt? No   Have you felt unusual stress, anger or loneliness in the last month? No   Who do  you live with? Spouse   If you need help, do you have trouble finding someone available to you? No   Have you been bothered in the last four weeks by sexual problems? No   Do you have difficulty concentrating, remembering or making decisions? No       Fall Risk Screen:  LOREE Fall Risk Assessment was completed, and patient is at LOW risk for falls.Assessment completed on:8/2/2021    ACE III MINI        Does the patient have evidence of cognitive impairment? No    Aspirin use counseling: Taking ASA appropriately as indicated    Recent Lab Results:  CMP:  Lab Results   Component Value Date    BUN 15 07/15/2020    CREATININE 0.91 07/15/2020    EGFRIFNONA 59 (L) 07/15/2020    BCR 16.5 07/15/2020     07/15/2020    K 4.4 07/15/2020    CO2 24.7 07/15/2020    CALCIUM 9.9 07/15/2020    ALBUMIN 4.30 07/15/2020    BILITOT 0.3 07/15/2020    ALKPHOS 59 07/15/2020    AST 14 07/15/2020    ALT 12 07/15/2020     HbA1c:  Lab Results   Component Value Date    HGBA1C 6.80 (H) 07/15/2020    HGBA1C 6.90 (H) 01/09/2020     Microalbumin:  Lab Results   Component Value Date    MICROALBUR <1.2 07/15/2020     Lipid Panel  Lab Results   Component Value Date    CHOL 203 (H) 07/15/2020    TRIG 431 (H) 07/15/2020    HDL 42 07/15/2020    LDL  07/15/2020      Comment:      Unable to calculate     (H) 07/15/2020    AST 14 07/15/2020    ALT 12 07/15/2020       Visual Acuity:  No exam data present    Age-appropriate Screening Schedule:  Refer to the list below for future screening recommendations based on patient's age, sex and/or medical conditions. Orders for these recommended tests are listed in the plan section. The patient has been provided with a written plan.    Health Maintenance   Topic Date Due   • ZOSTER VACCINE (1 of 2) Never done   • DXA SCAN  08/01/2018   • HEMOGLOBIN A1C  06/08/2021   • URINE MICROALBUMIN  07/15/2021   • INFLUENZA VACCINE  10/01/2021   • DIABETIC EYE EXAM  10/06/2021   • LIPID PANEL  12/08/2021   • TDAP/TD  VACCINES (2 - Td or Tdap) 11/08/2027        Subjective   History of Present Illness    Tessa Adler is a 86 y.o. female who presents for a Subsequent Wellness Visit.    CHRONIC CONDITIONS    The following portions of the patient's history were reviewed and updated as appropriate: allergies, current medications, past family history, past medical history, past social history, past surgical history and problem list.    Outpatient Medications Prior to Visit   Medication Sig Dispense Refill   • aspirin 81 MG EC tablet Take 81 mg by mouth Daily. Take one daily     • atorvastatin (LIPITOR) 10 MG tablet Take 1 tablet by mouth Daily. 90 tablet 3   • carvedilol (COREG) 6.25 MG tablet Take 6.25 mg by mouth 2 (Two) Times a Day With Meals.     • Cholecalciferol (VITAMIN D3) 2000 units tablet Take  by mouth. Take one daily     • Cyanocobalamin 1000 MCG/ML kit Inject  as directed. Take injection once a month     • FREESTYLE LITE test strip USE 1 STRIP TO CHECK GLUCOSE ONCE DAILY AS  DIRECTED 50 each 3   • hydrOXYzine (ATARAX) 25 MG tablet Take 1 tablet by mouth 3 (Three) Times a Day As Needed for Itching. 90 tablet 3   • Lancets (freestyle) lancets Use once daily as instructed for blood sugar testing 100 each 3   • lisinopril (PRINIVIL,ZESTRIL) 10 MG tablet Take 10 mg by mouth Daily.     • metFORMIN (GLUCOPHAGE) 1000 MG tablet Take 1 tablet by mouth 2 (Two) Times a Day With Meals. 180 tablet 1   • Multiple Vitamins-Minerals (ICAPS AREDS 2 PO) Take 1 capsule by mouth Daily.     • nitroglycerin (NITROSTAT) 0.4 MG SL tablet Place 1 tablet under the tongue As Needed for Chest Pain. Take no more than 3 doses in 15 minutes. 25 tablet 5   • promethazine (PHENERGAN) 25 MG tablet Take 1 tablet by mouth Every 8 (Eight) Hours As Needed for Nausea or Vomiting. Take as needed for nausea and vomiting 30 tablet 1   • ticagrelor (Brilinta) 90 MG tablet tablet Take 90 mg by mouth 2 (Two) Times a Day.     • ALPRAZolam (XANAX) 0.5 MG tablet  Take 1 tablet by mouth 2 (Two) Times a Day As Needed for Anxiety. 40 tablet 2   • lisinopril (PRINIVIL,ZESTRIL) 2.5 MG tablet Take 4 tablets by mouth Daily. (Patient taking differently: Take 10 mg by mouth Daily. Pt takes 10mg qd)     • polyethylene glycol (MiraLax) 17 GM/SCOOP powder Take 17 g by mouth Daily As Needed.       Facility-Administered Medications Prior to Visit   Medication Dose Route Frequency Provider Last Rate Last Admin   • cyanocobalamin injection 1,000 mcg  1,000 mcg Intramuscular Q28 Days Kishore Grande MD   1,000 mcg at 07/19/21 1101       Patient Active Problem List   Diagnosis   • B12 deficiency   • Mixed hyperlipidemia   • Diabetes mellitus without complication (CMS/[facility])   • GERD without esophagitis   • Benign essential hypertension   • ASHD (arteriosclerotic heart disease)   • Vitamin D deficiency   • CKD (chronic kidney disease), stage III (CMS/[facility])   • Annual physical exam   • BMI 25.0-25.9,adult   • Diabetic nephropathy associated with type 2 diabetes mellitus (CMS/[facility])   • Hyperlipidemia due to type 2 diabetes mellitus (CMS/[facility])   • Idiopathic osteoarthritis   • Insomnia   • Irritable bowel syndrome   • Medicare annual wellness visit, subsequent   • Osteoarthritis   • Postmenopausal osteoporosis   • Trigger finger of both hands   • Diabetic polyneuropathy associated with type 2 diabetes mellitus (CMS/[facility])   • Spondylolisthesis at L4-L5 level       Advance Care Planning:  ACP discussion was held with the patient during this visit. Patient does not have an advance directive, information provided.    Identification of Risk Factors:  Risk factors include: Advance Directive Discussion  Chronic Pain .    Review of Systems   Constitutional: Negative for chills, fatigue and fever.   HENT: Negative for congestion, ear pain and sinus pressure.    Respiratory: Negative for cough, chest tightness, shortness of breath and wheezing.    Cardiovascular: Negative for chest pain and palpitations.    Gastrointestinal: Negative for abdominal pain, blood in stool and constipation.   Musculoskeletal: Positive for back pain.   Skin: Negative for color change.   Allergic/Immunologic: Negative for environmental allergies.   Neurological: Positive for numbness. Negative for dizziness, speech difficulty and headaches.   Psychiatric/Behavioral: Negative for confusion. The patient is not nervous/anxious.        Compared to one year ago, the patient feels her physical health is the same.  Compared to one year ago, the patient feels her mental health is the same.    Objective     Physical Exam  Vitals and nursing note reviewed.   Constitutional:       Appearance: She is well-developed.   HENT:      Head: Normocephalic and atraumatic.      Right Ear: External ear normal.      Left Ear: External ear normal.      Nose: Nose normal.      Mouth/Throat:      Pharynx: No oropharyngeal exudate.   Eyes:      Conjunctiva/sclera: Conjunctivae normal.      Pupils: Pupils are equal, round, and reactive to light.   Neck:      Thyroid: No thyromegaly.      Vascular: No JVD.   Cardiovascular:      Rate and Rhythm: Normal rate and regular rhythm.      Pulses:           Dorsalis pedis pulses are 1+ on the right side and 1+ on the left side.      Heart sounds: Normal heart sounds. No murmur heard.   No friction rub. No gallop.    Pulmonary:      Effort: Pulmonary effort is normal. No respiratory distress.      Breath sounds: Normal breath sounds. No wheezing or rales.   Chest:      Chest wall: No tenderness.   Abdominal:      General: Bowel sounds are normal. There is no distension.      Palpations: Abdomen is soft. There is no mass.      Tenderness: There is no abdominal tenderness. There is no guarding or rebound.      Hernia: No hernia is present.   Musculoskeletal:         General: No tenderness. Normal range of motion.      Cervical back: Normal range of motion and neck supple.      Right foot: No deformity.      Left foot: No  "deformity.   Feet:      Right foot:      Protective Sensation: 5 sites tested. 5 sites sensed.      Skin integrity: Skin integrity normal.      Left foot:      Protective Sensation: 5 sites tested. 5 sites sensed.      Skin integrity: Skin integrity normal.      Comments: Sensation in both feet absent to monofilament.     Diabetic Foot Exam Performed and Monofilament Test Performed    Lymphadenopathy:      Cervical: No cervical adenopathy.   Skin:     General: Skin is warm and dry.      Findings: No erythema or rash.   Neurological:      Mental Status: She is alert and oriented to person, place, and time.      Cranial Nerves: No cranial nerve deficit.      Sensory: No sensory deficit.      Motor: No abnormal muscle tone.      Coordination: Coordination normal.      Deep Tendon Reflexes: Reflexes normal.   Psychiatric:         Behavior: Behavior normal.         Thought Content: Thought content normal.         Judgment: Judgment normal.          Procedures     Vitals:    08/02/21 0912   BP: 122/80   BP Location: Left arm   Patient Position: Sitting   Cuff Size: Adult   Pulse: 80   Temp: 98.2 °F (36.8 °C)   Weight: 65.7 kg (144 lb 12.8 oz)   Height: 160 cm (63\")   PainSc:   7   PainLoc: Back  Comment: also knees       Patient's Body mass index is 25.65 kg/m². indicating that she is overweight (BMI 25-29.9). Obesity-related health conditions include the following: hypertension, coronary heart disease, diabetes mellitus and osteoarthritis. Obesity is unchanged. BMI is is above average; BMI management plan is completed. We discussed portion control, increasing exercise and joining a fitness center or start home based exercise program..      Assessment/Plan   Problem List Items Addressed This Visit        Cardiac and Vasculature    Mixed hyperlipidemia    Relevant Medications    atorvastatin (LIPITOR) 10 MG tablet    Other Relevant Orders    Comprehensive Metabolic Panel    Lipid Panel    Benign essential hypertension    " Relevant Medications    carvedilol (COREG) 6.25 MG tablet    lisinopril (PRINIVIL,ZESTRIL) 10 MG tablet    Other Relevant Orders    CBC & Differential       Endocrine and Metabolic    B12 deficiency    Relevant Medications    cyanocobalamin injection 1,000 mcg    Other Relevant Orders    Vitamin B12    Vitamin D deficiency    Relevant Orders    Vitamin D 25 Hydroxy       Genitourinary and Reproductive     CKD (chronic kidney disease), stage III (CMS/[facility])    Diabetic nephropathy associated with type 2 diabetes mellitus (CMS/[facility])    Relevant Medications    metFORMIN (GLUCOPHAGE) 1000 MG tablet    Other Relevant Orders    Hemoglobin A1c    Microalbumin / Creatinine Urine Ratio - Urine, Clean Catch       Health Encounters    Medicare annual wellness visit, subsequent - Primary       Musculoskeletal and Injuries    Postmenopausal osteoporosis    Spondylolisthesis at L4-L5 level       Neuro    Diabetic polyneuropathy associated with type 2 diabetes mellitus (CMS/[facility])    Relevant Medications    metFORMIN (GLUCOPHAGE) 1000 MG tablet      Other Visit Diagnoses     Anxiety        Relevant Medications    ALPRAZolam (XANAX) 0.5 MG tablet        Patient Self-Management and Personalized Health Advice  The patient has been provided with information about: diet, exercise and weight management and preventive services including:   · Annual Wellness Visit (AWV).    Outpatient Encounter Medications as of 8/2/2021   Medication Sig Dispense Refill   • ALPRAZolam (XANAX) 0.5 MG tablet Take 1 tablet by mouth 2 (Two) Times a Day As Needed for Anxiety. 40 tablet 2   • aspirin 81 MG EC tablet Take 81 mg by mouth Daily. Take one daily     • atorvastatin (LIPITOR) 10 MG tablet Take 1 tablet by mouth Daily. 90 tablet 3   • carvedilol (COREG) 6.25 MG tablet Take 6.25 mg by mouth 2 (Two) Times a Day With Meals.     • Cholecalciferol (VITAMIN D3) 2000 units tablet Take  by mouth. Take one daily     • Cyanocobalamin 1000 MCG/ML kit Inject  as  directed. Take injection once a month     • FREESTYLE LITE test strip USE 1 STRIP TO CHECK GLUCOSE ONCE DAILY AS  DIRECTED 50 each 3   • hydrOXYzine (ATARAX) 25 MG tablet Take 1 tablet by mouth 3 (Three) Times a Day As Needed for Itching. 90 tablet 3   • Lancets (freestyle) lancets Use once daily as instructed for blood sugar testing 100 each 3   • lisinopril (PRINIVIL,ZESTRIL) 10 MG tablet Take 10 mg by mouth Daily.     • metFORMIN (GLUCOPHAGE) 1000 MG tablet Take 1 tablet by mouth 2 (Two) Times a Day With Meals. 180 tablet 1   • Multiple Vitamins-Minerals (ICAPS AREDS 2 PO) Take 1 capsule by mouth Daily.     • nitroglycerin (NITROSTAT) 0.4 MG SL tablet Place 1 tablet under the tongue As Needed for Chest Pain. Take no more than 3 doses in 15 minutes. 25 tablet 5   • promethazine (PHENERGAN) 25 MG tablet Take 1 tablet by mouth Every 8 (Eight) Hours As Needed for Nausea or Vomiting. Take as needed for nausea and vomiting 30 tablet 1   • ticagrelor (Brilinta) 90 MG tablet tablet Take 90 mg by mouth 2 (Two) Times a Day.     • [DISCONTINUED] ALPRAZolam (XANAX) 0.5 MG tablet Take 1 tablet by mouth 2 (Two) Times a Day As Needed for Anxiety. 40 tablet 2   • [DISCONTINUED] lisinopril (PRINIVIL,ZESTRIL) 2.5 MG tablet Take 4 tablets by mouth Daily. (Patient taking differently: Take 10 mg by mouth Daily. Pt takes 10mg qd)     • [DISCONTINUED] polyethylene glycol (MiraLax) 17 GM/SCOOP powder Take 17 g by mouth Daily As Needed.       Facility-Administered Encounter Medications as of 8/2/2021   Medication Dose Route Frequency Provider Last Rate Last Admin   • cyanocobalamin injection 1,000 mcg  1,000 mcg Intramuscular Q28 Days Kishore Grande MD   1,000 mcg at 07/19/21 1101     Plan    Her main complaint today is back pain and lower extremity numbness and paresthesias.  More recently she has the sensation of bee stings daily in both legs.  She also remains stressed caring for her  who has end-stage emphysema and now  requires quite a bit of help.  We discussed advanced directives which they do not have and she is given information today.    She will continue alprazolam at bedtime as needed for anxiety.    Blood pressure is controlled on carvedilol and lisinopril.    A1c is pending, foot exam was performed today.  She did have an eye exam last year she thinks in the fall.  She will continue Metformin and healthy diet.    Lipid panel is pending, continue atorvastatin and healthy diet.    GFR is pending, the treatment remains control of glucose and blood pressure and avoidance of NSAIDs.    B12 and vitamin D levels are pending, she will continue over-the-counter supplements.    She has previously known spondylolisthesis at L4-L5.  This probably explains why she has slightly worse neuropathic symptoms on the right than the left.    She will continue follow-up with her cardiologist at the [facility].  She is asymptomatic on aspirin, atorvastatin, carvedilol, lisinopril and Brilinta.      Reviewed use of high risk medication in the elderly: yes  Reviewed for potential of harmful drug interactions in the elderly: yes    Follow Up:  Return in about 6 months (around 2/2/2022) for follow up fasting.     There are no Patient Instructions on file for this visit.    An After Visit Summary and PPPS with all of these plans were given to the patient.

## 2021-08-03 LAB
ALBUMIN UR-MCNC: <1.2 MG/DL
CREAT UR-MCNC: 106.6 MG/DL
MICROALBUMIN/CREAT UR: NORMAL MG/G{CREAT}

## 2021-08-18 ENCOUNTER — CLINICAL SUPPORT (OUTPATIENT)
Dept: INTERNAL MEDICINE | Facility: CLINIC | Age: 86
End: 2021-08-18

## 2021-08-18 PROCEDURE — 96372 THER/PROPH/DIAG INJ SC/IM: CPT | Performed by: INTERNAL MEDICINE

## 2021-08-18 RX ADMIN — CYANOCOBALAMIN 1000 MCG: 1000 INJECTION, SOLUTION INTRAMUSCULAR; SUBCUTANEOUS at 11:17

## 2021-09-16 ENCOUNTER — CLINICAL SUPPORT (OUTPATIENT)
Dept: INTERNAL MEDICINE | Facility: CLINIC | Age: 86
End: 2021-09-16

## 2021-09-16 DIAGNOSIS — E53.8 B12 DEFICIENCY: ICD-10-CM

## 2021-09-16 PROCEDURE — 96372 THER/PROPH/DIAG INJ SC/IM: CPT | Performed by: INTERNAL MEDICINE

## 2021-09-16 RX ADMIN — CYANOCOBALAMIN 1000 MCG: 1000 INJECTION, SOLUTION INTRAMUSCULAR; SUBCUTANEOUS at 13:37

## 2021-10-01 ENCOUNTER — FLU SHOT (OUTPATIENT)
Dept: INTERNAL MEDICINE | Facility: CLINIC | Age: 86
End: 2021-10-01

## 2021-10-01 DIAGNOSIS — Z23 NEED FOR INFLUENZA VACCINATION: Primary | ICD-10-CM

## 2021-10-01 PROCEDURE — 90662 IIV NO PRSV INCREASED AG IM: CPT | Performed by: INTERNAL MEDICINE

## 2021-10-01 PROCEDURE — G0008 ADMIN INFLUENZA VIRUS VAC: HCPCS | Performed by: INTERNAL MEDICINE

## 2021-10-12 ENCOUNTER — CLINICAL SUPPORT (OUTPATIENT)
Dept: INTERNAL MEDICINE | Facility: CLINIC | Age: 86
End: 2021-10-12

## 2021-10-12 PROCEDURE — 96372 THER/PROPH/DIAG INJ SC/IM: CPT | Performed by: INTERNAL MEDICINE

## 2021-10-12 RX ADMIN — CYANOCOBALAMIN 1000 MCG: 1000 INJECTION, SOLUTION INTRAMUSCULAR; SUBCUTANEOUS at 12:06

## 2021-10-21 RX ORDER — PROMETHAZINE HYDROCHLORIDE 25 MG/1
TABLET ORAL
Qty: 30 TABLET | Refills: 1 | Status: SHIPPED | OUTPATIENT
Start: 2021-10-21 | End: 2022-08-10 | Stop reason: SDUPTHER

## 2021-10-21 NOTE — TELEPHONE ENCOUNTER
Rx Refill Note  Requested Prescriptions     Pending Prescriptions Disp Refills   • promethazine (PHENERGAN) 25 MG tablet [Pharmacy Med Name: Promethazine HCl 25 MG Oral Tablet] 30 tablet 0     Sig: TAKE 1 TABLET BY MOUTH EVERY 8 HOURS AS NEEDED FOR NAUSEA AND VOMITING      Last office visit with prescribing clinician: 8/2/2021      Next office visit with prescribing clinician: 2/2/2022            Makayla Chavez RN  10/21/21, 10:01 EDT

## 2021-11-16 RX ORDER — BLOOD-GLUCOSE METER
KIT MISCELLANEOUS
Qty: 100 EACH | Refills: 1 | Status: SHIPPED | OUTPATIENT
Start: 2021-11-16 | End: 2021-11-17 | Stop reason: SDUPTHER

## 2021-11-16 NOTE — TELEPHONE ENCOUNTER
Rx Refill Note  Requested Prescriptions     Pending Prescriptions Disp Refills   • metFORMIN (GLUCOPHAGE) 1000 MG tablet [Pharmacy Med Name: metFORMIN HCl 1000 MG Oral Tablet] 180 tablet 0     Sig: TAKE 1 TABLET BY MOUTH TWICE DAILY WITH MEALS   • FREESTYLE LITE test strip [Pharmacy Med Name: FreeStyle Lite Test In Vitro Strip] 100 each 0     Sig: USE 1 STRIP TO CHECK GLUCOSE ONCE DAILY AS DIRECTED      Last office visit with prescribing clinician: 8/2/2021      Next office visit with prescribing clinician: 11/16/2021            Trini River  11/16/21, 09:11 EST

## 2021-11-16 NOTE — TELEPHONE ENCOUNTER
Rx Refill Note  Requested Prescriptions     Pending Prescriptions Disp Refills   • metFORMIN (GLUCOPHAGE) 1000 MG tablet [Pharmacy Med Name: metFORMIN HCl 1000 MG Oral Tablet] 180 tablet 0     Sig: TAKE 1 TABLET BY MOUTH TWICE DAILY WITH MEALS   • FREESTYLE LITE test strip [Pharmacy Med Name: FreeStyle Lite Test In Vitro Strip] 100 each 0     Sig: USE 1 STRIP TO CHECK GLUCOSE ONCE DAILY AS DIRECTED      Last office visit with prescribing clinician: 8/2/2021      Next office visit with prescribing clinician: 11/16/2021            Trini River  11/16/21, 09:10 EST

## 2021-11-17 ENCOUNTER — CLINICAL SUPPORT (OUTPATIENT)
Dept: INTERNAL MEDICINE | Facility: CLINIC | Age: 86
End: 2021-11-17

## 2021-11-17 DIAGNOSIS — E53.8 B12 DEFICIENCY: ICD-10-CM

## 2021-11-17 PROCEDURE — 96372 THER/PROPH/DIAG INJ SC/IM: CPT | Performed by: INTERNAL MEDICINE

## 2021-11-17 RX ORDER — BLOOD-GLUCOSE METER
KIT MISCELLANEOUS
Qty: 100 EACH | Refills: 1 | Status: SHIPPED | OUTPATIENT
Start: 2021-11-17 | End: 2021-11-17 | Stop reason: SDUPTHER

## 2021-11-17 RX ORDER — BLOOD-GLUCOSE METER
KIT MISCELLANEOUS
Qty: 100 EACH | Refills: 1 | Status: SHIPPED | OUTPATIENT
Start: 2021-11-17 | End: 2023-02-17 | Stop reason: SDUPTHER

## 2021-11-17 RX ADMIN — CYANOCOBALAMIN 1000 MCG: 1000 INJECTION, SOLUTION INTRAMUSCULAR; SUBCUTANEOUS at 09:58

## 2021-11-23 DIAGNOSIS — F41.9 ANXIETY: ICD-10-CM

## 2021-11-23 RX ORDER — ALPRAZOLAM 0.5 MG/1
TABLET ORAL
Qty: 40 TABLET | Refills: 3 | Status: SHIPPED | OUTPATIENT
Start: 2021-11-23 | End: 2022-04-25 | Stop reason: SDUPTHER

## 2021-11-23 RX ORDER — HYDROXYZINE HYDROCHLORIDE 25 MG/1
TABLET, FILM COATED ORAL
Qty: 90 TABLET | Refills: 1 | Status: SHIPPED | OUTPATIENT
Start: 2021-11-23 | End: 2022-08-10 | Stop reason: SDUPTHER

## 2021-12-02 ENCOUNTER — OFFICE VISIT (OUTPATIENT)
Dept: INTERNAL MEDICINE | Facility: CLINIC | Age: 86
End: 2021-12-02

## 2021-12-02 VITALS
BODY MASS INDEX: 24.95 KG/M2 | DIASTOLIC BLOOD PRESSURE: 80 MMHG | WEIGHT: 140.8 LBS | HEART RATE: 78 BPM | SYSTOLIC BLOOD PRESSURE: 130 MMHG | OXYGEN SATURATION: 91 % | HEIGHT: 63 IN | TEMPERATURE: 98.7 F

## 2021-12-02 DIAGNOSIS — B37.9 YEAST INFECTION: ICD-10-CM

## 2021-12-02 DIAGNOSIS — J02.9 SORE THROAT: Primary | ICD-10-CM

## 2021-12-02 DIAGNOSIS — I10 BENIGN ESSENTIAL HYPERTENSION: ICD-10-CM

## 2021-12-02 DIAGNOSIS — R05.9 COUGH: ICD-10-CM

## 2021-12-02 DIAGNOSIS — E11.9 DIABETES MELLITUS WITHOUT COMPLICATION (HCC): ICD-10-CM

## 2021-12-02 LAB
EXPIRATION DATE: NORMAL
EXPIRATION DATE: NORMAL
FLUAV RNA RESP QL NAA+PROBE: NEGATIVE
FLUBV RNA RESP QL NAA+PROBE: NEGATIVE
INTERNAL CONTROL: NORMAL
INTERNAL CONTROL: NORMAL
Lab: NORMAL
Lab: NORMAL
S PYO RRNA THROAT QL PROBE: NEGATIVE

## 2021-12-02 PROCEDURE — 99214 OFFICE O/P EST MOD 30 MIN: CPT | Performed by: PHYSICIAN ASSISTANT

## 2021-12-02 PROCEDURE — U0004 COV-19 TEST NON-CDC HGH THRU: HCPCS | Performed by: PHYSICIAN ASSISTANT

## 2021-12-02 PROCEDURE — 87502 INFLUENZA DNA AMP PROBE: CPT | Performed by: PHYSICIAN ASSISTANT

## 2021-12-02 PROCEDURE — U0005 INFEC AGEN DETEC AMPLI PROBE: HCPCS | Performed by: PHYSICIAN ASSISTANT

## 2021-12-02 PROCEDURE — 87651 STREP A DNA AMP PROBE: CPT | Performed by: PHYSICIAN ASSISTANT

## 2021-12-02 RX ORDER — NYSTATIN 100000 [USP'U]/G
POWDER TOPICAL 2 TIMES DAILY
Qty: 30 G | Refills: 1 | Status: SHIPPED | OUTPATIENT
Start: 2021-12-02 | End: 2022-02-02

## 2021-12-02 RX ORDER — BENZONATATE 100 MG/1
100 CAPSULE ORAL 2 TIMES DAILY PRN
Qty: 30 CAPSULE | Refills: 1 | Status: SHIPPED | OUTPATIENT
Start: 2021-12-02 | End: 2022-02-02

## 2021-12-02 RX ORDER — FLUCONAZOLE 100 MG/1
100 TABLET ORAL DAILY
Qty: 3 TABLET | Refills: 1 | Status: SHIPPED | OUTPATIENT
Start: 2021-12-02 | End: 2021-12-05

## 2021-12-02 RX ORDER — NYSTATIN 100000 U/G
1 CREAM TOPICAL 2 TIMES DAILY
Qty: 30 G | Refills: 1 | Status: SHIPPED | OUTPATIENT
Start: 2021-12-02 | End: 2022-02-02

## 2021-12-02 NOTE — PROGRESS NOTES
Patient Care Team:  Kishore Grande MD as PCP - General  Kishore Grande MD as PCP - Family Medicine    Chief Complaint::   Chief Complaint   Patient presents with   • Sore Throat   • Vaginitis        Subjective     HPI  Pat is an 86 year old female with history of hyperlipidemia, hypertension, type 2 diabetes, who presents for evaluation of right ear pain, cough with congestion, and sore throat.  Was around family for Thanksgiving.  She has had both COVID vaccinations, she has not had booster shot.  She had an increase in body aches and backache.    Also experiencing rash under breast, pannus, and groin.  Complains of thick white vaginal discharge and vaginal itching.  Has used nystatin cream in the past, prescription has since .      The following portions of the patient's history were reviewed and updated as appropriate: active problem list, medication list, allergies, family history, social history    Review of Systems:   Review of Systems   Constitutional: Positive for chills, fatigue and fever. Negative for activity change, appetite change, diaphoresis, unexpected weight gain and unexpected weight loss.   HENT: Positive for congestion and sore throat. Negative for hearing loss and swollen glands.    Eyes: Negative for visual disturbance.   Respiratory: Positive for cough. Negative for chest tightness and shortness of breath.    Cardiovascular: Negative for chest pain, palpitations and leg swelling.   Gastrointestinal: Negative for abdominal pain, blood in stool, GERD and indigestion.   Endocrine: Negative for cold intolerance and heat intolerance.   Genitourinary: Positive for vaginal discharge. Negative for dysuria and hematuria.   Musculoskeletal: Positive for myalgias. Negative for arthralgias.   Skin: Positive for color change and rash. Negative for skin lesions.   Neurological: Negative for tremors, seizures, syncope, speech difficulty, weakness, headache, memory problem and  "confusion.   Hematological: Does not bruise/bleed easily.   Psychiatric/Behavioral: Negative for sleep disturbance and depressed mood. The patient is not nervous/anxious.        Vital Signs  Vitals:    12/02/21 1119   BP: 130/80   BP Location: Left arm   Patient Position: Sitting   Cuff Size: Adult   Pulse: 78   Temp: 98.7 °F (37.1 °C)   TempSrc: Temporal   SpO2: 91%   Weight: 63.9 kg (140 lb 12.8 oz)   Height: 160 cm (62.99\")   PainSc: 8  Comment: ear throat pain     Body mass index is 24.95 kg/m².    Labs  Office Visit on 12/02/2021   Component Date Value Ref Range Status   • POC Strep A, Molecular 12/02/2021 Negative  Negative Final   • Internal Control 12/02/2021 Passed  Passed Final   • Lot Number 12/02/2021 [id number]   Final   • Expiration Date 12/02/2021 07/15/2023   Final   • POC Influenza A, Molecular 12/02/2021 Negative  Negative Final   • POC Influenza B, Molecular 12/02/2021 Negative  Negative Final   • Internal Control 12/02/2021 Passed  Passed Final   • Lot Number 12/02/2021 [id number]   Final   • Expiration Date 12/02/2021 10/08/2022   Final       Imaging  No radiology results for the last 30 days.      Current Outpatient Medications:   •  ALPRAZolam (XANAX) 0.5 MG tablet, Take 1 tablet by mouth twice daily as needed for anxiety, Disp: 40 tablet, Rfl: 3  •  aspirin 81 MG EC tablet, Take 81 mg by mouth Daily. Take one daily, Disp: , Rfl:   •  atorvastatin (LIPITOR) 10 MG tablet, Take 1 tablet by mouth Daily., Disp: 90 tablet, Rfl: 3  •  benzonatate (Tessalon Perles) 100 MG capsule, Take 1 capsule by mouth 2 (Two) Times a Day As Needed for Cough., Disp: 30 capsule, Rfl: 1  •  carvedilol (COREG) 6.25 MG tablet, Take 6.25 mg by mouth 2 (Two) Times a Day With Meals., Disp: , Rfl:   •  Cholecalciferol (VITAMIN D3) 2000 units tablet, Take  by mouth. Take one daily, Disp: , Rfl:   •  Cyanocobalamin 1000 MCG/ML kit, Inject  as directed. Take injection once a month, Disp: , Rfl:   •  fluconazole (Diflucan) 100 MG " tablet, Take 1 tablet by mouth Daily for 3 days., Disp: 3 tablet, Rfl: 1  •  glucose blood (FREESTYLE LITE) test strip, Check once daily E11.9, Disp: 100 each, Rfl: 1  •  hydrOXYzine (ATARAX) 25 MG tablet, TAKE 1 TABLET BY MOUTH THREE TIMES DAILY AS NEEDED FOR ITCHING, Disp: 90 tablet, Rfl: 1  •  Lancets (freestyle) lancets, Use once daily as instructed for blood sugar testing, Disp: 100 each, Rfl: 3  •  lisinopril (PRINIVIL,ZESTRIL) 10 MG tablet, Take 10 mg by mouth Daily., Disp: , Rfl:   •  metFORMIN (GLUCOPHAGE) 1000 MG tablet, TAKE 1 TABLET BY MOUTH TWICE DAILY WITH MEALS, Disp: 180 tablet, Rfl: 1  •  Multiple Vitamins-Minerals (ICAPS AREDS 2 PO), Take 1 capsule by mouth Daily., Disp: , Rfl:   •  nitroglycerin (NITROSTAT) 0.4 MG SL tablet, Place 1 tablet under the tongue As Needed for Chest Pain. Take no more than 3 doses in 15 minutes., Disp: 25 tablet, Rfl: 5  •  nystatin (MYCOSTATIN) 285439 UNIT/GM cream, Apply 1 application topically to the appropriate area as directed 2 (Two) Times a Day., Disp: 30 g, Rfl: 1  •  nystatin (MYCOSTATIN) 194879 UNIT/GM powder, Apply  topically to the appropriate area as directed 2 (Two) Times a Day., Disp: 30 g, Rfl: 1  •  promethazine (PHENERGAN) 25 MG tablet, TAKE 1 TABLET BY MOUTH EVERY 8 HOURS AS NEEDED FOR NAUSEA AND VOMITING, Disp: 30 tablet, Rfl: 1  •  ticagrelor (Brilinta) 90 MG tablet tablet, Take 90 mg by mouth 2 (Two) Times a Day., Disp: , Rfl:     Current Facility-Administered Medications:   •  cyanocobalamin injection 1,000 mcg, 1,000 mcg, Intramuscular, Q28 Days, Kishore Grande MD, 1,000 mcg at 11/17/21 0958    Physical Exam:    Physical Exam  Vitals and nursing note reviewed.   Constitutional:       Appearance: Normal appearance.   HENT:      Head: Normocephalic and atraumatic.      Right Ear: Tympanic membrane, ear canal and external ear normal.      Left Ear: Tympanic membrane, ear canal and external ear normal.      Nose: Congestion present.       Mouth/Throat:      Mouth: Mucous membranes are moist.      Pharynx: Oropharynx is clear. Posterior oropharyngeal erythema present.   Eyes:      Extraocular Movements: Extraocular movements intact.      Conjunctiva/sclera: Conjunctivae normal.      Pupils: Pupils are equal, round, and reactive to light.   Cardiovascular:      Rate and Rhythm: Normal rate and regular rhythm.      Pulses: Normal pulses.      Heart sounds: Normal heart sounds.   Pulmonary:      Effort: Pulmonary effort is normal.      Breath sounds: Decreased breath sounds present. No wheezing.   Abdominal:      General: Abdomen is flat. Bowel sounds are normal.      Palpations: Abdomen is soft.      Tenderness: There is no right CVA tenderness or left CVA tenderness.   Musculoskeletal:      Cervical back: Normal range of motion and neck supple.   Lymphadenopathy:      Cervical: No cervical adenopathy.   Skin:     Findings: Rash present.          Neurological:      General: No focal deficit present.      Mental Status: She is alert and oriented to person, place, and time. Mental status is at baseline.   Psychiatric:         Mood and Affect: Mood normal.         Behavior: Behavior normal.         Thought Content: Thought content normal.         Judgment: Judgment normal.         Procedures        Assessment/Plan   Problem List Items Addressed This Visit        Cardiac and Vasculature    Benign essential hypertension    Overview     Continue lisinopril 10 mg tablets daily.         Relevant Medications    carvedilol (COREG) 6.25 MG tablet    lisinopril (PRINIVIL,ZESTRIL) 10 MG tablet       ENT    Sore throat - Primary    Overview     Strep swab obtained.         Relevant Orders    POCT Strep A, molecular (Completed)    POCT Flu A&B, Molecular (Completed)    COVID-19 PCR, [facility], NP SWAB IN [facility] VIRAL TRANSPORT MEDIA/ORAL SWISH 24-30 HR TAT - Swab, Nasopharynx       Endocrine and Metabolic    Diabetes mellitus without complication (HCC)    Overview      Continue Metformin 1000 mg tablets twice daily with meals.         Relevant Medications    metFORMIN (GLUCOPHAGE) 1000 MG tablet       Infectious Diseases    Yeast infection    Overview     Nystatin powder and cream sent to pharmacy.  Add fluconazole 100 mg daily for 3 days.         Relevant Medications    nystatin (MYCOSTATIN) 884821 UNIT/GM cream    nystatin (MYCOSTATIN) 135900 UNIT/GM powder    fluconazole (Diflucan) 100 MG tablet       Pulmonary and Pneumonias    Cough    Overview     Flu swab complete.  Covid swab obtained.  Patient instructed to quarantine until Covid results are completed.         Relevant Medications    benzonatate (Tessalon Perles) 100 MG capsule          No follow-ups on file.    Plan of care reviewed with patient at the conclusion of today's visit. Education was provided regarding diagnosis, management, and any prescribed or recommended OTC medications.Patient verbalizes understanding of and agreement with management plan.         Elaine Luke PA-C    Please note that portions of this note were completed with a voice recognition program.

## 2021-12-03 DIAGNOSIS — R05.9 COUGH: Primary | ICD-10-CM

## 2021-12-03 LAB — SARS-COV-2 RNA NOSE QL NAA+PROBE: NOT DETECTED

## 2021-12-03 RX ORDER — AZITHROMYCIN 250 MG/1
TABLET, FILM COATED ORAL
Qty: 6 TABLET | Refills: 0 | Status: SHIPPED | OUTPATIENT
Start: 2021-12-03 | End: 2022-02-02

## 2021-12-13 ENCOUNTER — CLINICAL SUPPORT (OUTPATIENT)
Dept: INTERNAL MEDICINE | Facility: CLINIC | Age: 86
End: 2021-12-13

## 2021-12-13 DIAGNOSIS — E53.8 B12 DEFICIENCY: ICD-10-CM

## 2021-12-13 PROCEDURE — 96372 THER/PROPH/DIAG INJ SC/IM: CPT | Performed by: INTERNAL MEDICINE

## 2021-12-13 RX ADMIN — CYANOCOBALAMIN 1000 MCG: 1000 INJECTION, SOLUTION INTRAMUSCULAR; SUBCUTANEOUS at 09:53

## 2021-12-20 RX ORDER — ATORVASTATIN CALCIUM 10 MG/1
TABLET, FILM COATED ORAL
Qty: 90 TABLET | Refills: 0 | Status: SHIPPED | OUTPATIENT
Start: 2021-12-20 | End: 2022-08-10 | Stop reason: SDUPTHER

## 2022-01-14 ENCOUNTER — CLINICAL SUPPORT (OUTPATIENT)
Dept: INTERNAL MEDICINE | Facility: CLINIC | Age: 87
End: 2022-01-14

## 2022-01-14 DIAGNOSIS — E53.8 B12 DEFICIENCY: ICD-10-CM

## 2022-01-14 PROCEDURE — 96372 THER/PROPH/DIAG INJ SC/IM: CPT | Performed by: INTERNAL MEDICINE

## 2022-01-14 RX ADMIN — CYANOCOBALAMIN 1000 MCG: 1000 INJECTION, SOLUTION INTRAMUSCULAR; SUBCUTANEOUS at 11:41

## 2022-02-02 ENCOUNTER — OFFICE VISIT (OUTPATIENT)
Dept: INTERNAL MEDICINE | Facility: CLINIC | Age: 87
End: 2022-02-02

## 2022-02-02 ENCOUNTER — LAB (OUTPATIENT)
Dept: LAB | Facility: HOSPITAL | Age: 87
End: 2022-02-02

## 2022-02-02 VITALS
TEMPERATURE: 98.6 F | WEIGHT: 143 LBS | HEART RATE: 74 BPM | BODY MASS INDEX: 25.34 KG/M2 | SYSTOLIC BLOOD PRESSURE: 132 MMHG | HEIGHT: 63 IN | DIASTOLIC BLOOD PRESSURE: 80 MMHG

## 2022-02-02 DIAGNOSIS — E78.2 MIXED HYPERLIPIDEMIA: ICD-10-CM

## 2022-02-02 DIAGNOSIS — E11.21 DIABETIC NEPHROPATHY ASSOCIATED WITH TYPE 2 DIABETES MELLITUS: ICD-10-CM

## 2022-02-02 DIAGNOSIS — E11.21 DIABETIC NEPHROPATHY ASSOCIATED WITH TYPE 2 DIABETES MELLITUS: Primary | ICD-10-CM

## 2022-02-02 DIAGNOSIS — I10 PRIMARY HYPERTENSION: ICD-10-CM

## 2022-02-02 DIAGNOSIS — I25.10 ASHD (ARTERIOSCLEROTIC HEART DISEASE): ICD-10-CM

## 2022-02-02 DIAGNOSIS — E55.9 VITAMIN D DEFICIENCY: ICD-10-CM

## 2022-02-02 DIAGNOSIS — N18.31 STAGE 3A CHRONIC KIDNEY DISEASE: ICD-10-CM

## 2022-02-02 DIAGNOSIS — E53.8 B12 DEFICIENCY: ICD-10-CM

## 2022-02-02 PROBLEM — J02.9 SORE THROAT: Status: RESOLVED | Noted: 2021-12-02 | Resolved: 2022-02-02

## 2022-02-02 PROBLEM — R05.9 COUGH: Status: RESOLVED | Noted: 2021-12-02 | Resolved: 2022-02-02

## 2022-02-02 LAB
25(OH)D3 SERPL-MCNC: 32 NG/ML (ref 30–100)
ALBUMIN SERPL-MCNC: 4.2 G/DL (ref 3.5–5.2)
ALBUMIN/GLOB SERPL: 1.3 G/DL
ALP SERPL-CCNC: 74 U/L (ref 39–117)
ALT SERPL W P-5'-P-CCNC: 8 U/L (ref 1–33)
ANION GAP SERPL CALCULATED.3IONS-SCNC: 12.2 MMOL/L (ref 5–15)
AST SERPL-CCNC: 10 U/L (ref 1–32)
BILIRUB SERPL-MCNC: 0.2 MG/DL (ref 0–1.2)
BUN SERPL-MCNC: 19 MG/DL (ref 8–23)
BUN/CREAT SERPL: 18.8 (ref 7–25)
CALCIUM SPEC-SCNC: 9.9 MG/DL (ref 8.6–10.5)
CHLORIDE SERPL-SCNC: 102 MMOL/L (ref 98–107)
CHOLEST SERPL-MCNC: 197 MG/DL (ref 0–200)
CO2 SERPL-SCNC: 22.8 MMOL/L (ref 22–29)
CREAT SERPL-MCNC: 1.01 MG/DL (ref 0.57–1)
GFR SERPL CREATININE-BSD FRML MDRD: 52 ML/MIN/1.73
GLOBULIN UR ELPH-MCNC: 3.2 GM/DL
GLUCOSE SERPL-MCNC: 146 MG/DL (ref 65–99)
HBA1C MFR BLD: 6.8 % (ref 4.8–5.6)
HDLC SERPL-MCNC: 49 MG/DL (ref 40–60)
LDLC SERPL CALC-MCNC: 103 MG/DL (ref 0–100)
LDLC/HDLC SERPL: 1.93 {RATIO}
POTASSIUM SERPL-SCNC: 4.7 MMOL/L (ref 3.5–5.2)
PROT SERPL-MCNC: 7.4 G/DL (ref 6–8.5)
SODIUM SERPL-SCNC: 137 MMOL/L (ref 136–145)
TRIGL SERPL-MCNC: 268 MG/DL (ref 0–150)
VLDLC SERPL-MCNC: 45 MG/DL (ref 5–40)

## 2022-02-02 PROCEDURE — 82306 VITAMIN D 25 HYDROXY: CPT

## 2022-02-02 PROCEDURE — 83036 HEMOGLOBIN GLYCOSYLATED A1C: CPT

## 2022-02-02 PROCEDURE — 80061 LIPID PANEL: CPT

## 2022-02-02 PROCEDURE — 99214 OFFICE O/P EST MOD 30 MIN: CPT | Performed by: INTERNAL MEDICINE

## 2022-02-02 PROCEDURE — 80053 COMPREHEN METABOLIC PANEL: CPT

## 2022-02-02 NOTE — ASSESSMENT & PLAN NOTE
- GFR is pending.  - The treatment is to continue with control of blood glucose and blood pressure, and avoidance of NSAIDs.

## 2022-02-02 NOTE — ASSESSMENT & PLAN NOTE
- A1c is pending.  - Her fasting glucose are reasonably well controlled consistently below 150.  - She will continue a healthy diet and metformin.

## 2022-02-02 NOTE — ASSESSMENT & PLAN NOTE
-  She is stable with Brilinta, lisinopril, atorvastatin, aspirin, and carvedilol.  - She does have stable angina.  - I cautioned her against shoveling snow in the extreme cold.  - She will follow up with her cardiologist at .

## 2022-02-02 NOTE — PROGRESS NOTES
[facility]     Tessa Adler  1935   [id number]      Patient Care Team:  Kishore Grande MD as PCP - General  Kishore Grande MD as PCP - Family Medicine    Chief Complaint::   Chief Complaint   Patient presents with   • Annual Exam     fasting        HPI  The patient has consented to being recorded using ANGELINE.    The patient presents for follow-up of her diabetes, chronic kidney disease, hypertension, hyperlipidemia, vitamin D deficiency, B12 deficiency, and coronary artery disease.    The patient states she is feeling well overall. She reports pain occasionally from exerting herself while performing house chores.     Diabetes  The patient states her blood sugar has been increased, most likely due to her unhealthy diet. The patient reports that she checks her blood sugar daily with fluctuating results that are under 150 mg/dL. She reports her spouse wakes 4 times during the night and interrupts her sleep.    The patient denies smoking and drinking.    B12 deficiency.  The patient has received a B12 injection several weeks ago. She will receive the next one in 05/2022.      Chronic Conditions:  diabetes, chronic kidney disease, hypertension, hyperlipidemia, vitamin D deficiency, B12 deficiency, and coronary artery disease    Patient Active Problem List   Diagnosis   • B12 deficiency   • Mixed hyperlipidemia   • Diabetes mellitus without complication (HCC)   • GERD without esophagitis   • Primary hypertension   • ASHD (arteriosclerotic heart disease)   • Vitamin D deficiency   • CKD (chronic kidney disease), stage III (HCC)   • Annual physical exam   • BMI 25.0-25.9,adult   • Diabetic nephropathy associated with type 2 diabetes mellitus (HCC)   • Hyperlipidemia due to type 2 diabetes mellitus (HCC)   • Idiopathic osteoarthritis   • Insomnia   • Irritable bowel syndrome   • Medicare annual wellness visit, subsequent   • Osteoarthritis   • Postmenopausal osteoporosis   • Trigger  finger of both hands   • Diabetic polyneuropathy associated with type 2 diabetes mellitus (HCC)   • Spondylolisthesis at L4-L5 level   • Yeast infection        Past Medical History:   Diagnosis Date   • ASHD (arteriosclerotic heart disease)    • B12 deficiency    • Rader's esophagus    • Benign essential hypertension    • CKD (chronic kidney disease), stage III (HCC)    • Diabetes mellitus without complication (HCC)    • GERD without esophagitis    • Hemorrhoids    • History of colonic polyps    • Mixed hyperlipidemia    • Myocardial infarction (HCC)     NONSTEMI   • Stress-induced cardiomyopathy 02/27/2015   • Vitamin D deficiency        Past Surgical History:   Procedure Laterality Date   • CARPAL TUNNEL RELEASE  1989   • CATARACT EXTRACTION     • CHOLECYSTECTOMY  2001   • CORONARY ANGIOPLASTY WITH STENT PLACEMENT  2010    PTCA, stents, LAD   • HEMORRHOIDECTOMY  1965   • HYSTERECTOMY  1968    AGE 33   • OOPHORECTOMY Bilateral     AGE 33       Family History   Problem Relation Age of Onset   • Breast cancer Neg Hx    • Ovarian cancer Neg Hx        Social History     Socioeconomic History   • Marital status:    Tobacco Use   • Smoking status: Never Smoker   • Smokeless tobacco: Never Used   Substance and Sexual Activity   • Alcohol use: No   • Drug use: No   • Sexual activity: Defer       Allergies   Allergen Reactions   • Clarithromycin Unknown (See Comments)     Unknown  Biaxin   • Codeine Unknown (See Comments)     unknown   • Dobutamine Unknown (See Comments)     unknown   • Iodinated Diagnostic Agents Unknown (See Comments)     unknown   • Ioversol Swelling     Tongue swelling  Contrast Dye    • Pantoprazole Sodium Unknown (See Comments)     Unknown  Protonix    • Perflutren Lipid Microsphere Unknown (See Comments)     Unknown  Definity    • Sulfa Antibiotics Unknown (See Comments)     Unknown  Bactrim   • Trimethoprim Unknown (See Comments)     Unknown  Bactrim         Current Outpatient Medications:    •  ALPRAZolam (XANAX) 0.5 MG tablet, Take 1 tablet by mouth twice daily as needed for anxiety, Disp: 40 tablet, Rfl: 3  •  aspirin 81 MG EC tablet, Take 81 mg by mouth Daily. Take one daily, Disp: , Rfl:   •  atorvastatin (LIPITOR) 10 MG tablet, Take 1 tablet by mouth once daily, Disp: 90 tablet, Rfl: 0  •  carvedilol (COREG) 6.25 MG tablet, Take 6.25 mg by mouth 2 (Two) Times a Day With Meals., Disp: , Rfl:   •  Cholecalciferol (VITAMIN D3) 2000 units tablet, Take  by mouth. Take one daily, Disp: , Rfl:   •  Cyanocobalamin 1000 MCG/ML kit, Inject  as directed. Take injection once a month, Disp: , Rfl:   •  glucose blood (FREESTYLE LITE) test strip, Check once daily E11.9, Disp: 100 each, Rfl: 1  •  hydrOXYzine (ATARAX) 25 MG tablet, TAKE 1 TABLET BY MOUTH THREE TIMES DAILY AS NEEDED FOR ITCHING, Disp: 90 tablet, Rfl: 1  •  Lancets (freestyle) lancets, Use once daily as instructed for blood sugar testing, Disp: 100 each, Rfl: 3  •  lisinopril (PRINIVIL,ZESTRIL) 10 MG tablet, Take 10 mg by mouth Daily., Disp: , Rfl:   •  metFORMIN (GLUCOPHAGE) 1000 MG tablet, TAKE 1 TABLET BY MOUTH TWICE DAILY WITH MEALS, Disp: 180 tablet, Rfl: 1  •  Multiple Vitamins-Minerals (ICAPS AREDS 2 PO), Take 1 capsule by mouth Daily., Disp: , Rfl:   •  nitroglycerin (NITROSTAT) 0.4 MG SL tablet, Place 1 tablet under the tongue As Needed for Chest Pain. Take no more than 3 doses in 15 minutes., Disp: 25 tablet, Rfl: 5  •  promethazine (PHENERGAN) 25 MG tablet, TAKE 1 TABLET BY MOUTH EVERY 8 HOURS AS NEEDED FOR NAUSEA AND VOMITING, Disp: 30 tablet, Rfl: 1  •  ticagrelor (Brilinta) 90 MG tablet tablet, Take 90 mg by mouth 2 (Two) Times a Day., Disp: , Rfl:     Current Facility-Administered Medications:   •  cyanocobalamin injection 1,000 mcg, 1,000 mcg, Intramuscular, Q28 Days, Kishore Grande MD, 1,000 mcg at 01/14/22 1141    Review of Systems   A review of systems was performed, and the pertinent positives are noted in the  "HPI.    Vital Signs  Vitals:    02/02/22 0913   BP: 132/80   Pulse: 74   Temp: 98.6 °F (37 °C)   Weight: 64.9 kg (143 lb)   Height: 160 cm (62.99\")   PainSc: 0-No pain       Physical Exam  Vitals reviewed.   Constitutional:       Appearance: She is well-developed.   HENT:      Head: Normocephalic and atraumatic.   Cardiovascular:      Rate and Rhythm: Normal rate and regular rhythm.      Heart sounds: Normal heart sounds. No murmur heard.      Pulmonary:      Effort: Pulmonary effort is normal.      Breath sounds: Normal breath sounds.   Neurological:      Mental Status: She is alert and oriented to person, place, and time.          Procedures    ACE III MINI             Assessment/Plan:    Diagnoses and all orders for this visit:    1. Diabetic nephropathy associated with type 2 diabetes mellitus (HCC) (Primary)  Assessment & Plan:  - A1c is pending.  - Her fasting glucose are reasonably well controlled consistently below 150.  - She will continue a healthy diet and metformin.    Orders:  -     Comprehensive Metabolic Panel; Future  -     Hemoglobin A1c; Future    2. Stage 3a chronic kidney disease (HCC)  Assessment & Plan:  - GFR is pending.  - The treatment is to continue with control of blood glucose and blood pressure, and avoidance of NSAIDs.      3. Primary hypertension  Assessment & Plan:  Blood pressure is well controlled; on lisinopril.      4. Mixed hyperlipidemia  Assessment & Plan:  - Lipid panel was pending.  - She will continue atorvastatin and a healthy diet.      Orders:  -     Lipid Panel; Future    5. Vitamin D deficiency  Assessment & Plan:  - Vitamin D level is pending.  - She will continue taking 2000 units of vitamin D daily.    Orders:  -     Vitamin D 25 Hydroxy; Future    6. B12 deficiency  Assessment & Plan:  She will continue monthly B12 injections.      7. ASHD (arteriosclerotic heart disease)  Assessment & Plan:  -  She is stable with Brilinta, lisinopril, atorvastatin, aspirin, and " carvedilol.  - She does have stable angina.  - I cautioned her against shoveling snow in the extreme cold.  - She will follow up with her cardiologist at .            Plan of care reviewed with patient at the conclusion of today's visit. Education was provided regarding diagnosis, management, and any prescribed or recommended OTC medications.Patient verbalizes understanding of and agreement with management plan.         Kishore Grande MD     Transcribed from ambient dictation for Kishore Grande MD by Angel Steele.   02/02/22   15:15 EST    Patient verbalized consent to the visit recording.

## 2022-02-14 ENCOUNTER — CLINICAL SUPPORT (OUTPATIENT)
Dept: INTERNAL MEDICINE | Facility: CLINIC | Age: 87
End: 2022-02-14

## 2022-02-14 DIAGNOSIS — E53.8 B12 DEFICIENCY: ICD-10-CM

## 2022-02-14 PROCEDURE — 96372 THER/PROPH/DIAG INJ SC/IM: CPT | Performed by: INTERNAL MEDICINE

## 2022-02-14 RX ADMIN — CYANOCOBALAMIN 1000 MCG: 1000 INJECTION, SOLUTION INTRAMUSCULAR; SUBCUTANEOUS at 11:12

## 2022-04-14 ENCOUNTER — CLINICAL SUPPORT (OUTPATIENT)
Dept: INTERNAL MEDICINE | Facility: CLINIC | Age: 87
End: 2022-04-14

## 2022-04-14 DIAGNOSIS — E53.8 B12 DEFICIENCY: ICD-10-CM

## 2022-04-14 PROCEDURE — 96372 THER/PROPH/DIAG INJ SC/IM: CPT | Performed by: INTERNAL MEDICINE

## 2022-04-14 RX ADMIN — CYANOCOBALAMIN 1000 MCG: 1000 INJECTION, SOLUTION INTRAMUSCULAR; SUBCUTANEOUS at 09:51

## 2022-04-25 DIAGNOSIS — F41.9 ANXIETY: ICD-10-CM

## 2022-04-25 RX ORDER — ALPRAZOLAM 0.5 MG/1
0.5 TABLET ORAL 2 TIMES DAILY PRN
Qty: 60 TABLET | Refills: 3 | Status: SHIPPED | OUTPATIENT
Start: 2022-04-25 | End: 2022-11-16 | Stop reason: SDUPTHER

## 2022-04-25 NOTE — TELEPHONE ENCOUNTER
Rx Refill Note  Requested Prescriptions     Pending Prescriptions Disp Refills   • ALPRAZolam (XANAX) 0.5 MG tablet 40 tablet 3     Sig: Take 1 tablet by mouth 2 (Two) Times a Day As Needed. for anxiety      Last office visit with prescribing clinician: 2/2/2022      Next office visit with prescribing clinician: 8/10/2022     LA: 11/23/21 #40 3R           {TIP  Please add Last Relevant Lab Date if appropriate:23}     Lilia Morrissey LPN  04/25/22, 14:31 EDT

## 2022-04-25 NOTE — TELEPHONE ENCOUNTER
Caller: Tessa Adler    Relationship: Self    Best call back number: [phone]    Requested Prescriptions:   Requested Prescriptions     Pending Prescriptions Disp Refills   • ALPRAZolam (XANAX) 0.5 MG tablet 40 tablet 3     Sig: Take 1 tablet by mouth 2 (Two) Times a Day As Needed. for anxiety        Pharmacy where request should be sent: [facility] [address] [place] [address] [phone] [facility] [phone] FX     Additional details provided by patient: PATIENT HAS ONE LEFT    Does the patient have less than a 3 day supply:  [x] Yes  [] No    Lewis Zazueta Rep   04/25/22 14:27 EDT

## 2022-05-16 ENCOUNTER — CLINICAL SUPPORT (OUTPATIENT)
Dept: INTERNAL MEDICINE | Facility: CLINIC | Age: 87
End: 2022-05-16

## 2022-05-16 DIAGNOSIS — E53.8 B12 DEFICIENCY: ICD-10-CM

## 2022-05-16 PROCEDURE — 96372 THER/PROPH/DIAG INJ SC/IM: CPT | Performed by: INTERNAL MEDICINE

## 2022-05-16 RX ADMIN — CYANOCOBALAMIN 1000 MCG: 1000 INJECTION, SOLUTION INTRAMUSCULAR; SUBCUTANEOUS at 12:34

## 2022-05-16 NOTE — TELEPHONE ENCOUNTER
Rx Refill Note  Requested Prescriptions     Pending Prescriptions Disp Refills   • metFORMIN (GLUCOPHAGE) 1000 MG tablet 180 tablet 1     Sig: Take 1 tablet by mouth 2 (Two) Times a Day With Meals.      Last office visit with prescribing clinician: 2/2/2022      Next office visit with prescribing clinician: 8/10/2022            Khalida Brown LPN  05/16/22, 12:43 EDT

## 2022-06-21 ENCOUNTER — CLINICAL SUPPORT (OUTPATIENT)
Dept: INTERNAL MEDICINE | Facility: CLINIC | Age: 87
End: 2022-06-21

## 2022-06-21 DIAGNOSIS — E53.8 B12 DEFICIENCY: ICD-10-CM

## 2022-06-21 PROCEDURE — 96372 THER/PROPH/DIAG INJ SC/IM: CPT | Performed by: INTERNAL MEDICINE

## 2022-06-21 RX ADMIN — CYANOCOBALAMIN 1000 MCG: 1000 INJECTION, SOLUTION INTRAMUSCULAR; SUBCUTANEOUS at 08:49

## 2022-08-10 ENCOUNTER — LAB (OUTPATIENT)
Dept: LAB | Facility: HOSPITAL | Age: 87
End: 2022-08-10

## 2022-08-10 ENCOUNTER — OFFICE VISIT (OUTPATIENT)
Dept: INTERNAL MEDICINE | Facility: CLINIC | Age: 87
End: 2022-08-10

## 2022-08-10 VITALS
BODY MASS INDEX: 24.27 KG/M2 | TEMPERATURE: 98 F | SYSTOLIC BLOOD PRESSURE: 128 MMHG | HEIGHT: 63 IN | HEART RATE: 71 BPM | WEIGHT: 137 LBS | DIASTOLIC BLOOD PRESSURE: 66 MMHG

## 2022-08-10 DIAGNOSIS — M81.0 POSTMENOPAUSAL OSTEOPOROSIS: ICD-10-CM

## 2022-08-10 DIAGNOSIS — I10 PRIMARY HYPERTENSION: ICD-10-CM

## 2022-08-10 DIAGNOSIS — E53.8 B12 DEFICIENCY: ICD-10-CM

## 2022-08-10 DIAGNOSIS — E78.2 MIXED HYPERLIPIDEMIA: ICD-10-CM

## 2022-08-10 DIAGNOSIS — I25.10 ASHD (ARTERIOSCLEROTIC HEART DISEASE): ICD-10-CM

## 2022-08-10 DIAGNOSIS — E55.9 VITAMIN D DEFICIENCY: ICD-10-CM

## 2022-08-10 DIAGNOSIS — E11.42 DIABETIC POLYNEUROPATHY ASSOCIATED WITH TYPE 2 DIABETES MELLITUS: ICD-10-CM

## 2022-08-10 DIAGNOSIS — Z00.00 PREVENTATIVE HEALTH CARE: Primary | ICD-10-CM

## 2022-08-10 DIAGNOSIS — M19.90 IDIOPATHIC OSTEOARTHRITIS: ICD-10-CM

## 2022-08-10 LAB
25(OH)D3 SERPL-MCNC: 34.5 NG/ML (ref 30–100)
ALBUMIN SERPL-MCNC: 4.3 G/DL (ref 3.5–5.2)
ALBUMIN UR-MCNC: <1.2 MG/DL
ALBUMIN/GLOB SERPL: 1.5 G/DL
ALP SERPL-CCNC: 61 U/L (ref 39–117)
ALT SERPL W P-5'-P-CCNC: 5 U/L (ref 1–33)
ANION GAP SERPL CALCULATED.3IONS-SCNC: 13 MMOL/L (ref 5–15)
AST SERPL-CCNC: 9 U/L (ref 1–32)
BASOPHILS # BLD AUTO: 0.12 10*3/MM3 (ref 0–0.2)
BASOPHILS NFR BLD AUTO: 1.4 % (ref 0–1.5)
BILIRUB SERPL-MCNC: 0.2 MG/DL (ref 0–1.2)
BUN SERPL-MCNC: 18 MG/DL (ref 8–23)
BUN/CREAT SERPL: 17.6 (ref 7–25)
CALCIUM SPEC-SCNC: 9.9 MG/DL (ref 8.6–10.5)
CHLORIDE SERPL-SCNC: 106 MMOL/L (ref 98–107)
CHOLEST SERPL-MCNC: 222 MG/DL (ref 0–200)
CO2 SERPL-SCNC: 20 MMOL/L (ref 22–29)
CREAT SERPL-MCNC: 1.02 MG/DL (ref 0.57–1)
CREAT UR-MCNC: 61.2 MG/DL
DEPRECATED RDW RBC AUTO: 41.6 FL (ref 37–54)
EGFRCR SERPLBLD CKD-EPI 2021: 54 ML/MIN/1.73
EOSINOPHIL # BLD AUTO: 0.45 10*3/MM3 (ref 0–0.4)
EOSINOPHIL NFR BLD AUTO: 5.1 % (ref 0.3–6.2)
ERYTHROCYTE [DISTWIDTH] IN BLOOD BY AUTOMATED COUNT: 13.4 % (ref 12.3–15.4)
GLOBULIN UR ELPH-MCNC: 2.9 GM/DL
GLUCOSE SERPL-MCNC: 119 MG/DL (ref 65–99)
HBA1C MFR BLD: 6.9 % (ref 4.8–5.6)
HCT VFR BLD AUTO: 33.5 % (ref 34–46.6)
HDLC SERPL-MCNC: 50 MG/DL (ref 40–60)
HGB BLD-MCNC: 10.9 G/DL (ref 12–15.9)
IMM GRANULOCYTES # BLD AUTO: 0.07 10*3/MM3 (ref 0–0.05)
IMM GRANULOCYTES NFR BLD AUTO: 0.8 % (ref 0–0.5)
LDLC SERPL CALC-MCNC: 121 MG/DL (ref 0–100)
LDLC/HDLC SERPL: 2.27 {RATIO}
LYMPHOCYTES # BLD AUTO: 2.63 10*3/MM3 (ref 0.7–3.1)
LYMPHOCYTES NFR BLD AUTO: 29.9 % (ref 19.6–45.3)
MCH RBC QN AUTO: 28 PG (ref 26.6–33)
MCHC RBC AUTO-ENTMCNC: 32.5 G/DL (ref 31.5–35.7)
MCV RBC AUTO: 86.1 FL (ref 79–97)
MICROALBUMIN/CREAT UR: NORMAL MG/G{CREAT}
MONOCYTES # BLD AUTO: 0.52 10*3/MM3 (ref 0.1–0.9)
MONOCYTES NFR BLD AUTO: 5.9 % (ref 5–12)
NEUTROPHILS NFR BLD AUTO: 5.01 10*3/MM3 (ref 1.7–7)
NEUTROPHILS NFR BLD AUTO: 56.9 % (ref 42.7–76)
NRBC BLD AUTO-RTO: 0 /100 WBC (ref 0–0.2)
PLATELET # BLD AUTO: 347 10*3/MM3 (ref 140–450)
PMV BLD AUTO: 9.7 FL (ref 6–12)
POTASSIUM SERPL-SCNC: 4.8 MMOL/L (ref 3.5–5.2)
PROT SERPL-MCNC: 7.2 G/DL (ref 6–8.5)
RBC # BLD AUTO: 3.89 10*6/MM3 (ref 3.77–5.28)
SODIUM SERPL-SCNC: 139 MMOL/L (ref 136–145)
TRIGL SERPL-MCNC: 292 MG/DL (ref 0–150)
VIT B12 BLD-MCNC: 476 PG/ML (ref 211–946)
VLDLC SERPL-MCNC: 51 MG/DL (ref 5–40)
WBC NRBC COR # BLD: 8.8 10*3/MM3 (ref 3.4–10.8)

## 2022-08-10 PROCEDURE — 1170F FXNL STATUS ASSESSED: CPT | Performed by: INTERNAL MEDICINE

## 2022-08-10 PROCEDURE — 82306 VITAMIN D 25 HYDROXY: CPT

## 2022-08-10 PROCEDURE — 80053 COMPREHEN METABOLIC PANEL: CPT

## 2022-08-10 PROCEDURE — 82607 VITAMIN B-12: CPT

## 2022-08-10 PROCEDURE — G0439 PPPS, SUBSEQ VISIT: HCPCS | Performed by: INTERNAL MEDICINE

## 2022-08-10 PROCEDURE — 85025 COMPLETE CBC W/AUTO DIFF WBC: CPT

## 2022-08-10 PROCEDURE — 83036 HEMOGLOBIN GLYCOSYLATED A1C: CPT

## 2022-08-10 PROCEDURE — 82043 UR ALBUMIN QUANTITATIVE: CPT

## 2022-08-10 PROCEDURE — 80061 LIPID PANEL: CPT

## 2022-08-10 PROCEDURE — 1159F MED LIST DOCD IN RCRD: CPT | Performed by: INTERNAL MEDICINE

## 2022-08-10 PROCEDURE — 82570 ASSAY OF URINE CREATININE: CPT

## 2022-08-10 RX ORDER — ATORVASTATIN CALCIUM 10 MG/1
10 TABLET, FILM COATED ORAL DAILY
Qty: 30 TABLET | Refills: 5 | Status: SHIPPED | OUTPATIENT
Start: 2022-08-10 | End: 2023-02-23

## 2022-08-10 RX ORDER — PROMETHAZINE HYDROCHLORIDE 25 MG/1
25 TABLET ORAL EVERY 8 HOURS PRN
Qty: 30 TABLET | Refills: 1 | Status: SHIPPED | OUTPATIENT
Start: 2022-08-10

## 2022-08-10 RX ORDER — HYDROXYZINE HYDROCHLORIDE 25 MG/1
25 TABLET, FILM COATED ORAL 3 TIMES DAILY PRN
Qty: 30 TABLET | Refills: 5 | Status: SHIPPED | OUTPATIENT
Start: 2022-08-10 | End: 2023-02-23 | Stop reason: SDUPTHER

## 2022-08-10 RX ORDER — LISINOPRIL 10 MG/1
10 TABLET ORAL DAILY
Qty: 30 TABLET | Refills: 5 | Status: SHIPPED | OUTPATIENT
Start: 2022-08-10 | End: 2023-02-23 | Stop reason: SDUPTHER

## 2022-08-10 RX ADMIN — CYANOCOBALAMIN 1000 MCG: 1000 INJECTION, SOLUTION INTRAMUSCULAR; SUBCUTANEOUS at 11:04

## 2022-08-10 NOTE — PROGRESS NOTES
QUICK REFERENCE INFORMATION:  The ABCs of the Annual Wellness Visit    Subsequent Medicare Wellness Visit    HEALTH RISK ASSESSMENT    1935    Recent Hospitalizations:  No hospitalization(s) within the last year..        Current Medical Providers:  Patient Care Team:  Kishore Grande MD as PCP - General  Kishore Grande MD as PCP - Family Medicine        Smoking Status:  Social History     Tobacco Use   Smoking Status Never Smoker   Smokeless Tobacco Never Used       Alcohol Consumption:  Social History     Substance and Sexual Activity   Alcohol Use No       Depression Screen:   PHQ-2/PHQ-9 Depression Screening 8/10/2022   Retired PHQ-9 Total Score -   Retired Total Score -   Little Interest or Pleasure in Doing Things 0-->not at all   Feeling Down, Depressed or Hopeless 1-->several days   PHQ-9: Brief Depression Severity Measure Score 1       Health Habits and Functional and Cognitive Screening:  Functional & Cognitive Status 8/10/2022   Do you have difficulty preparing food and eating? No   Do you have difficulty bathing yourself, getting dressed or grooming yourself? No   Do you have difficulty using the toilet? No   Do you have difficulty moving around from place to place? No   Do you have trouble with steps or getting out of a bed or a chair? No   Current Diet Well Balanced Diet   Dental Exam Up to date        Dental Exam Comment 2022   Eye Exam Up to date   Exercise (times per week) 0 times per week   Current Exercises Include No Regular Exercise        Exercise Comment -   Current Exercise Activities Include -   Do you need help using the phone?  No   Are you deaf or do you have serious difficulty hearing?  No   Do you need help with transportation? No   Do you need help shopping? No   Do you need help preparing meals?  No   Do you need help with housework?  No   Do you need help with laundry? No   Do you need help taking your medications? No   Do you need help managing money? No   Do you  ever drive or ride in a car without wearing a seat belt? No   Have you felt unusual stress, anger or loneliness in the last month? Yes   Who do you live with? Spouse   If you need help, do you have trouble finding someone available to you? No   Have you been bothered in the last four weeks by sexual problems? No   Do you have difficulty concentrating, remembering or making decisions? No       Fall Risk Screen:  LOREE Fall Risk Assessment was completed, and patient is at LOW risk for falls.Assessment completed on:8/10/2022    ACE III MINI        Does the patient have evidence of cognitive impairment? No    Aspirin use counseling: Taking ASA appropriately as indicated    Recent Lab Results:  CMP:  Lab Results   Component Value Date    BUN 19 02/02/2022    CREATININE 1.01 (H) 02/02/2022    EGFRIFNONA 52 (L) 02/02/2022    BCR 18.8 02/02/2022     02/02/2022    K 4.7 02/02/2022    CO2 22.8 02/02/2022    CALCIUM 9.9 02/02/2022    ALBUMIN 4.20 02/02/2022    BILITOT 0.2 02/02/2022    ALKPHOS 74 02/02/2022    AST 10 02/02/2022    ALT 8 02/02/2022     HbA1c:  Lab Results   Component Value Date    HGBA1C 6.80 (H) 02/02/2022    HGBA1C 7.19 (H) 08/02/2021     Microalbumin:  Lab Results   Component Value Date    MICROALBUR <1.2 08/02/2021     Lipid Panel  Lab Results   Component Value Date    CHOL 197 02/02/2022    TRIG 268 (H) 02/02/2022    HDL 49 02/02/2022     (H) 02/02/2022    AST 10 02/02/2022    ALT 8 02/02/2022       Visual Acuity:  No exam data present    Age-appropriate Screening Schedule:  Refer to the list below for future screening recommendations based on patient's age, sex and/or medical conditions. Orders for these recommended tests are listed in the plan section. The patient has been provided with a written plan.    Health Maintenance   Topic Date Due   • ZOSTER VACCINE (1 of 2) Never done   • DXA SCAN  08/01/2018   • HEMOGLOBIN A1C  08/02/2022   • URINE MICROALBUMIN  08/02/2022   • INFLUENZA VACCINE   10/01/2022   • DIABETIC EYE EXAM  11/10/2022   • LIPID PANEL  02/02/2023   • TDAP/TD VACCINES (2 - Td or Tdap) 11/08/2027        Subjective   History of Present Illness    Tessa Adler is a 87 y.o. female who presents for initial Medicare wellness visit and for follow-up of diabetes, hypertension, hyperlipidemia, coronary artery disease, vitamin D deficiency, B12 deficiency, and osteoporosis.    Arthritis  The patient states she has been experiencing pain all over her body every morning and night. She has pain in her back, hip, knees, and hands. At night, she cannot lie on her right side due to the pain. The pain radiates down her knees. She takes Tylenol 500 mg in the morning and 500 mg when she goes to bed.    Coronary artery disease  The patient reports that her cardiologist changed her blood thinner medication to 60 mg. She has had heart attacks every 5 years. She had an echocardiogram last year. The patient states that she has been experiencing chest pain. While at rest, she has chest pain, but it does not last long. She states when she goes to get the groceries, she carries about 15 bags, and then has pain. She took the garbage cans out on Monday night, sat down and rested for about 25 minutes in a chair. She then went back outside and got the other garbage cans and sat down for 20 minutes, and did not have any pain. She gets depressed because she can not do what she wants to do. She takes Xanax twice a day.    CHRONIC CONDITIONS  Diabetes, hypertension, hyperlipidemia, coronary artery disease, vitamin D deficiency, B12 deficiency and osteoporosis.    The following portions of the patient's history were reviewed and updated as appropriate: allergies, current medications, past family history, past medical history, past social history, past surgical history and problem list.    Outpatient Medications Prior to Visit   Medication Sig Dispense Refill   • ALPRAZolam (XANAX) 0.5 MG tablet Take 1 tablet by  mouth 2 (Two) Times a Day As Needed for Anxiety. for anxiety 60 tablet 3   • aspirin 81 MG EC tablet Take 81 mg by mouth Daily. Take one daily     • carvedilol (COREG) 6.25 MG tablet Take 6.25 mg by mouth 2 (Two) Times a Day With Meals.     • Cholecalciferol (VITAMIN D3) 2000 units tablet Take  by mouth. Take one daily     • Cyanocobalamin 1000 MCG/ML kit Inject  as directed. Take injection once a month     • glucose blood (FREESTYLE LITE) test strip Check once daily E11.9 100 each 1   • Lancets (freestyle) lancets Use once daily as instructed for blood sugar testing 100 each 3   • metFORMIN (GLUCOPHAGE) 1000 MG tablet Take 1 tablet by mouth 2 (Two) Times a Day With Meals. 180 tablet 1   • Multiple Vitamins-Minerals (ICAPS AREDS 2 PO) Take 1 capsule by mouth Daily.     • nitroglycerin (NITROSTAT) 0.4 MG SL tablet Place 1 tablet under the tongue As Needed for Chest Pain. Take no more than 3 doses in 15 minutes. 25 tablet 5   • ticagrelor (BRILINTA) 60 MG tablet tablet Take 60 mg by mouth 2 (Two) Times a Day.     • atorvastatin (LIPITOR) 10 MG tablet Take 1 tablet by mouth once daily 90 tablet 0   • hydrOXYzine (ATARAX) 25 MG tablet TAKE 1 TABLET BY MOUTH THREE TIMES DAILY AS NEEDED FOR ITCHING 90 tablet 1   • lisinopril (PRINIVIL,ZESTRIL) 10 MG tablet Take 10 mg by mouth Daily.     • promethazine (PHENERGAN) 25 MG tablet TAKE 1 TABLET BY MOUTH EVERY 8 HOURS AS NEEDED FOR NAUSEA AND VOMITING 30 tablet 1   • ticagrelor (BRILINTA) 90 MG tablet tablet Take 90 mg by mouth 2 (Two) Times a Day.       Facility-Administered Medications Prior to Visit   Medication Dose Route Frequency Provider Last Rate Last Admin   • cyanocobalamin injection 1,000 mcg  1,000 mcg Intramuscular Q28 Days Kishore Grande MD   1,000 mcg at 08/10/22 1104       Patient Active Problem List   Diagnosis   • B12 deficiency   • Mixed hyperlipidemia   • GERD without esophagitis   • Primary hypertension   • ASHD (arteriosclerotic heart disease)   •  Vitamin D deficiency   • CKD (chronic kidney disease), stage III ([facility])   • Annual physical exam   • BMI 25.0-25.9,adult   • Diabetic nephropathy associated with type 2 diabetes mellitus (HCC)   • Hyperlipidemia due to type 2 diabetes mellitus (HCC)   • Idiopathic osteoarthritis   • Insomnia   • Irritable bowel syndrome   • Medicare annual wellness visit, subsequent   • Osteoarthritis   • Postmenopausal osteoporosis   • Trigger finger of both hands   • Diabetic polyneuropathy associated with type 2 diabetes mellitus (HCC)   • Spondylolisthesis at L4-L5 level   • Yeast infection       Advance Care Planning:  ACP discussion was held with the patient during this visit. Patient has an advance directive (not in EMR), copy requested.    Identification of Risk Factors:  Risk factors include: Advance Directive Discussion.    Review of Systems   Constitutional: Negative for chills, fatigue and fever.   HENT: Negative for congestion, ear pain and sinus pressure.    Respiratory: Negative for cough, chest tightness, shortness of breath and wheezing.    Cardiovascular: Negative for chest pain and palpitations.   Gastrointestinal: Negative for abdominal pain, blood in stool and constipation.   Skin: Negative for color change.   Allergic/Immunologic: Negative for environmental allergies.   Neurological: Negative for dizziness, speech difficulty and headaches.   Psychiatric/Behavioral: Negative for confusion. The patient is not nervous/anxious.        Compared to one year ago, the patient feels her physical health is the same.  Compared to one year ago, the patient feels her mental health is the same.    Objective     Physical Exam  Vitals reviewed.   Constitutional:       Appearance: She is well-developed.   HENT:      Head: Normocephalic and atraumatic.   Cardiovascular:      Rate and Rhythm: Normal rate and regular rhythm.      Heart sounds: Normal heart sounds. No murmur heard.  Pulmonary:      Effort: Pulmonary effort is  "normal.      Breath sounds: Normal breath sounds.   Musculoskeletal:      Comments: she has severe osteoarthritic osteoarthritis changes in both knees.   Feet:      Comments: The toes bilaterally are cool to touch, but there is no ischemia.  Neurological:      Mental Status: She is alert and oriented to person, place, and time.          Procedures     Vitals:    08/10/22 0908   BP: 128/66   BP Location: Left arm   Patient Position: Sitting   Cuff Size: Adult   Pulse: 71   Temp: 98 °F (36.7 °C)   TempSrc: Temporal   Weight: 62.1 kg (137 lb)   Height: 160 cm (62.99\")   PainSc:   7       BMI is within normal parameters. No other follow-up for BMI required.      Assessment & Plan   1. Prevention  - She is fully vaccinated against COVID-19. Unfortunately, her overall health has declined due to multifactorial problems outlined below as well as the care of her .    2. Diabetes  - A1c is pending.  - Foot exam was performed today.  - She will continue metformin and healthy diet.    3. Hypertension  - Blood pressure is controlled on carvedilol.    4. Hyperlipidemia  - Lipid panel is pending.  - Continue atorvastatin.    5. Coronary artery disease  - She appears to have stable angina.  - She will continue follow-up with cardiology and continue aspirin, atorvastatin, carvedilol, Brilinta, and lisinopril.    6. Vitamin D deficiency  - Vitamin D level is pending.  - She will continue taking 2000 international units of vitamin D daily.    7. B12 deficiency  - B12 level is pending.  - She 'll continue taking cyanocobalamin B12 monthly.    8. Osteoporosis  - She will continue calcium and vitamin D.    9. Osteoarthritis  - She has significant arthritis in multiple joints but feels she is unable to having any intervention secondary to having to take care of her  and her own health.    Patient Self-Management and Personalized Health Advice  The patient has been provided with information about: diet, exercise and fall " prevention and preventive services including:   · Annual Wellness Visit (AWV).    Outpatient Encounter Medications as of 8/10/2022   Medication Sig Dispense Refill   • ALPRAZolam (XANAX) 0.5 MG tablet Take 1 tablet by mouth 2 (Two) Times a Day As Needed for Anxiety. for anxiety 60 tablet 3   • aspirin 81 MG EC tablet Take 81 mg by mouth Daily. Take one daily     • atorvastatin (LIPITOR) 10 MG tablet Take 1 tablet by mouth Daily. 30 tablet 5   • carvedilol (COREG) 6.25 MG tablet Take 6.25 mg by mouth 2 (Two) Times a Day With Meals.     • Cholecalciferol (VITAMIN D3) 2000 units tablet Take  by mouth. Take one daily     • Cyanocobalamin 1000 MCG/ML kit Inject  as directed. Take injection once a month     • glucose blood (FREESTYLE LITE) test strip Check once daily E11.9 100 each 1   • hydrOXYzine (ATARAX) 25 MG tablet Take 1 tablet by mouth 3 (Three) Times a Day As Needed for Itching. 30 tablet 5   • Lancets (freestyle) lancets Use once daily as instructed for blood sugar testing 100 each 3   • lisinopril (PRINIVIL,ZESTRIL) 10 MG tablet Take 1 tablet by mouth Daily. 30 tablet 5   • metFORMIN (GLUCOPHAGE) 1000 MG tablet Take 1 tablet by mouth 2 (Two) Times a Day With Meals. 180 tablet 1   • Multiple Vitamins-Minerals (ICAPS AREDS 2 PO) Take 1 capsule by mouth Daily.     • nitroglycerin (NITROSTAT) 0.4 MG SL tablet Place 1 tablet under the tongue As Needed for Chest Pain. Take no more than 3 doses in 15 minutes. 25 tablet 5   • promethazine (PHENERGAN) 25 MG tablet Take 1 tablet by mouth Every 8 (Eight) Hours As Needed for Nausea or Vomiting. 30 tablet 1   • ticagrelor (BRILINTA) 60 MG tablet tablet Take 60 mg by mouth 2 (Two) Times a Day.     • [DISCONTINUED] atorvastatin (LIPITOR) 10 MG tablet Take 1 tablet by mouth once daily 90 tablet 0   • [DISCONTINUED] hydrOXYzine (ATARAX) 25 MG tablet TAKE 1 TABLET BY MOUTH THREE TIMES DAILY AS NEEDED FOR ITCHING 90 tablet 1   • [DISCONTINUED] lisinopril (PRINIVIL,ZESTRIL) 10 MG  tablet Take 10 mg by mouth Daily.     • [DISCONTINUED] promethazine (PHENERGAN) 25 MG tablet TAKE 1 TABLET BY MOUTH EVERY 8 HOURS AS NEEDED FOR NAUSEA AND VOMITING 30 tablet 1   • [DISCONTINUED] ticagrelor (BRILINTA) 90 MG tablet tablet Take 90 mg by mouth 2 (Two) Times a Day.       Facility-Administered Encounter Medications as of 8/10/2022   Medication Dose Route Frequency Provider Last Rate Last Admin   • cyanocobalamin injection 1,000 mcg  1,000 mcg Intramuscular Q28 Days Kishore Grande MD   1,000 mcg at 08/10/22 1104       Reviewed use of high risk medication in the elderly: yes  Reviewed for potential of harmful drug interactions in the elderly: yes    Follow Up:  Return in about 6 months (around 2/10/2023) for follow up.     There are no Patient Instructions on file for this visit.    An After Visit Summary and PPPS with all of these plans were given to the patient.        Transcribed from ambient dictation for Kishore Grande MD by BERTO ALANIZ.  08/10/22   11:18 EDT    Patient verbalized consent to the visit recording.

## 2022-09-12 ENCOUNTER — TELEPHONE (OUTPATIENT)
Dept: INTERNAL MEDICINE | Facility: CLINIC | Age: 87
End: 2022-09-12

## 2022-09-12 RX ORDER — FLUCONAZOLE 100 MG/1
100 TABLET ORAL DAILY
Qty: 7 TABLET | Refills: 0 | Status: SHIPPED | OUTPATIENT
Start: 2022-09-12 | End: 2023-02-23

## 2022-09-12 RX ORDER — NYSTATIN 100000 [USP'U]/G
POWDER TOPICAL 4 TIMES DAILY
Qty: 60 G | Refills: 1 | Status: SHIPPED | OUTPATIENT
Start: 2022-09-12 | End: 2023-09-12

## 2022-09-21 ENCOUNTER — CLINICAL SUPPORT (OUTPATIENT)
Dept: INTERNAL MEDICINE | Facility: CLINIC | Age: 87
End: 2022-09-21

## 2022-09-21 DIAGNOSIS — E53.8 B12 DEFICIENCY: ICD-10-CM

## 2022-09-21 PROCEDURE — 96372 THER/PROPH/DIAG INJ SC/IM: CPT | Performed by: INTERNAL MEDICINE

## 2022-09-21 RX ADMIN — CYANOCOBALAMIN 1000 MCG: 1000 INJECTION, SOLUTION INTRAMUSCULAR; SUBCUTANEOUS at 13:11

## 2022-10-03 ENCOUNTER — TRANSCRIBE ORDERS (OUTPATIENT)
Dept: ADMINISTRATIVE | Facility: HOSPITAL | Age: 87
End: 2022-10-03

## 2022-10-03 DIAGNOSIS — Z12.31 ENCOUNTER FOR SCREENING MAMMOGRAM FOR MALIGNANT NEOPLASM OF BREAST: Primary | ICD-10-CM

## 2022-10-08 ENCOUNTER — HOSPITAL ENCOUNTER (OUTPATIENT)
Dept: MAMMOGRAPHY | Facility: HOSPITAL | Age: 87
Discharge: HOME | End: 2022-10-08
Admitting: INTERNAL MEDICINE

## 2022-10-08 DIAGNOSIS — Z12.31 ENCOUNTER FOR SCREENING MAMMOGRAM FOR MALIGNANT NEOPLASM OF BREAST: ICD-10-CM

## 2022-10-08 PROCEDURE — 77063 BREAST TOMOSYNTHESIS BI: CPT | Performed by: RADIOLOGY

## 2022-10-08 PROCEDURE — 77067 SCR MAMMO BI INCL CAD: CPT

## 2022-10-08 PROCEDURE — 77067 SCR MAMMO BI INCL CAD: CPT | Performed by: RADIOLOGY

## 2022-10-08 PROCEDURE — 77063 BREAST TOMOSYNTHESIS BI: CPT

## 2022-10-24 ENCOUNTER — CLINICAL SUPPORT (OUTPATIENT)
Dept: INTERNAL MEDICINE | Facility: CLINIC | Age: 87
End: 2022-10-24

## 2022-10-24 DIAGNOSIS — Z23 NEED FOR INFLUENZA VACCINATION: Primary | ICD-10-CM

## 2022-10-24 DIAGNOSIS — E53.8 B12 DEFICIENCY: ICD-10-CM

## 2022-10-24 PROCEDURE — 90662 IIV NO PRSV INCREASED AG IM: CPT | Performed by: INTERNAL MEDICINE

## 2022-10-24 PROCEDURE — G0008 ADMIN INFLUENZA VIRUS VAC: HCPCS | Performed by: INTERNAL MEDICINE

## 2022-10-24 PROCEDURE — 96372 THER/PROPH/DIAG INJ SC/IM: CPT | Performed by: INTERNAL MEDICINE

## 2022-10-24 RX ADMIN — CYANOCOBALAMIN 1000 MCG: 1000 INJECTION, SOLUTION INTRAMUSCULAR; SUBCUTANEOUS at 13:46

## 2022-11-11 ENCOUNTER — TELEPHONE (OUTPATIENT)
Dept: INTERNAL MEDICINE | Facility: CLINIC | Age: 87
End: 2022-11-11

## 2022-11-11 DIAGNOSIS — E78.2 MIXED HYPERLIPIDEMIA: Primary | ICD-10-CM

## 2022-11-11 DIAGNOSIS — E11.21 DIABETIC NEPHROPATHY ASSOCIATED WITH TYPE 2 DIABETES MELLITUS: ICD-10-CM

## 2022-11-11 NOTE — TELEPHONE ENCOUNTER
Caller: Tessa Adler    Relationship: Self    Best call back number:[phone]     Who are you requesting to speak with (clinical staff, provider,  specific staff member):CLINICAL  What was the call regarding:APPOINTMENT CHANGE, DOES SHE NEED LAB WORK  Do you require a callback:YES

## 2022-11-16 ENCOUNTER — OFFICE VISIT (OUTPATIENT)
Dept: ORTHOPEDIC SURGERY | Facility: CLINIC | Age: 87
End: 2022-11-16

## 2022-11-16 VITALS
HEIGHT: 63 IN | SYSTOLIC BLOOD PRESSURE: 116 MMHG | WEIGHT: 136.8 LBS | DIASTOLIC BLOOD PRESSURE: 60 MMHG | BODY MASS INDEX: 24.24 KG/M2

## 2022-11-16 DIAGNOSIS — M17.11 PRIMARY OSTEOARTHRITIS OF RIGHT KNEE: Primary | ICD-10-CM

## 2022-11-16 DIAGNOSIS — M17.12 PRIMARY OSTEOARTHRITIS OF LEFT KNEE: ICD-10-CM

## 2022-11-16 DIAGNOSIS — F41.9 ANXIETY: ICD-10-CM

## 2022-11-16 PROCEDURE — 20610 DRAIN/INJ JOINT/BURSA W/O US: CPT | Performed by: ORTHOPAEDIC SURGERY

## 2022-11-16 PROCEDURE — 99204 OFFICE O/P NEW MOD 45 MIN: CPT | Performed by: ORTHOPAEDIC SURGERY

## 2022-11-16 RX ORDER — ALPRAZOLAM 0.5 MG/1
0.5 TABLET ORAL
COMMUNITY
End: 2023-02-23

## 2022-11-16 RX ORDER — ALPRAZOLAM 0.5 MG/1
0.5 TABLET ORAL 2 TIMES DAILY PRN
Qty: 60 TABLET | Refills: 3 | Status: SHIPPED | OUTPATIENT
Start: 2022-11-16

## 2022-11-16 RX ORDER — HYDROXYZINE HYDROCHLORIDE 25 MG/1
TABLET, FILM COATED ORAL
COMMUNITY
End: 2023-02-23

## 2022-11-16 RX ORDER — NEOMYCIN SULFATE, POLYMYXIN B SULFATE AND DEXAMETHASONE 3.5; 10000; 1 MG/ML; [USP'U]/ML; MG/ML
SUSPENSION/ DROPS OPHTHALMIC
COMMUNITY
Start: 2022-11-01 | End: 2023-02-23

## 2022-11-16 RX ORDER — NITROGLYCERIN 0.4 MG/1
0.4 TABLET SUBLINGUAL
COMMUNITY
Start: 2022-07-08 | End: 2023-02-23

## 2022-11-16 RX ORDER — NYSTATIN 100000 U/G
CREAM TOPICAL
COMMUNITY
Start: 2022-09-12 | End: 2023-02-23 | Stop reason: SDUPTHER

## 2022-11-16 RX ORDER — PROMETHAZINE HYDROCHLORIDE 25 MG/1
25 TABLET ORAL EVERY 6 HOURS PRN
COMMUNITY
End: 2023-02-23

## 2022-11-16 RX ORDER — METFORMIN HYDROCHLORIDE EXTENDED-RELEASE TABLETS 1000 MG/1
1000 TABLET, FILM COATED, EXTENDED RELEASE ORAL
COMMUNITY
End: 2022-11-16

## 2022-11-16 NOTE — TELEPHONE ENCOUNTER
Caller: Sincere[place]aon Tessa P    Relationship: Self    Best call back number: [phone]    Requested Prescriptions:   Requested Prescriptions     Pending Prescriptions Disp Refills   • ALPRAZolam (XANAX) 0.5 MG tablet 60 tablet 3     Sig: Take 1 tablet by mouth 2 (Two) Times a Day As Needed for Anxiety. for anxiety   • metFORMIN (GLUCOPHAGE) 1000 MG tablet 180 tablet 1     Sig: Take 1 tablet by mouth 2 (Two) Times a Day With Meals.        Pharmacy where request should be sent:  [facility] [phone]    Additional details provided by patient: PATIENT HAS 1 DAY LEFT OF METFORMIN AND 5 DAYS LEFT OF XANAX    Does the patient have less than a 3 day supply:  [x] Yes  [] No    Lewis Chester Rep   11/16/22 08:27 EST

## 2022-11-16 NOTE — TELEPHONE ENCOUNTER
Rx Refill Note  Requested Prescriptions     Pending Prescriptions Disp Refills   • ALPRAZolam (XANAX) 0.5 MG tablet 60 tablet 3     Sig: Take 1 tablet by mouth 2 (Two) Times a Day As Needed for Anxiety. for anxiety   • metFORMIN (GLUCOPHAGE) 1000 MG tablet 180 tablet 1     Sig: Take 1 tablet by mouth 2 (Two) Times a Day With Meals.      Last office visit with prescribing clinician: 8/10/2022      Next office visit with prescribing clinician: 2/23/2023            Khalida Brown LPN  11/16/22, 09:05 EST

## 2022-11-16 NOTE — PROGRESS NOTES
[facility]    Subjective     Chief Complaint   Patient presents with   • Left Knee - Pain   • Right Knee - Pain        HPI    Tessa Adler is a 87 y.o. female who presents with new problem of: bilateral knee pain.  Onset: atraumatic and gradual in nature. The issue has been ongoing for 10 year(s). Pain is a 7/10 on the pain scale. Pain is described as aching and shooting. Associated symptoms include pain, swelling, popping, stiffness and giving way/buckling. The pain is worse with walking, standing, climbing stairs and sleeping; heat improve the pain. Previous treatments have included: physical therapy, viscosupplementation (last injection 2016) and steroid injection (last injection 2016).  She had viscosupplementation injections in the past (2016), and had good relief.  She is interested in injections at this point.    I have reviewed the following portions of the patient's history and agree with: History of Present Illness and Review of Systems    Patient Active Problem List   Diagnosis   • B12 deficiency   • Mixed hyperlipidemia   • GERD without esophagitis   • Primary hypertension   • ASHD (arteriosclerotic heart disease)   • Vitamin D deficiency   • CKD (chronic kidney disease), stage III ([facility])   • Annual physical exam   • BMI 25.0-25.9,adult   • Diabetic nephropathy associated with type 2 diabetes mellitus (HCC)   • Hyperlipidemia due to type 2 diabetes mellitus (HCC)   • Idiopathic osteoarthritis   • Insomnia   • Irritable bowel syndrome   • Medicare annual wellness visit, subsequent   • Osteoarthritis   • Postmenopausal osteoporosis   • Trigger finger of both hands   • Diabetic polyneuropathy associated with type 2 diabetes mellitus (HCC)   • Spondylolisthesis at L4-L5 level   • Yeast infection     Past Medical History:   Diagnosis Date   • ASHD (arteriosclerotic heart disease)    • B12 deficiency    • Rader's esophagus    • Benign essential hypertension    • CKD  (chronic kidney disease), stage III (HCC)    • Diabetes mellitus without complication (HCC)    • GERD without esophagitis    • Hemorrhoids    • History of colonic polyps    • Mixed hyperlipidemia    • Myocardial infarction (HCC)     NONSTEMI   • Stress-induced cardiomyopathy 02/27/2015   • Vitamin D deficiency       Past Surgical History:   Procedure Laterality Date   • CARPAL TUNNEL RELEASE  1989   • CATARACT EXTRACTION     • CHOLECYSTECTOMY  2001   • CORONARY ANGIOPLASTY WITH STENT PLACEMENT N/A 2010    PTCA, stents, LAD   • CORONARY ANGIOPLASTY WITH STENT PLACEMENT  2015   • CORONARY ANGIOPLASTY WITH STENT PLACEMENT  2020   • HEMORRHOIDECTOMY  1965   • HYSTERECTOMY  1968    AGE 33   • OOPHORECTOMY Bilateral     AGE 33      Family History   Problem Relation Age of Onset   • Breast cancer Neg Hx    • Ovarian cancer Neg Hx      Social History     Socioeconomic History   • Marital status:    Tobacco Use   • Smoking status: Never   • Smokeless tobacco: Never   Substance and Sexual Activity   • Alcohol use: No   • Drug use: No   • Sexual activity: Defer      Current Outpatient Medications on File Prior to Visit   Medication Sig Dispense Refill   • ALPRAZolam (XANAX) 0.5 MG tablet Take 1 tablet by mouth 2 (Two) Times a Day As Needed for Anxiety. for anxiety 60 tablet 3   • ALPRAZolam (XANAX) 0.5 MG tablet Take 0.5 mg by mouth.     • aspirin 81 MG EC tablet Take 81 mg by mouth Daily. Take one daily     • atorvastatin (LIPITOR) 10 MG tablet Take 1 tablet by mouth Daily. 30 tablet 5   • carvedilol (COREG) 6.25 MG tablet Take 6.25 mg by mouth 2 (Two) Times a Day With Meals.     • Cholecalciferol (VITAMIN D3) 2000 units tablet Take  by mouth. Take one daily     • Cyanocobalamin 1000 MCG/ML kit Inject  as directed. Take injection once a month     • fluconazole (Diflucan) 100 MG tablet Take 1 tablet by mouth Daily. 7 tablet 0   • glucose blood (FREESTYLE LITE) test strip Check once daily E11.9 100 each 1   • hydrOXYzine  (ATARAX) 25 MG tablet Take 1 tablet by mouth 3 (Three) Times a Day As Needed for Itching. 30 tablet 5   • Lancets (freestyle) lancets Use once daily as instructed for blood sugar testing 100 each 3   • lisinopril (PRINIVIL,ZESTRIL) 10 MG tablet Take 1 tablet by mouth Daily. 30 tablet 5   • metFORMIN (GLUCOPHAGE) 1000 MG tablet Take 1 tablet by mouth 2 (Two) Times a Day With Meals. 180 tablet 1   • Multiple Vitamins-Minerals (ICAPS AREDS 2 PO) Take 1 capsule by mouth Daily.     • nitroglycerin (NITROSTAT) 0.4 MG SL tablet Place 1 tablet under the tongue As Needed for Chest Pain. Take no more than 3 doses in 15 minutes. 25 tablet 5   • nitroglycerin (NITROSTAT) 0.4 MG SL tablet Place 0.4 mg under the tongue.     • nystatin (MYCOSTATIN) 381478 UNIT/GM powder Apply  topically to the appropriate area as directed 4 (Four) Times a Day. 60 g 1   • promethazine (PHENERGAN) 25 MG tablet Take 1 tablet by mouth Every 8 (Eight) Hours As Needed for Nausea or Vomiting. 30 tablet 1   • ticagrelor (BRILINTA) 60 MG tablet tablet Take 60 mg by mouth 2 (Two) Times a Day.     • ticagrelor (BRILINTA) 60 MG tablet tablet Take 60 mg by mouth.     • Cholecalciferol 50 MCG (2000 UT) capsule Take 2,000 Units by mouth.     • cyanocobalamin (VITAMIN B-12) 1000 MCG tablet Take 1,000 mcg by mouth Daily.     • hydrOXYzine (ATARAX) 25 MG tablet Take  by mouth.     • metFORMIN (FORTAMET) 1000 MG (OSM) 24 hr tablet Take 1,000 mg by mouth.     • neomycin-polymyxin-dexamethasone (MAXITROL) 3.5-98276-7.1 ophthalmic suspension INSTILL 1 DROP INTO LEFT EYE THREE TIMES DAILY FOR 14 DAYS     • nystatin (MYCOSTATIN) 462590 UNIT/GM cream APPLY TOPICALLY TO AFFECTED AREA TWICE DAILY     • promethazine (PHENERGAN) 25 MG tablet Take 25 mg by mouth Every 6 (Six) Hours As Needed.       Current Facility-Administered Medications on File Prior to Visit   Medication Dose Route Frequency Provider Last Rate Last Admin   • cyanocobalamin injection 1,000 mcg  1,000 mcg  "Intramuscular Q28 Days Kishore Grande MD   1,000 mcg at 10/24/22 1346      Allergies   Allergen Reactions   • Iodinated Diagnostic Agents Unknown (See Comments), Other (See Comments) and Swelling     unknown  unknown  Tongue swelling  Contrast Dye    • Doxycycline Diarrhea and Nausea And Vomiting   • Ioversol Swelling     Tongue swelling  Contrast Dye    • Pantoprazole Sodium Unknown (See Comments)     Unknown  Protonix    • Perflutren Lipid Microsphere Unknown (See Comments)     Unknown  Definity    • Clarithromycin Unknown (See Comments) and Other (See Comments)     Unknown  Biaxin  Unknown  Biaxin   • Codeine Unknown (See Comments) and Other (See Comments)     unknown  unknown   • Dobutamine Unknown (See Comments), Nausea Only and Other (See Comments)     unknown  unknown   • Pantoprazole Other (See Comments) and Unknown (See Comments)     Unknown  Protonix    • Perflutren Lipid Microspheres Other (See Comments)     Unknown  Definity    • Sulfa Antibiotics Unknown (See Comments) and Other (See Comments)     Unknown  Bactrim  Unknown  Bactrim   • Sulfamethoxazole-Trimethoprim Unknown (See Comments)   • Trimethoprim Unknown (See Comments) and Other (See Comments)     Unknown  Bactrim  Unknown  Bactrim        Review of Systems   Musculoskeletal: Positive for arthralgias.   All other systems reviewed and are negative.       Objective      Physical Exam  /60   Ht 160 cm (62.99\")   Wt 62.1 kg (136 lb 12.8 oz)   BMI 24.24 kg/m²     Body mass index is 24.24 kg/m².    General:   Mental Status:  Alert   Appearance: Cooperative, in no acute distress   Build and Nutrition: Well-nourished well-developed female   Orientation: Alert and oriented to person, place and time   Posture: Normal   Gait: Limp on both lower extremities    Integument:   Right knee: no skin lesions, no rash, no ecchymosis   Left knee: no skin lesions, no rash, no ecchymosis    Neurologic:   Motor:  Right lower extremity: 5/5 quadriceps, " hamstrings, ankle dorsiflexors, and ankle plantar flexors  Left lower extremity: 5/5 quadriceps, hamstrings, ankle dorsiflexors, and ankle plantar flexors    Lower Extremities:   Right Knee:    Tenderness:  Medial/lateral joint line tenderness    Effusion:  None    Swelling:  None    Crepitus:  Positive    Atrophy:  None    Range of motion:  Extension: 0°       Flexion: 120°  Instability:  No varus laxity, no valgus laxity, negative anterior drawer  Deformities:  Valgus   Left Knee:    Tenderness:  Medial/lateral joint line tenderness    Effusion:  None    Swelling:  None    Crepitus: Positive    Atrophy:  None    Range of motion:  Extension: 0°       Flexion: 120°  Instability:  No varus laxity, no valgus laxity, negative anterior drawer  Deformities:  Mild valgus      Imaging/Studies      Imaging Results (Last 24 Hours)     Procedure Component Value Units Date/Time    XR Knee 4+ View Bilateral [[id number]] Resulted: 11/16/22 1013     Updated: 11/16/22 1014    Narrative:      Right Knee Radiographs  Indication: right knee pain  Views: Standing AP's and skiers of both knees, with lateral and sunrise   views of the right knee    Comparison: Nonweightbearing imaging from 5/22/2019    Findings:   Calcification within the medial and lateral menisci, with bone-on-bone   contact and patellofemoral compartment, no acute bony abnormalities.  No   unusual bony features.  Worsening compared to the previous imaging.    Left Knee Radiographs  Indication: left knee pain  Views: Standing AP's and skiers of both knees, with lateral and sunrise   views of the left knee    Comparison: no prior studies available    Findings:   Medial joint space narrowing, no acute bony abnormalities, mild   patellofemoral degeneration, no unusual bony features.          Assessment and Plan     Diagnoses and all orders for this visit:    1. Primary osteoarthritis of right knee (Primary)  -     XR Knee 4+ View Bilateral  -     - Large Joint  Arthrocentesis: bilateral knee    2. Primary osteoarthritis of left knee  -     - Large Joint Arthrocentesis: bilateral knee        1. Primary osteoarthritis of right knee    2. Primary osteoarthritis of left knee        I reviewed my findings with the patient.  She has bilateral knee arthritis, we discussed treatment options.  She responded well to viscosupplementation injections in the past, and would like a series at this point.  We will submit for approval.    Addendum: Injections were approved today.    Procedure Note:  The potential benefits of performing a therapeutic knee joint visco supplementation injection, as well as potential risks (including, but not limited to infection, swelling, pain, bleeding, bruising, nerve/blood vessel damage, and pseudoseptic reaction) have been discussed with the patient.  After informed consent, timeout procedure was performed, and the skin on the right and left knee was prepped with chlorhexidine soap and alcohol, after which ethyl chloride was applied to the skin at the injection site. Via the anterolateral approach, Orthovisc was injected into the knee joint.  The patient tolerated the procedure well. There were no complications.  Band-Aid was applied to the injection site. Post-procedural instructions discussed with the patient and/or their caregiver.      Return in about 1 week (around 11/23/2022) for Injection.      Scott Coronado MD  11/16/22  10:53 EST

## 2022-11-16 NOTE — PROGRESS NOTES
Procedure   - Large Joint Arthrocentesis: bilateral knee on 11/16/2022 10:25 AM  Indications: pain  Details: (23g) needle, anterolateral approach  Medications (Right): 30 mg Hyaluronan 30 MG/2ML  Medications (Left): 30 mg Hyaluronan 30 MG/2ML  Outcome: tolerated well, no immediate complications  Procedure, treatment alternatives, risks and benefits explained, specific risks discussed. Consent was given by the patient. Immediately prior to procedure a time out was called to verify the correct patient, procedure, equipment, support staff and site/side marked as required. Patient was prepped and draped in the usual sterile fashion.

## 2022-11-23 ENCOUNTER — CLINICAL SUPPORT (OUTPATIENT)
Dept: INTERNAL MEDICINE | Facility: CLINIC | Age: 87
End: 2022-11-23

## 2022-11-23 ENCOUNTER — CLINICAL SUPPORT (OUTPATIENT)
Dept: ORTHOPEDIC SURGERY | Facility: CLINIC | Age: 87
End: 2022-11-23

## 2022-11-23 DIAGNOSIS — M17.12 PRIMARY OSTEOARTHRITIS OF LEFT KNEE: ICD-10-CM

## 2022-11-23 DIAGNOSIS — M17.11 PRIMARY OSTEOARTHRITIS OF RIGHT KNEE: Primary | ICD-10-CM

## 2022-11-23 DIAGNOSIS — E53.8 B12 DEFICIENCY: ICD-10-CM

## 2022-11-23 PROCEDURE — 20610 DRAIN/INJ JOINT/BURSA W/O US: CPT | Performed by: ORTHOPAEDIC SURGERY

## 2022-11-23 PROCEDURE — 96372 THER/PROPH/DIAG INJ SC/IM: CPT | Performed by: INTERNAL MEDICINE

## 2022-11-23 RX ADMIN — CYANOCOBALAMIN 1000 MCG: 1000 INJECTION, SOLUTION INTRAMUSCULAR; SUBCUTANEOUS at 09:04

## 2022-11-23 NOTE — PROGRESS NOTES
[facility]    Subjective     Chief Complaint   Patient presents with   • Follow-up     1 week recheck - #2 Bilateral knee orthovisc injections - Primary osteoarthritis of left knee         HPI    Tessa Adler is a 87 y.o. female who presents for the second Orthovisc injections into both knees.  Of note, she has seen improvement so far.    Patient Active Problem List   Diagnosis   • B12 deficiency   • Mixed hyperlipidemia   • GERD without esophagitis   • Primary hypertension   • ASHD (arteriosclerotic heart disease)   • Vitamin D deficiency   • CKD (chronic kidney disease), stage III ([facility])   • Annual physical exam   • BMI 25.0-25.9,adult   • Diabetic nephropathy associated with type 2 diabetes mellitus (HCC)   • Hyperlipidemia due to type 2 diabetes mellitus ([facility])   • Idiopathic osteoarthritis   • Insomnia   • Irritable bowel syndrome   • Medicare annual wellness visit, subsequent   • Osteoarthritis   • Postmenopausal osteoporosis   • Trigger finger of both hands   • Diabetic polyneuropathy associated with type 2 diabetes mellitus ([facility])   • Spondylolisthesis at L4-L5 level   • Yeast infection     Past Medical History:   Diagnosis Date   • ASHD (arteriosclerotic heart disease)    • B12 deficiency    • Rader's esophagus    • Benign essential hypertension    • CKD (chronic kidney disease), stage III (HCC)    • Diabetes mellitus without complication ([facility])    • GERD without esophagitis    • Hemorrhoids    • History of colonic polyps    • Mixed hyperlipidemia    • Myocardial infarction ([facility])     NONSTEMI   • Stress-induced cardiomyopathy 02/27/2015   • Vitamin D deficiency       Past Surgical History:   Procedure Laterality Date   • CARPAL TUNNEL RELEASE  1989   • CATARACT EXTRACTION     • CHOLECYSTECTOMY  2001   • CORONARY ANGIOPLASTY WITH STENT PLACEMENT N/A 2010    PTCA, stents, LAD   • CORONARY ANGIOPLASTY WITH STENT PLACEMENT  2015   • CORONARY ANGIOPLASTY WITH STENT PLACEMENT   2020   • HEMORRHOIDECTOMY  1965   • HYSTERECTOMY  1968    AGE 33   • OOPHORECTOMY Bilateral     AGE 33      Family History   Problem Relation Age of Onset   • Breast cancer Neg Hx    • Ovarian cancer Neg Hx      Social History     Socioeconomic History   • Marital status:    Tobacco Use   • Smoking status: Never   • Smokeless tobacco: Never   Substance and Sexual Activity   • Alcohol use: No   • Drug use: No   • Sexual activity: Defer      Current Outpatient Medications on File Prior to Visit   Medication Sig Dispense Refill   • ALPRAZolam (XANAX) 0.5 MG tablet Take 1 tablet by mouth 2 (Two) Times a Day As Needed for Anxiety. for anxiety 60 tablet 3   • ALPRAZolam (XANAX) 0.5 MG tablet Take 0.5 mg by mouth.     • aspirin 81 MG EC tablet Take 81 mg by mouth Daily. Take one daily     • atorvastatin (LIPITOR) 10 MG tablet Take 1 tablet by mouth Daily. 30 tablet 5   • carvedilol (COREG) 6.25 MG tablet Take 6.25 mg by mouth 2 (Two) Times a Day With Meals.     • Cholecalciferol (VITAMIN D3) 2000 units tablet Take  by mouth. Take one daily     • Cholecalciferol 50 MCG (2000 UT) capsule Take 2,000 Units by mouth.     • cyanocobalamin (VITAMIN B-12) 1000 MCG tablet Take 1,000 mcg by mouth Daily.     • Cyanocobalamin 1000 MCG/ML kit Inject  as directed. Take injection once a month     • fluconazole (Diflucan) 100 MG tablet Take 1 tablet by mouth Daily. 7 tablet 0   • glucose blood (FREESTYLE LITE) test strip Check once daily E11.9 100 each 1   • hydrOXYzine (ATARAX) 25 MG tablet Take 1 tablet by mouth 3 (Three) Times a Day As Needed for Itching. 30 tablet 5   • hydrOXYzine (ATARAX) 25 MG tablet Take  by mouth.     • Lancets (freestyle) lancets Use once daily as instructed for blood sugar testing 100 each 3   • lisinopril (PRINIVIL,ZESTRIL) 10 MG tablet Take 1 tablet by mouth Daily. 30 tablet 5   • metFORMIN (GLUCOPHAGE) 1000 MG tablet Take 1 tablet by mouth 2 (Two) Times a Day With Meals. 180 tablet 1   • Multiple  Vitamins-Minerals (ICAPS AREDS 2 PO) Take 1 capsule by mouth Daily.     • neomycin-polymyxin-dexamethasone (MAXITROL) 3.5-15150-1.1 ophthalmic suspension INSTILL 1 DROP INTO LEFT EYE THREE TIMES DAILY FOR 14 DAYS     • nitroglycerin (NITROSTAT) 0.4 MG SL tablet Place 1 tablet under the tongue As Needed for Chest Pain. Take no more than 3 doses in 15 minutes. 25 tablet 5   • nitroglycerin (NITROSTAT) 0.4 MG SL tablet Place 0.4 mg under the tongue.     • nystatin (MYCOSTATIN) 250067 UNIT/GM cream APPLY TOPICALLY TO AFFECTED AREA TWICE DAILY     • nystatin (MYCOSTATIN) 045731 UNIT/GM powder Apply  topically to the appropriate area as directed 4 (Four) Times a Day. 60 g 1   • promethazine (PHENERGAN) 25 MG tablet Take 1 tablet by mouth Every 8 (Eight) Hours As Needed for Nausea or Vomiting. 30 tablet 1   • promethazine (PHENERGAN) 25 MG tablet Take 25 mg by mouth Every 6 (Six) Hours As Needed.     • ticagrelor (BRILINTA) 60 MG tablet tablet Take 60 mg by mouth 2 (Two) Times a Day.     • ticagrelor (BRILINTA) 60 MG tablet tablet Take 60 mg by mouth.       Current Facility-Administered Medications on File Prior to Visit   Medication Dose Route Frequency Provider Last Rate Last Admin   • cyanocobalamin injection 1,000 mcg  1,000 mcg Intramuscular Q28 Days Kishore Grande MD   1,000 mcg at 10/24/22 1346      Allergies   Allergen Reactions   • Contrast Dye Anaphylaxis   • Iodinated Diagnostic Agents Unknown (See Comments), Other (See Comments) and Swelling     unknown  unknown  Tongue swelling  Contrast Dye    • Doxycycline Diarrhea and Nausea And Vomiting   • Ioversol Swelling     Tongue swelling  Contrast Dye    • Pantoprazole Sodium Unknown (See Comments)     Unknown  Protonix    • Perflutren Lipid Microsphere Unknown (See Comments)     Unknown  Definity    • Clarithromycin Unknown (See Comments) and Other (See Comments)     Unknown  Biaxin  Unknown  Biaxin   • Codeine Unknown (See Comments) and Other (See  Comments)     unknown  unknown   • Dobutamine Unknown (See Comments), Nausea Only and Other (See Comments)     unknown  unknown   • Pantoprazole Other (See Comments) and Unknown (See Comments)     Unknown  Protonix    • Perflutren Lipid Microspheres Other (See Comments)     Unknown  Definity    • Sulfa Antibiotics Unknown (See Comments) and Other (See Comments)     Unknown  Bactrim  Unknown  Bactrim   • Sulfamethoxazole-Trimethoprim Unknown (See Comments)   • Trimethoprim Unknown (See Comments) and Other (See Comments)     Unknown  Bactrim  Unknown  Bactrim        Review of Systems   Constitutional: Negative for activity change, appetite change, chills, diaphoresis, fatigue, fever and unexpected weight change.   HENT: Negative for congestion, dental problem, drooling, ear discharge, ear pain, facial swelling, hearing loss, mouth sores, nosebleeds, postnasal drip, rhinorrhea, sinus pressure, sneezing, sore throat, tinnitus, trouble swallowing and voice change.    Eyes: Negative for photophobia, pain, discharge, redness, itching and visual disturbance.   Respiratory: Negative for apnea, cough, choking, chest tightness, shortness of breath, wheezing and stridor.    Cardiovascular: Negative for chest pain, palpitations and leg swelling.   Gastrointestinal: Negative for abdominal distention, abdominal pain, anal bleeding, blood in stool, constipation, diarrhea, nausea, rectal pain and vomiting.   Endocrine: Negative for cold intolerance, heat intolerance, polydipsia, polyphagia and polyuria.   Genitourinary: Negative for decreased urine volume, difficulty urinating, dysuria, enuresis, flank pain, frequency, genital sores, hematuria and urgency.   Musculoskeletal: Positive for arthralgias. Negative for back pain, gait problem, joint swelling, myalgias, neck pain and neck stiffness.   Skin: Negative for color change, pallor, rash and wound.   Allergic/Immunologic: Negative for environmental allergies, food allergies and  immunocompromised state.   Neurological: Negative for dizziness, tremors, seizures, syncope, facial asymmetry, speech difficulty, weakness, light-headedness, numbness and headaches.   Hematological: Negative for adenopathy. Does not bruise/bleed easily.   Psychiatric/Behavioral: Negative for agitation, behavioral problems, confusion, decreased concentration, dysphoric mood, hallucinations, self-injury, sleep disturbance and suicidal ideas. The patient is not nervous/anxious and is not hyperactive.         Objective      Physical Exam  There were no vitals taken for this visit.    There is no height or weight on file to calculate BMI.    General:   Mental Status:  Alert   Appearance: Cooperative, in no acute distress   Posture: Normal    Assessment and Plan     Diagnoses and all orders for this visit:    1. Primary osteoarthritis of right knee (Primary)  -     - Large Joint Arthrocentesis: bilateral knee    2. Primary osteoarthritis of left knee  -     - Large Joint Arthrocentesis: bilateral knee        1. Primary osteoarthritis of right knee    2. Primary osteoarthritis of left knee        Procedure Note:  The potential benefits of performing a therapeutic knee joint visco supplementation injection, as well as potential risks (including, but not limited to infection, swelling, pain, bleeding, bruising, nerve/blood vessel damage, and pseudoseptic reaction) have been discussed with the patient.  After informed consent, timeout procedure was performed, and the skin on the right and left knee was prepped with chlorhexidine soap and alcohol, after which ethyl chloride was applied to the skin at the injection site. Via the anterolateral approach, Orthovisc was injected into the knee joint.  The patient tolerated the procedure well. There were no complications.  Band-Aid was applied to the injection site. Post-procedural instructions discussed with the patient and/or their caregiver.    Return in about 1 week (around  11/30/2022) for Injection.    Scott Coronado MD  11/23/22  08:21 EST

## 2022-11-23 NOTE — PROGRESS NOTES
Procedure   - Large Joint Arthrocentesis: bilateral knee on 11/23/2022 8:09 AM  Indications: pain  Details: (23g) needle, anterolateral approach  Medications (Right): 30 mg Hyaluronan 30 MG/2ML  Medications (Left): 30 mg Hyaluronan 30 MG/2ML  Outcome: tolerated well, no immediate complications  Procedure, treatment alternatives, risks and benefits explained, specific risks discussed. Consent was given by the patient. Immediately prior to procedure a time out was called to verify the correct patient, procedure, equipment, support staff and site/side marked as required. Patient was prepped and draped in the usual sterile fashion.

## 2022-11-30 ENCOUNTER — CLINICAL SUPPORT (OUTPATIENT)
Dept: ORTHOPEDIC SURGERY | Facility: CLINIC | Age: 87
End: 2022-11-30

## 2022-11-30 DIAGNOSIS — M17.11 PRIMARY OSTEOARTHRITIS OF RIGHT KNEE: Primary | ICD-10-CM

## 2022-11-30 DIAGNOSIS — M17.12 PRIMARY OSTEOARTHRITIS OF LEFT KNEE: ICD-10-CM

## 2022-11-30 PROCEDURE — 20610 DRAIN/INJ JOINT/BURSA W/O US: CPT | Performed by: ORTHOPAEDIC SURGERY

## 2022-11-30 NOTE — PROGRESS NOTES
[facility] Note    Subjective     Chief Complaint   Patient presents with   • Follow-up     Bilateral knees orthovisc #3 injections        HPI    Tessa Adler is a 87 y.o. female who presents for the third and final Orthovisc injections into both knees.  Of note, she has seen some relief.    Patient Active Problem List   Diagnosis   • B12 deficiency   • Mixed hyperlipidemia   • GERD without esophagitis   • Primary hypertension   • ASHD (arteriosclerotic heart disease)   • Vitamin D deficiency   • CKD (chronic kidney disease), stage III (HCC)   • Annual physical exam   • BMI 25.0-25.9,adult   • Diabetic nephropathy associated with type 2 diabetes mellitus (HCC)   • Hyperlipidemia due to type 2 diabetes mellitus (HCC)   • Idiopathic osteoarthritis   • Insomnia   • Irritable bowel syndrome   • Medicare annual wellness visit, subsequent   • Osteoarthritis   • Postmenopausal osteoporosis   • Trigger finger of both hands   • Diabetic polyneuropathy associated with type 2 diabetes mellitus (HCC)   • Spondylolisthesis at L4-L5 level   • Yeast infection     Past Medical History:   Diagnosis Date   • ASHD (arteriosclerotic heart disease)    • B12 deficiency    • Rader's esophagus    • Benign essential hypertension    • CKD (chronic kidney disease), stage III (HCC)    • Diabetes mellitus without complication (HCC)    • GERD without esophagitis    • Hemorrhoids    • History of colonic polyps    • Mixed hyperlipidemia    • Myocardial infarction ([facility])     NONSTEMI   • Stress-induced cardiomyopathy 02/27/2015   • Vitamin D deficiency       Past Surgical History:   Procedure Laterality Date   • CARPAL TUNNEL RELEASE  1989   • CATARACT EXTRACTION     • CHOLECYSTECTOMY  2001   • CORONARY ANGIOPLASTY WITH STENT PLACEMENT N/A 2010    PTCA, stents, LAD   • CORONARY ANGIOPLASTY WITH STENT PLACEMENT  2015   • CORONARY ANGIOPLASTY WITH STENT PLACEMENT  2020   • HEMORRHOIDECTOMY  1965   • HYSTERECTOMY   1968    AGE 33   • OOPHORECTOMY Bilateral     AGE 33      Family History   Problem Relation Age of Onset   • Breast cancer Neg Hx    • Ovarian cancer Neg Hx      Social History     Socioeconomic History   • Marital status:    Tobacco Use   • Smoking status: Never   • Smokeless tobacco: Never   Substance and Sexual Activity   • Alcohol use: No   • Drug use: No   • Sexual activity: Defer      Current Outpatient Medications on File Prior to Visit   Medication Sig Dispense Refill   • ALPRAZolam (XANAX) 0.5 MG tablet Take 1 tablet by mouth 2 (Two) Times a Day As Needed for Anxiety. for anxiety 60 tablet 3   • ALPRAZolam (XANAX) 0.5 MG tablet Take 0.5 mg by mouth.     • aspirin 81 MG EC tablet Take 81 mg by mouth Daily. Take one daily     • atorvastatin (LIPITOR) 10 MG tablet Take 1 tablet by mouth Daily. 30 tablet 5   • carvedilol (COREG) 6.25 MG tablet Take 6.25 mg by mouth 2 (Two) Times a Day With Meals.     • Cholecalciferol (VITAMIN D3) 2000 units tablet Take  by mouth. Take one daily     • Cholecalciferol 50 MCG (2000 UT) capsule Take 2,000 Units by mouth.     • cyanocobalamin (VITAMIN B-12) 1000 MCG tablet Take 1,000 mcg by mouth Daily.     • Cyanocobalamin 1000 MCG/ML kit Inject  as directed. Take injection once a month     • fluconazole (Diflucan) 100 MG tablet Take 1 tablet by mouth Daily. 7 tablet 0   • glucose blood (FREESTYLE LITE) test strip Check once daily E11.9 100 each 1   • hydrOXYzine (ATARAX) 25 MG tablet Take 1 tablet by mouth 3 (Three) Times a Day As Needed for Itching. 30 tablet 5   • hydrOXYzine (ATARAX) 25 MG tablet Take  by mouth.     • Lancets (freestyle) lancets Use once daily as instructed for blood sugar testing 100 each 3   • lisinopril (PRINIVIL,ZESTRIL) 10 MG tablet Take 1 tablet by mouth Daily. 30 tablet 5   • metFORMIN (GLUCOPHAGE) 1000 MG tablet Take 1 tablet by mouth 2 (Two) Times a Day With Meals. 180 tablet 1   • Multiple Vitamins-Minerals (ICAPS AREDS 2 PO) Take 1 capsule by  mouth Daily.     • neomycin-polymyxin-dexamethasone (MAXITROL) 3.5-52116-6.1 ophthalmic suspension INSTILL 1 DROP INTO LEFT EYE THREE TIMES DAILY FOR 14 DAYS     • nitroglycerin (NITROSTAT) 0.4 MG SL tablet Place 1 tablet under the tongue As Needed for Chest Pain. Take no more than 3 doses in 15 minutes. 25 tablet 5   • nitroglycerin (NITROSTAT) 0.4 MG SL tablet Place 0.4 mg under the tongue.     • nystatin (MYCOSTATIN) 076819 UNIT/GM cream APPLY TOPICALLY TO AFFECTED AREA TWICE DAILY     • nystatin (MYCOSTATIN) 125537 UNIT/GM powder Apply  topically to the appropriate area as directed 4 (Four) Times a Day. 60 g 1   • promethazine (PHENERGAN) 25 MG tablet Take 1 tablet by mouth Every 8 (Eight) Hours As Needed for Nausea or Vomiting. 30 tablet 1   • promethazine (PHENERGAN) 25 MG tablet Take 25 mg by mouth Every 6 (Six) Hours As Needed.     • ticagrelor (BRILINTA) 60 MG tablet tablet Take 60 mg by mouth 2 (Two) Times a Day.     • ticagrelor (BRILINTA) 60 MG tablet tablet Take 60 mg by mouth.       Current Facility-Administered Medications on File Prior to Visit   Medication Dose Route Frequency Provider Last Rate Last Admin   • cyanocobalamin injection 1,000 mcg  1,000 mcg Intramuscular Q28 Days Kishore Grande MD   1,000 mcg at 11/23/22 0904      Allergies   Allergen Reactions   • Contrast Dye Anaphylaxis   • Iodinated Diagnostic Agents Unknown (See Comments), Other (See Comments) and Swelling     unknown  unknown  Tongue swelling  Contrast Dye    • Doxycycline Diarrhea and Nausea And Vomiting   • Ioversol Swelling     Tongue swelling  Contrast Dye    • Pantoprazole Sodium Unknown (See Comments)     Unknown  Protonix    • Perflutren Lipid Microsphere Unknown (See Comments)     Unknown  Definity    • Clarithromycin Unknown (See Comments) and Other (See Comments)     Unknown  Biaxin  Unknown  Biaxin   • Codeine Unknown (See Comments) and Other (See Comments)     unknown  unknown   • Dobutamine Unknown (See  Comments), Nausea Only and Other (See Comments)     unknown  unknown   • Pantoprazole Other (See Comments) and Unknown (See Comments)     Unknown  Protonix    • Perflutren Lipid Microspheres Other (See Comments)     Unknown  Definity    • Sulfa Antibiotics Unknown (See Comments) and Other (See Comments)     Unknown  Bactrim  Unknown  Bactrim   • Sulfamethoxazole-Trimethoprim Unknown (See Comments)   • Trimethoprim Unknown (See Comments) and Other (See Comments)     Unknown  Bactrim  Unknown  Bactrim        Review of Systems   Constitutional: Negative for activity change, appetite change, chills, diaphoresis, fatigue, fever and unexpected weight change.   HENT: Negative for congestion, dental problem, drooling, ear discharge, ear pain, facial swelling, hearing loss, mouth sores, nosebleeds, postnasal drip, rhinorrhea, sinus pressure, sneezing, sore throat, tinnitus, trouble swallowing and voice change.    Eyes: Negative for photophobia, pain, discharge, redness, itching and visual disturbance.   Respiratory: Negative for apnea, cough, choking, chest tightness, shortness of breath, wheezing and stridor.    Cardiovascular: Negative for chest pain, palpitations and leg swelling.   Gastrointestinal: Negative for abdominal distention, abdominal pain, anal bleeding, blood in stool, constipation, diarrhea, nausea, rectal pain and vomiting.   Endocrine: Negative for cold intolerance, heat intolerance, polydipsia, polyphagia and polyuria.   Genitourinary: Negative for decreased urine volume, difficulty urinating, dysuria, enuresis, flank pain, frequency, genital sores, hematuria and urgency.   Musculoskeletal: Positive for arthralgias. Negative for back pain, gait problem, joint swelling, myalgias, neck pain and neck stiffness.   Skin: Negative for color change, pallor, rash and wound.   Allergic/Immunologic: Negative for environmental allergies, food allergies and immunocompromised state.   Neurological: Negative for  dizziness, tremors, seizures, syncope, facial asymmetry, speech difficulty, weakness, light-headedness, numbness and headaches.   Hematological: Negative for adenopathy. Does not bruise/bleed easily.   Psychiatric/Behavioral: Negative for agitation, behavioral problems, confusion, decreased concentration, dysphoric mood, hallucinations, self-injury, sleep disturbance and suicidal ideas. The patient is not nervous/anxious and is not hyperactive.         Objective      Physical Exam  There were no vitals taken for this visit.    There is no height or weight on file to calculate BMI.    General:   Mental Status:  Alert   Appearance: Cooperative, in no acute distress   Posture: Normal    Assessment and Plan     Diagnoses and all orders for this visit:    1. Primary osteoarthritis of right knee (Primary)  -     - Large Joint Arthrocentesis: bilateral knee    2. Primary osteoarthritis of left knee  -     - Large Joint Arthrocentesis: bilateral knee        1. Primary osteoarthritis of right knee    2. Primary osteoarthritis of left knee        Procedure Note:  The potential benefits of performing a therapeutic knee joint visco supplementation injection, as well as potential risks (including, but not limited to infection, swelling, pain, bleeding, bruising, nerve/blood vessel damage, and pseudoseptic reaction) have been discussed with the patient.  After informed consent, timeout procedure was performed, and the skin on the right and left knee was prepped with chlorhexidine soap and alcohol, after which ethyl chloride was applied to the skin at the injection site. Via the anterolateral approach, Orthovisc was injected into the knee joint.  The patient tolerated the procedure well. There were no complications.  Band-Aid was applied to the injection site. Post-procedural instructions discussed with the patient and/or their caregiver.    Return in about 6 months (around 5/30/2023).    Scott Coronado MD  11/30/22  10:20  EST

## 2022-11-30 NOTE — PROGRESS NOTES
Procedure   - Large Joint Arthrocentesis: bilateral knee on 11/30/2022 9:55 AM  Indications: pain  Details: (23g) needle, anterolateral approach  Medications (Right): 30 mg Hyaluronan 30 MG/2ML  Medications (Left): 30 mg Hyaluronan 30 MG/2ML  Outcome: tolerated well, no immediate complications  Procedure, treatment alternatives, risks and benefits explained, specific risks discussed. Consent was given by the patient. Immediately prior to procedure a time out was called to verify the correct patient, procedure, equipment, support staff and site/side marked as required. Patient was prepped and draped in the usual sterile fashion.

## 2022-12-16 ENCOUNTER — CLINICAL SUPPORT (OUTPATIENT)
Dept: INTERNAL MEDICINE | Facility: CLINIC | Age: 87
End: 2022-12-16
Payer: MEDICARE

## 2022-12-16 DIAGNOSIS — E53.8 B12 DEFICIENCY: ICD-10-CM

## 2022-12-16 PROCEDURE — 96372 THER/PROPH/DIAG INJ SC/IM: CPT | Performed by: INTERNAL MEDICINE

## 2022-12-16 RX ADMIN — CYANOCOBALAMIN 1000 MCG: 1000 INJECTION, SOLUTION INTRAMUSCULAR; SUBCUTANEOUS at 12:48

## 2022-12-28 ENCOUNTER — TELEPHONE (OUTPATIENT)
Dept: INTERNAL MEDICINE | Facility: CLINIC | Age: 87
End: 2022-12-28

## 2022-12-28 NOTE — TELEPHONE ENCOUNTER
I spoke to patient and informed her that Dr. Grande had put in labs for her 2/23 appt on 11/11 22 so she just needs to go before her appointment to get them done and that she will need to be fasting. Patient verbalized understanding.

## 2022-12-28 NOTE — TELEPHONE ENCOUNTER
Caller: Tessa Adler    Relationship to patient: Self    Best call back number: [phone]    Patient is needing: PATIENT RECEIVED A LETTER DATED 12/16/22 ABOUT MISSED LABS. PATIENT STATES SHE DOES NOT KNOW WHY SHE RECEIVED THIS. PATIENT ALSO WOULD LIKE TO KNOW IF SHE NEEDS TO FAST FOR HER APPT 02/23/23

## 2023-01-10 ENCOUNTER — TELEPHONE (OUTPATIENT)
Dept: INTERNAL MEDICINE | Facility: CLINIC | Age: 88
End: 2023-01-10

## 2023-01-10 NOTE — TELEPHONE ENCOUNTER
Caller: Tessa Adler    Relationship to patient: Self    Best call back number: [phone]    Patient is needing: PATIENT IS HAVING A NUCLEAR TEST AND WANTING TO KNOW IF SHE NEEDS TO TAKE 1/2 OF HER METFORMIN OR NONE AT ALL? PATIENT WAS ADVISED TO CALL PCP

## 2023-02-13 ENCOUNTER — CLINICAL SUPPORT (OUTPATIENT)
Dept: INTERNAL MEDICINE | Facility: CLINIC | Age: 88
End: 2023-02-13
Payer: MEDICARE

## 2023-02-13 ENCOUNTER — LAB (OUTPATIENT)
Dept: LAB | Facility: HOSPITAL | Age: 88
End: 2023-02-13
Payer: MEDICARE

## 2023-02-13 DIAGNOSIS — E11.21 DIABETIC NEPHROPATHY ASSOCIATED WITH TYPE 2 DIABETES MELLITUS: ICD-10-CM

## 2023-02-13 DIAGNOSIS — E53.8 B12 DEFICIENCY: ICD-10-CM

## 2023-02-13 DIAGNOSIS — E78.2 MIXED HYPERLIPIDEMIA: ICD-10-CM

## 2023-02-13 LAB
ALBUMIN SERPL-MCNC: 4.3 G/DL (ref 3.5–5.2)
ALBUMIN/GLOB SERPL: 1.4 G/DL
ALP SERPL-CCNC: 70 U/L (ref 39–117)
ALT SERPL W P-5'-P-CCNC: 8 U/L (ref 1–33)
ANION GAP SERPL CALCULATED.3IONS-SCNC: 11 MMOL/L (ref 5–15)
AST SERPL-CCNC: 12 U/L (ref 1–32)
BILIRUB SERPL-MCNC: 0.2 MG/DL (ref 0–1.2)
BUN SERPL-MCNC: 24 MG/DL (ref 8–23)
BUN/CREAT SERPL: 20.5 (ref 7–25)
CALCIUM SPEC-SCNC: 9.9 MG/DL (ref 8.6–10.5)
CHLORIDE SERPL-SCNC: 108 MMOL/L (ref 98–107)
CHOLEST SERPL-MCNC: 217 MG/DL (ref 0–200)
CO2 SERPL-SCNC: 21 MMOL/L (ref 22–29)
CREAT SERPL-MCNC: 1.17 MG/DL (ref 0.57–1)
EGFRCR SERPLBLD CKD-EPI 2021: 45.8 ML/MIN/1.73
GLOBULIN UR ELPH-MCNC: 3.1 GM/DL
GLUCOSE SERPL-MCNC: 103 MG/DL (ref 65–99)
HBA1C MFR BLD: 6.9 % (ref 4.8–5.6)
HDLC SERPL-MCNC: 49 MG/DL (ref 40–60)
LDLC SERPL CALC-MCNC: 116 MG/DL (ref 0–100)
LDLC/HDLC SERPL: 2.2 {RATIO}
POTASSIUM SERPL-SCNC: 4.7 MMOL/L (ref 3.5–5.2)
PROT SERPL-MCNC: 7.4 G/DL (ref 6–8.5)
SODIUM SERPL-SCNC: 140 MMOL/L (ref 136–145)
TRIGL SERPL-MCNC: 301 MG/DL (ref 0–150)
VLDLC SERPL-MCNC: 52 MG/DL (ref 5–40)

## 2023-02-13 PROCEDURE — 83036 HEMOGLOBIN GLYCOSYLATED A1C: CPT

## 2023-02-13 PROCEDURE — 96372 THER/PROPH/DIAG INJ SC/IM: CPT | Performed by: INTERNAL MEDICINE

## 2023-02-13 PROCEDURE — 80061 LIPID PANEL: CPT

## 2023-02-13 PROCEDURE — 80053 COMPREHEN METABOLIC PANEL: CPT

## 2023-02-13 RX ORDER — CYANOCOBALAMIN 1000 UG/ML
1000 INJECTION, SOLUTION INTRAMUSCULAR; SUBCUTANEOUS
Status: SHIPPED | OUTPATIENT
Start: 2023-02-13

## 2023-02-13 RX ADMIN — CYANOCOBALAMIN 1000 MCG: 1000 INJECTION, SOLUTION INTRAMUSCULAR; SUBCUTANEOUS at 09:06

## 2023-02-17 RX ORDER — LANCETS 28 GAUGE
EACH MISCELLANEOUS
Qty: 100 EACH | Refills: 3 | Status: SHIPPED | OUTPATIENT
Start: 2023-02-17

## 2023-02-17 NOTE — TELEPHONE ENCOUNTER
Caller: Tessa Adler    Relationship: Self    Best call back number: [phone]    Requested Prescriptions:   Requested Prescriptions     Pending Prescriptions Disp Refills   • Lancets (freestyle) lancets 100 each 3     Sig: Use once daily as instructed for blood sugar testing   • glucose blood (FREESTYLE LITE) test strip 100 each 1     Sig: Check once daily E11.9        Pharmacy where request should be sent: [address] [phone] [facility] [phone] [facility]     Additional details provided by patient: SHE IS OUT OF THE TEST STRIPS. THIS IS FOR THE FREESTYLE LIGHT    Does the patient have less than a 3 day supply:  [x] Yes  [] No    Would you like a call back once the refill request has been completed: [x] Yes [] No    If the office needs to give you a call back, can they leave a voicemail: [x] Yes [] No    Bart Franco, PCT   02/17/23 09:24 EST

## 2023-02-23 ENCOUNTER — OFFICE VISIT (OUTPATIENT)
Dept: INTERNAL MEDICINE | Facility: CLINIC | Age: 88
End: 2023-02-23
Payer: MEDICARE

## 2023-02-23 VITALS
DIASTOLIC BLOOD PRESSURE: 68 MMHG | BODY MASS INDEX: 24.45 KG/M2 | HEART RATE: 76 BPM | TEMPERATURE: 97.7 F | WEIGHT: 138 LBS | HEIGHT: 63 IN | SYSTOLIC BLOOD PRESSURE: 110 MMHG

## 2023-02-23 DIAGNOSIS — E78.2 MIXED HYPERLIPIDEMIA: ICD-10-CM

## 2023-02-23 DIAGNOSIS — N18.31 STAGE 3A CHRONIC KIDNEY DISEASE: ICD-10-CM

## 2023-02-23 DIAGNOSIS — E11.21 DIABETIC NEPHROPATHY ASSOCIATED WITH TYPE 2 DIABETES MELLITUS: Primary | ICD-10-CM

## 2023-02-23 DIAGNOSIS — I25.10 ASHD (ARTERIOSCLEROTIC HEART DISEASE): ICD-10-CM

## 2023-02-23 DIAGNOSIS — I10 PRIMARY HYPERTENSION: ICD-10-CM

## 2023-02-23 PROCEDURE — 99214 OFFICE O/P EST MOD 30 MIN: CPT | Performed by: INTERNAL MEDICINE

## 2023-02-23 RX ORDER — HYDROXYZINE HYDROCHLORIDE 25 MG/1
25 TABLET, FILM COATED ORAL 3 TIMES DAILY PRN
Qty: 30 TABLET | Refills: 5 | Status: SHIPPED | OUTPATIENT
Start: 2023-02-23

## 2023-02-23 RX ORDER — NYSTATIN 100000 U/G
CREAM TOPICAL DAILY PRN
Qty: 30 G | Refills: 3 | Status: SHIPPED | OUTPATIENT
Start: 2023-02-23

## 2023-02-23 RX ORDER — EZETIMIBE 10 MG/1
10 TABLET ORAL DAILY
COMMUNITY
Start: 2023-01-09 | End: 2024-01-09

## 2023-02-23 RX ORDER — FLUCONAZOLE 150 MG/1
150 TABLET ORAL ONCE
Qty: 5 TABLET | Refills: 0 | Status: SHIPPED | OUTPATIENT
Start: 2023-02-23 | End: 2023-02-23

## 2023-02-23 RX ORDER — LISINOPRIL 10 MG/1
10 TABLET ORAL DAILY
Qty: 30 TABLET | Refills: 5 | Status: SHIPPED | OUTPATIENT
Start: 2023-02-23

## 2023-02-23 RX ORDER — CLOBETASOL PROPIONATE 0.46 MG/ML
1 SOLUTION TOPICAL 2 TIMES DAILY
COMMUNITY
Start: 2023-02-02

## 2023-02-23 NOTE — PROGRESS NOTES
[facility]     Tessa Adler  1935   [id number]      Patient Care Team:  Kishore Grande MD as PCP - General  Kishore Grande MD as PCP - Family Medicine    Chief Complaint::   Chief Complaint   Patient presents with   • Hypertension   • Hyperlipidemia   • Diabetes        HPI    The patient presents today for follow-up of type 2 diabetes mellitus, chronic kidney disease, hypertension, hyperlipidemia, and coronary artery disease.    Chest pain  The patient had a nuclear stress test performed. She has an appointment with her cardiologist on 02/28/2023. She has not received the report back for the nuclear stress test, however, she was informed that the results looked okay. She has pain in her ribs and muscles. She recently experienced severe angina and she thought she might need nitroglycerin. She had been going up and down the stairs prior to onset of her pain. She is not sure what will trigger her pain. She has been advised to reduce her activity level and not to push her garbage cans out to the street anymore.     Hyperlipidemia  The patient discontinued Lipitor 28 days ago. She was switched to Zetia. She is still experiencing myalgias in her bilateral lower extremities. Her triglycerides and low-density lipoprotein levels are elevated.    Bilateral lower extremity and back pain  The patient has injections administered in her bilateral knees. She experiences nocturnal leg cramps 4 to 5 times nightly. She has not taken Tylenol for months as it did not improve her back pain. She experiences constant back and hip pain.     Chronic Conditions:  Type 2 diabetes mellitus, chronic kidney disease, hypertension, hyperlipidemia, and coronary artery disease.    Patient Active Problem List   Diagnosis   • B12 deficiency   • Mixed hyperlipidemia   • GERD without esophagitis   • Primary hypertension   • ASHD (arteriosclerotic heart disease)   • Vitamin D deficiency   • CKD (chronic kidney  disease), stage III (HCC)   • Annual physical exam   • BMI 25.0-25.9,adult   • Diabetic nephropathy associated with type 2 diabetes mellitus (HCC)   • Hyperlipidemia due to type 2 diabetes mellitus (HCC)   • Idiopathic osteoarthritis   • Insomnia   • Irritable bowel syndrome   • Medicare annual wellness visit, subsequent   • Osteoarthritis   • Postmenopausal osteoporosis   • Trigger finger of both hands   • Diabetic polyneuropathy associated with type 2 diabetes mellitus (HCC)   • Spondylolisthesis at L4-L5 level   • Yeast infection        Past Medical History:   Diagnosis Date   • ASHD (arteriosclerotic heart disease)    • B12 deficiency    • Rader's esophagus    • Benign essential hypertension    • CKD (chronic kidney disease), stage III (HCC)    • Diabetes mellitus without complication (HCC)    • GERD without esophagitis    • Hemorrhoids    • History of colonic polyps    • Mixed hyperlipidemia    • Myocardial infarction ([facility])     NONSTEMI   • Stress-induced cardiomyopathy 02/27/2015   • Vitamin D deficiency        Past Surgical History:   Procedure Laterality Date   • CARPAL TUNNEL RELEASE  1989   • CATARACT EXTRACTION     • CHOLECYSTECTOMY  2001   • CORONARY ANGIOPLASTY WITH STENT PLACEMENT N/A 2010    PTCA, stents, LAD   • CORONARY ANGIOPLASTY WITH STENT PLACEMENT  2015   • CORONARY ANGIOPLASTY WITH STENT PLACEMENT  2020   • HEMORRHOIDECTOMY  1965   • HYSTERECTOMY  1968    AGE 33   • OOPHORECTOMY Bilateral     AGE 33       Family History   Problem Relation Age of Onset   • Breast cancer Neg Hx    • Ovarian cancer Neg Hx        Social History     Socioeconomic History   • Marital status:    Tobacco Use   • Smoking status: Never   • Smokeless tobacco: Never   Substance and Sexual Activity   • Alcohol use: No   • Drug use: No   • Sexual activity: Defer       Allergies   Allergen Reactions   • Contrast Dye (Echo Or Unknown Ct/Mr) Anaphylaxis   • Iodinated Contrast Media Unknown (See Comments), Other (See  Comments) and Swelling     unknown  unknown  Tongue swelling  Contrast Dye    • Doxycycline Diarrhea and Nausea And Vomiting   • Ioversol Swelling     Tongue swelling  Contrast Dye    • Pantoprazole Sodium Unknown (See Comments)     Unknown  Protonix    • Perflutren Lipid Microsphere Unknown (See Comments)     Unknown  Definity    • Clarithromycin Unknown (See Comments) and Other (See Comments)     Unknown  Biaxin  Unknown  Biaxin   • Codeine Unknown (See Comments) and Other (See Comments)     unknown  unknown   • Dobutamine Unknown (See Comments), Nausea Only and Other (See Comments)     unknown  unknown   • Pantoprazole Other (See Comments) and Unknown (See Comments)     Unknown  Protonix    • Perflutren Lipid Microspheres Other (See Comments)     Unknown  Definity    • Sulfa Antibiotics Unknown (See Comments) and Other (See Comments)     Unknown  Bactrim  Unknown  Bactrim   • Sulfamethoxazole-Trimethoprim Unknown (See Comments)   • Trimethoprim Unknown (See Comments) and Other (See Comments)     Unknown  Bactrim  Unknown  Bactrim         Current Outpatient Medications:   •  ALPRAZolam (XANAX) 0.5 MG tablet, Take 1 tablet by mouth 2 (Two) Times a Day As Needed for Anxiety. for anxiety (Patient taking differently: Take 0.5 mg by mouth At Night As Needed for Anxiety. for anxiety), Disp: 60 tablet, Rfl: 3  •  aspirin 81 MG EC tablet, Take 81 mg by mouth Daily. Take one daily, Disp: , Rfl:   •  carvedilol (COREG) 6.25 MG tablet, Take 6.25 mg by mouth 2 (Two) Times a Day With Meals., Disp: , Rfl:   •  Cholecalciferol (VITAMIN D3) 2000 units tablet, Take  by mouth. Take one daily, Disp: , Rfl:   •  clobetasol (TEMOVATE) 0.05 % external solution, Apply 1 application topically to the appropriate area as directed 2 (Two) Times a Day., Disp: , Rfl:   •  Cyanocobalamin 1000 MCG/ML kit, Inject  as directed. Take injection once a month, Disp: , Rfl:   •  ezetimibe (ZETIA) 10 MG tablet, Take 10 mg by mouth Daily., Disp: , Rfl:  "  •  glucose blood (FREESTYLE LITE) test strip, Check once daily E11.9, Disp: 100 each, Rfl: 1  •  hydrOXYzine (ATARAX) 25 MG tablet, Take 1 tablet by mouth 3 (Three) Times a Day As Needed for Itching., Disp: 30 tablet, Rfl: 5  •  Lancets (freestyle) lancets, Use once daily as instructed for blood sugar testing, Disp: 100 each, Rfl: 3  •  lisinopril (PRINIVIL,ZESTRIL) 10 MG tablet, Take 1 tablet by mouth Daily., Disp: 30 tablet, Rfl: 5  •  metFORMIN (GLUCOPHAGE) 1000 MG tablet, TAKE 1 TABLET BY MOUTH TWICE DAILY WITH MEALS, Disp: 180 tablet, Rfl: 0  •  Multiple Vitamins-Minerals (ICAPS AREDS 2 PO), Take 1 capsule by mouth Daily., Disp: , Rfl:   •  nitroglycerin (NITROSTAT) 0.4 MG SL tablet, Place 1 tablet under the tongue As Needed for Chest Pain. Take no more than 3 doses in 15 minutes., Disp: 25 tablet, Rfl: 5  •  nystatin (MYCOSTATIN) 945405 UNIT/GM cream, Apply  topically to the appropriate area as directed Daily As Needed (yeast infection)., Disp: 30 g, Rfl: 3  •  nystatin (MYCOSTATIN) 600179 UNIT/GM powder, Apply  topically to the appropriate area as directed 4 (Four) Times a Day., Disp: 60 g, Rfl: 1  •  promethazine (PHENERGAN) 25 MG tablet, Take 1 tablet by mouth Every 8 (Eight) Hours As Needed for Nausea or Vomiting. (Patient taking differently: Take 25 mg by mouth Every 6 (Six) Hours As Needed for Nausea or Vomiting.), Disp: 30 tablet, Rfl: 1  •  ticagrelor (BRILINTA) 60 MG tablet tablet, Take 60 mg by mouth., Disp: , Rfl:     Current Facility-Administered Medications:   •  cyanocobalamin injection 1,000 mcg, 1,000 mcg, Intramuscular, Q28 Days, Marcus Linares MD    Review of Systems     A review of systems was performed and positive findings are noted in the HPI.    Vital Signs  Vitals:    02/23/23 1414   BP: 110/68   BP Location: Left arm   Patient Position: Sitting   Cuff Size: Adult   Pulse: 76   Temp: 97.7 °F (36.5 °C)   Weight: 62.6 kg (138 lb)   Height: 160 cm (62.99\")   PainSc:   8   PainLoc: " Back  Comment: also hips       Physical Exam  Constitutional:       Appearance: Normal appearance. She is normal weight.   HENT:      Head: Normocephalic and atraumatic.   Cardiovascular:      Rate and Rhythm: Normal rate and regular rhythm.      Pulses: Normal pulses.      Heart sounds: Normal heart sounds.   Pulmonary:      Effort: Pulmonary effort is normal.      Breath sounds: Examination of the right-upper field reveals decreased breath sounds. Examination of the left-upper field reveals decreased breath sounds. Examination of the right-middle field reveals decreased breath sounds. Examination of the left-middle field reveals decreased breath sounds. Examination of the right-lower field reveals decreased breath sounds. Examination of the left-lower field reveals decreased breath sounds. Decreased breath sounds present.      Comments: There are slightly decreased breath sounds in both lungs.  Chest:      Comments: She has significant tenderness at the costochondral margins bilaterally.   Abdominal:      General: Abdomen is flat. Bowel sounds are normal.      Palpations: Abdomen is soft.   Musculoskeletal:         General: Normal range of motion.      Cervical back: Normal range of motion and neck supple.   Skin:     General: Skin is warm and dry.   Neurological:      General: No focal deficit present.      Mental Status: She is alert and oriented to person, place, and time. Mental status is at baseline.   Psychiatric:         Mood and Affect: Mood normal.         Behavior: Behavior normal.         Thought Content: Thought content normal.         Judgment: Judgment normal.          Procedures    ACE III MINI             Assessment/Plan:    Diagnoses and all orders for this visit:    1. Diabetic nephropathy associated with type 2 diabetes mellitus (HCC) (Primary)    2. Stage 3a chronic kidney disease (HCC)    3. Primary hypertension    4. Mixed hyperlipidemia    5. ASHD (arteriosclerotic heart disease)    Other  orders  -     hydrOXYzine (ATARAX) 25 MG tablet; Take 1 tablet by mouth 3 (Three) Times a Day As Needed for Itching.  Dispense: 30 tablet; Refill: 5  -     lisinopril (PRINIVIL,ZESTRIL) 10 MG tablet; Take 1 tablet by mouth Daily.  Dispense: 30 tablet; Refill: 5  -     nystatin (MYCOSTATIN) 789189 UNIT/GM cream; Apply  topically to the appropriate area as directed Daily As Needed (yeast infection).  Dispense: 30 g; Refill: 3  -     fluconazole (Diflucan) 150 MG tablet; Take 1 tablet by mouth 1 (One) Time for 1 dose.  Dispense: 5 tablet; Refill: 0        1. Type 2 diabetes mellitus  - Hemoglobin A1c is well controlled at 6.9 percent. Continue metformin and healthy diet.    2. Chronic kidney disease  - GFR is stable at 45. The treatment remains controlled of blood glucose, blood pressure, and avoidance of NSAIDs.    3. Hypertension  - Blood pressure is well controlled on carvedilol and lisinopril.    4. Hyperlipidemia  - Triglycerides are high. LDL is slightly high. She has been statin intolerant and is now on Zetia. I have suggested that she attempt to add atorvastatin every other day to Zetia, but she should stop it if she has myalgias.    5. Nocturnal leg cramps  - Suggested magnesium at night.    6. Costochondritis  - Unfortunately, she cannot take NSAIDs. She may use heat or topical agents.    7. Coronary artery disease  - Coronary artery disease is asymptomatic, having just passed a nuclear stress test. She is on aspirin, carvedilol, lisinopril, ticagrelor, and hopefully, atorvastatin.    Follow up in 6 months.    Plan of care reviewed with patient at the conclusion of today's visit. Education was provided regarding diagnosis, management, and any prescribed or recommended OTC medications.Patient verbalizes understanding of and agreement with management plan.         Kishore Grande MD       Transcribed from ambient dictation for Kishore Grande MD by Natty Lanza.  02/23/23   16:52  EST    Patient or patient representative verbalized consent to the visit recording.  I have personally performed the services described in this document as transcribed by the above individual, and it is both accurate and complete.

## 2023-03-10 ENCOUNTER — TELEPHONE (OUTPATIENT)
Dept: INTERNAL MEDICINE | Facility: CLINIC | Age: 88
End: 2023-03-10
Payer: MEDICARE

## 2023-03-10 RX ORDER — AZITHROMYCIN 250 MG/1
TABLET, FILM COATED ORAL
Qty: 6 TABLET | Refills: 0 | Status: SHIPPED | OUTPATIENT
Start: 2023-03-10

## 2023-03-10 NOTE — TELEPHONE ENCOUNTER
"  Caller: Tessa Adler \"Pat\"    Relationship: Self    Best call back number: [phone]    What was the call regarding: PATIENT IS SICK, WHEN SHE SWALLOWS HER EARS POP, HEAD IS FULL, COUGHING, FEELING TERRIBLE. PATIENT AND  ARE BOTH ILL, REQUESTING A Z-PACK OR COUGH MEDICAINE.     Do you require a callback: YES IF NEEDED          [address] - [phone] [facility] [phone]   [phone]  "

## 2023-05-08 ENCOUNTER — TELEPHONE (OUTPATIENT)
Dept: INTERNAL MEDICINE | Facility: CLINIC | Age: 88
End: 2023-05-08
Payer: MEDICARE

## 2023-05-08 RX ORDER — AZITHROMYCIN 250 MG/1
TABLET, FILM COATED ORAL
Qty: 6 TABLET | Refills: 0 | Status: SHIPPED | OUTPATIENT
Start: 2023-05-08

## 2023-05-08 NOTE — TELEPHONE ENCOUNTER
"  Caller: Tessa Adler \"Pat\"    Relationship: Self    Best call back number: [phone]     What medication are you requesting: ZPACK OR AN ANTIBIOTIC TO TREAT SYMPTOMS    What are your current symptoms: CHILLS, COLD SYMPTOMS, DRY COUGH, EAR POPPING WHEN SWALLOWING    How long have you been experiencing symptoms: FEW DAYS    Have you had these symptoms before:    [x] Yes  [] No    Have you been treated for these symptoms before:   [x] Yes  [] No    If a prescription is needed, what is your preferred pharmacy and phone number: [facility] [address] [place] [address] [address] [place] [address] [phone] [facility] [phone]      Additional notes:  PATIENT HAS CALLED REQUESTING IF AN ANTIBIOTIC CAN BE CALLED INTO PHARMACY TO TREAT ABOVE SYMPTOMS. PATIENT IS REQUESTING A CALL BACK EITHER WAY IF SOMETHING CAN BE CALLED INTO PHARMACY.      "

## 2023-07-28 RX ORDER — BLOOD-GLUCOSE METER
KIT MISCELLANEOUS
Qty: 100 EACH | Refills: 1 | Status: SHIPPED | OUTPATIENT
Start: 2023-07-28

## 2023-07-28 NOTE — TELEPHONE ENCOUNTER
"Caller: Tessa Adler \"Pat\"    Relationship: Self    Best call back number: [phone]     Requested Prescriptions:   Requested Prescriptions     Pending Prescriptions Disp Refills    glucose blood (FREESTYLE LITE) test strip 100 each 1     Sig: Check once daily E11.9        Pharmacy where request should be sent: [facility] [address] [place] [address] [phone] [facility] [phone]      Last office visit with prescribing clinician: 2/23/2023   Last telemedicine visit with prescribing clinician: Visit date not found   Next office visit with prescribing clinician: 8/30/2023     Additional details provided by patient: PHARMACY SENT A REQUEST LAST WEEK. THEY NEED SOMETHING FROM [facility] [employer] WILL PAY FOR IT.    Does the patient have less than a 3 day supply:  [x] Yes  [] No    Would you like a call back once the refill request has been completed: [] Yes [x] No    If the office needs to give you a call back, can they leave a voicemail: [] Yes [x] No    CONI Villegas   07/28/23 11:03 EDT   "

## 2023-08-30 ENCOUNTER — LAB (OUTPATIENT)
Dept: LAB | Facility: HOSPITAL | Age: 88
End: 2023-08-30
Payer: MEDICARE

## 2023-08-30 ENCOUNTER — OFFICE VISIT (OUTPATIENT)
Dept: INTERNAL MEDICINE | Facility: CLINIC | Age: 88
End: 2023-08-30
Payer: MEDICARE

## 2023-08-30 VITALS
DIASTOLIC BLOOD PRESSURE: 68 MMHG | TEMPERATURE: 96.9 F | SYSTOLIC BLOOD PRESSURE: 136 MMHG | WEIGHT: 132.2 LBS | HEART RATE: 80 BPM | HEIGHT: 63 IN | BODY MASS INDEX: 23.42 KG/M2

## 2023-08-30 DIAGNOSIS — Z00.00 MEDICARE ANNUAL WELLNESS VISIT, SUBSEQUENT: Primary | ICD-10-CM

## 2023-08-30 DIAGNOSIS — I10 PRIMARY HYPERTENSION: ICD-10-CM

## 2023-08-30 DIAGNOSIS — E55.9 VITAMIN D DEFICIENCY: ICD-10-CM

## 2023-08-30 DIAGNOSIS — M79.10 MYALGIA: ICD-10-CM

## 2023-08-30 DIAGNOSIS — M70.61 TROCHANTERIC BURSITIS OF RIGHT HIP: ICD-10-CM

## 2023-08-30 DIAGNOSIS — M81.0 POSTMENOPAUSAL OSTEOPOROSIS: ICD-10-CM

## 2023-08-30 DIAGNOSIS — I25.10 ASHD (ARTERIOSCLEROTIC HEART DISEASE): ICD-10-CM

## 2023-08-30 DIAGNOSIS — E78.2 MIXED HYPERLIPIDEMIA: ICD-10-CM

## 2023-08-30 DIAGNOSIS — N18.31 STAGE 3A CHRONIC KIDNEY DISEASE: ICD-10-CM

## 2023-08-30 DIAGNOSIS — E11.21 DIABETIC NEPHROPATHY ASSOCIATED WITH TYPE 2 DIABETES MELLITUS: ICD-10-CM

## 2023-08-30 DIAGNOSIS — E53.8 B12 DEFICIENCY: ICD-10-CM

## 2023-08-30 DIAGNOSIS — M19.90 IDIOPATHIC OSTEOARTHRITIS: ICD-10-CM

## 2023-08-30 DIAGNOSIS — K21.9 GERD WITHOUT ESOPHAGITIS: ICD-10-CM

## 2023-08-30 DIAGNOSIS — E11.42 DIABETIC POLYNEUROPATHY ASSOCIATED WITH TYPE 2 DIABETES MELLITUS: ICD-10-CM

## 2023-08-30 DIAGNOSIS — M70.62 TROCHANTERIC BURSITIS OF LEFT HIP: ICD-10-CM

## 2023-08-30 LAB
25(OH)D3 SERPL-MCNC: 27.4 NG/ML (ref 30–100)
ALBUMIN SERPL-MCNC: 4.4 G/DL (ref 3.5–5.2)
ALBUMIN UR-MCNC: 2 MG/DL
ALBUMIN/GLOB SERPL: 1.4 G/DL
ALP SERPL-CCNC: 61 U/L (ref 39–117)
ALT SERPL W P-5'-P-CCNC: 9 U/L (ref 1–33)
ANION GAP SERPL CALCULATED.3IONS-SCNC: 12.5 MMOL/L (ref 5–15)
AST SERPL-CCNC: 16 U/L (ref 1–32)
BASOPHILS # BLD AUTO: 0.1 10*3/MM3 (ref 0–0.2)
BASOPHILS NFR BLD AUTO: 1 % (ref 0–1.5)
BILIRUB SERPL-MCNC: 0.3 MG/DL (ref 0–1.2)
BUN SERPL-MCNC: 22 MG/DL (ref 8–23)
BUN/CREAT SERPL: 22.7 (ref 7–25)
CALCIUM SPEC-SCNC: 9.6 MG/DL (ref 8.6–10.5)
CHLORIDE SERPL-SCNC: 107 MMOL/L (ref 98–107)
CHOLEST SERPL-MCNC: 163 MG/DL (ref 0–200)
CO2 SERPL-SCNC: 20.5 MMOL/L (ref 22–29)
CREAT SERPL-MCNC: 0.97 MG/DL (ref 0.57–1)
CREAT UR-MCNC: 102.2 MG/DL
DEPRECATED RDW RBC AUTO: 42.5 FL (ref 37–54)
EGFRCR SERPLBLD CKD-EPI 2021: 57 ML/MIN/1.73
EOSINOPHIL # BLD AUTO: 0.35 10*3/MM3 (ref 0–0.4)
EOSINOPHIL NFR BLD AUTO: 3.5 % (ref 0.3–6.2)
ERYTHROCYTE [DISTWIDTH] IN BLOOD BY AUTOMATED COUNT: 14.4 % (ref 12.3–15.4)
ERYTHROCYTE [SEDIMENTATION RATE] IN BLOOD: 19 MM/HR (ref 0–30)
GLOBULIN UR ELPH-MCNC: 3.2 GM/DL
GLUCOSE SERPL-MCNC: 116 MG/DL (ref 65–99)
HBA1C MFR BLD: 6.8 % (ref 4.8–5.6)
HCT VFR BLD AUTO: 31.1 % (ref 34–46.6)
HDLC SERPL-MCNC: 56 MG/DL (ref 40–60)
HGB BLD-MCNC: 10.2 G/DL (ref 12–15.9)
IMM GRANULOCYTES # BLD AUTO: 0.05 10*3/MM3 (ref 0–0.05)
IMM GRANULOCYTES NFR BLD AUTO: 0.5 % (ref 0–0.5)
LDLC SERPL CALC-MCNC: 77 MG/DL (ref 0–100)
LDLC/HDLC SERPL: 1.27 {RATIO}
LYMPHOCYTES # BLD AUTO: 2.6 10*3/MM3 (ref 0.7–3.1)
LYMPHOCYTES NFR BLD AUTO: 26.2 % (ref 19.6–45.3)
MCH RBC QN AUTO: 27 PG (ref 26.6–33)
MCHC RBC AUTO-ENTMCNC: 32.8 G/DL (ref 31.5–35.7)
MCV RBC AUTO: 82.3 FL (ref 79–97)
MICROALBUMIN/CREAT UR: 19.6 MG/G
MONOCYTES # BLD AUTO: 0.62 10*3/MM3 (ref 0.1–0.9)
MONOCYTES NFR BLD AUTO: 6.2 % (ref 5–12)
NEUTROPHILS NFR BLD AUTO: 6.22 10*3/MM3 (ref 1.7–7)
NEUTROPHILS NFR BLD AUTO: 62.6 % (ref 42.7–76)
NRBC BLD AUTO-RTO: 0.1 /100 WBC (ref 0–0.2)
PLATELET # BLD AUTO: 335 10*3/MM3 (ref 140–450)
PMV BLD AUTO: 9.9 FL (ref 6–12)
POTASSIUM SERPL-SCNC: 4.4 MMOL/L (ref 3.5–5.2)
PROT SERPL-MCNC: 7.6 G/DL (ref 6–8.5)
RBC # BLD AUTO: 3.78 10*6/MM3 (ref 3.77–5.28)
SODIUM SERPL-SCNC: 140 MMOL/L (ref 136–145)
TRIGL SERPL-MCNC: 180 MG/DL (ref 0–150)
VIT B12 BLD-MCNC: 273 PG/ML (ref 211–946)
VLDLC SERPL-MCNC: 30 MG/DL (ref 5–40)
WBC NRBC COR # BLD: 9.94 10*3/MM3 (ref 3.4–10.8)

## 2023-08-30 PROCEDURE — 83036 HEMOGLOBIN GLYCOSYLATED A1C: CPT

## 2023-08-30 PROCEDURE — 82570 ASSAY OF URINE CREATININE: CPT

## 2023-08-30 PROCEDURE — 85025 COMPLETE CBC W/AUTO DIFF WBC: CPT

## 2023-08-30 PROCEDURE — 80053 COMPREHEN METABOLIC PANEL: CPT

## 2023-08-30 PROCEDURE — 85652 RBC SED RATE AUTOMATED: CPT

## 2023-08-30 PROCEDURE — 80061 LIPID PANEL: CPT

## 2023-08-30 PROCEDURE — 82306 VITAMIN D 25 HYDROXY: CPT

## 2023-08-30 PROCEDURE — 82043 UR ALBUMIN QUANTITATIVE: CPT

## 2023-08-30 PROCEDURE — 82607 VITAMIN B-12: CPT

## 2023-08-30 RX ORDER — ERYTHROMYCIN 5 MG/G
1 OINTMENT OPHTHALMIC 2 TIMES DAILY
COMMUNITY
Start: 2023-08-10

## 2023-08-30 RX ORDER — TRIAMCINOLONE ACETONIDE 40 MG/ML
40 INJECTION, SUSPENSION INTRA-ARTICULAR; INTRAMUSCULAR ONCE
Status: COMPLETED | OUTPATIENT
Start: 2023-08-30 | End: 2023-08-30

## 2023-08-30 RX ORDER — LISINOPRIL 10 MG/1
10 TABLET ORAL DAILY
Qty: 90 TABLET | Refills: 3 | Status: SHIPPED | OUTPATIENT
Start: 2023-08-30

## 2023-08-30 RX ADMIN — TRIAMCINOLONE ACETONIDE 40 MG: 40 INJECTION, SUSPENSION INTRA-ARTICULAR; INTRAMUSCULAR at 12:23

## 2023-08-30 RX ADMIN — TRIAMCINOLONE ACETONIDE 40 MG: 40 INJECTION, SUSPENSION INTRA-ARTICULAR; INTRAMUSCULAR at 12:22

## 2023-08-30 RX ADMIN — TRIAMCINOLONE ACETONIDE 40 MG: 40 INJECTION, SUSPENSION INTRA-ARTICULAR; INTRAMUSCULAR at 12:21

## 2023-08-30 NOTE — PROGRESS NOTES
QUICK REFERENCE INFORMATION:  The ABCs of the Annual Wellness Visit    Subsequent Medicare Wellness Visit    HEALTH RISK ASSESSMENT    1935    Recent Hospitalizations:  No hospitalization(s) within the last year..        Current Medical Providers:  Patient Care Team:  Kishore Grande MD as PCP - General  Kishore Grande MD as PCP - Family Medicine        Smoking Status:  Social History     Tobacco Use   Smoking Status Never   Smokeless Tobacco Never       Alcohol Consumption:  Social History     Substance and Sexual Activity   Alcohol Use No       Depression Screen:       2023     9:25 AM   PHQ-2/PHQ-9 Depression Screening   Little Interest or Pleasure in Doing Things 0-->not at all   Feeling Down, Depressed or Hopeless 1-->several days   PHQ-9: Brief Depression Severity Measure Score 1       Health Habits and Functional and Cognitive Screenin/30/2023     9:24 AM   Functional & Cognitive Status   Do you have difficulty preparing food and eating? No   Do you have difficulty bathing yourself, getting dressed or grooming yourself? No   Do you have difficulty using the toilet? No   Do you have difficulty moving around from place to place? No   Do you have trouble with steps or getting out of a bed or a chair? No   Current Diet Well Balanced Diet   Dental Exam Not up to date   Eye Exam Up to date   Exercise (times per week) 0 times per week   Current Exercises Include No Regular Exercise   Do you need help using the phone?  No   Are you deaf or do you have serious difficulty hearing?  No   Do you need help to go to places out of walking distance? No   Do you need help shopping? No   Do you need help preparing meals?  No   Do you need help with housework?  No   Do you need help with laundry? No   Do you need help taking your medications? No   Do you need help managing money? No   Do you ever drive or ride in a car without wearing a seat belt? No   Have you felt unusual stress, anger or  loneliness in the last month? Yes   Who do you live with? Spouse   If you need help, do you have trouble finding someone available to you? No   Have you been bothered in the last four weeks by sexual problems? No   Do you have difficulty concentrating, remembering or making decisions? No       Fall Risk Screen:  LOREE Fall Risk Assessment was completed, and patient is at LOW risk for falls.Assessment completed on:8/30/2023    ACE III MINI        Does the patient have evidence of cognitive impairment? No    Aspirin use counseling: Taking ASA appropriately as indicated    Recent Lab Results:  CMP:  Lab Results   Component Value Date    BUN 24 (H) 02/13/2023    CREATININE 1.17 (H) 02/13/2023    EGFRIFNONA 52 (L) 02/02/2022    BCR 20.5 02/13/2023     02/13/2023    K 4.7 02/13/2023    CO2 21.0 (L) 02/13/2023    CALCIUM 9.9 02/13/2023    ALBUMIN 4.3 02/13/2023    BILITOT 0.2 02/13/2023    ALKPHOS 70 02/13/2023    AST 12 02/13/2023    ALT 8 02/13/2023     HbA1c:  Lab Results   Component Value Date    HGBA1C 6.90 (H) 02/13/2023    HGBA1C 6.90 (H) 08/10/2022     Microalbumin:  Lab Results   Component Value Date    MICROALBUR <1.2 08/10/2022     Lipid Panel  Lab Results   Component Value Date    CHOL 217 (H) 02/13/2023    TRIG 301 (H) 02/13/2023    HDL 49 02/13/2023     (H) 02/13/2023    AST 12 02/13/2023    ALT 8 02/13/2023       Visual Acuity:  No results found.    Age-appropriate Screening Schedule:  Refer to the list below for future screening recommendations based on patient's age, sex and/or medical conditions. Orders for these recommended tests are listed in the plan section. The patient has been provided with a written plan.    Health Maintenance   Topic Date Due    ZOSTER VACCINE (1 of 2) Never done    DXA SCAN  08/01/2018    COVID-19 Vaccine (4 - Pfizer series) 03/07/2022    ANNUAL WELLNESS VISIT  08/10/2023    URINE MICROALBUMIN  08/10/2023    HEMOGLOBIN A1C  08/13/2023    INFLUENZA VACCINE   10/01/2023    LIPID PANEL  02/13/2024    DIABETIC EYE EXAM  08/10/2024    TDAP/TD VACCINES (2 - Td or Tdap) 11/08/2027    Pneumococcal Vaccine 65+  Completed        Subjective   History of Present Illness    Tessa Adler is a 88 y.o. female who presents for a Subsequent Wellness Visit and for follow-up of diabetes, hypertension, hyperlipidemia, coronary artery disease, vitamin D deficiency, GERD, chronic kidney disease, osteoarthritis, and osteoporosis.    Bilateral hip pain  The patient complains of bilateral hip pain, right worse than left, that has been ongoing for 4 days. She states that the pain was so severe this morning that she cried. She is unsure of the cause of the pain. She states that her  believes that she injured herself while carrying a potty chair upstairs to carry to his bed. She has not slept in a bed for 5 months. She sleeps on the couch because her  could not get up the bed. She has to get up every 2 hours and empty his catheter and then she has to push him up in the bed. She has been sleeping in her bed for 5 days now. She can not sleep on her side or her back. She has a big knot on her hip that is painful. She has never had anything like this in the past. She has pain in her groin. Sometimes she can not walk due to the pain. She has tried heat and ice. She is taking Tylenol. She does not take any pain medication because they constipate her.    Chest pain  The patient has been having a lot of chest pains. She saw her cardiologist in 07/2023. She was told that she still has the pain. Last night, it was really bad. She was told that it was musculoskeletal. She has not taken the nitroglycerin because it makes her sick sometimes and it does give her a headache.    Diabetes  The patient had to pay for her strips.    CHRONIC CONDITIONS  Diabetes, hypertension, hyperlipidemia, coronary artery disease, vitamin D deficiency, GERD, chronic kidney disease, osteoarthritis, and  osteoporosis.    The following portions of the patient's history were reviewed and updated as appropriate: allergies, current medications, past family history, past medical history, past social history, past surgical history, and problem list.    Outpatient Medications Prior to Visit   Medication Sig Dispense Refill    ALPRAZolam (XANAX) 0.5 MG tablet Take 1 tablet by mouth 2 (Two) Times a Day As Needed for Anxiety. for anxiety 60 tablet 3    aspirin 81 MG EC tablet Take 1 tablet by mouth Daily. Take one daily      carvedilol (COREG) 6.25 MG tablet Take 1 tablet by mouth 2 (Two) Times a Day With Meals.      Cholecalciferol (VITAMIN D3) 2000 units tablet Take  by mouth. Take one daily      clobetasol (TEMOVATE) 0.05 % external solution Apply 1 application  topically to the appropriate area as directed 2 (Two) Times a Day.      erythromycin (ROMYCIN) 5 MG/GM ophthalmic ointment Administer 1 application  into the left eye 2 (Two) Times a Day.      ezetimibe (ZETIA) 10 MG tablet Take 1 tablet by mouth Daily.      glucose blood (FREESTYLE LITE) test strip Check once daily E11.9 100 each 1    hydrOXYzine (ATARAX) 25 MG tablet Take 1 tablet by mouth 3 (Three) Times a Day As Needed for Itching. 30 tablet 5    Lancets (freestyle) lancets Use once daily as instructed for blood sugar testing 100 each 3    Multiple Vitamins-Minerals (ICAPS AREDS 2 PO) Take 1 capsule by mouth Daily.      nitroglycerin (NITROSTAT) 0.4 MG SL tablet Place 1 tablet under the tongue As Needed for Chest Pain. Take no more than 3 doses in 15 minutes. 25 tablet 5    nystatin (MYCOSTATIN) 302300 UNIT/GM cream Apply  topically to the appropriate area as directed Daily As Needed (yeast infection). 30 g 3    nystatin (MYCOSTATIN) 354519 UNIT/GM powder Apply  topically to the appropriate area as directed 4 (Four) Times a Day. 60 g 1    promethazine (PHENERGAN) 25 MG tablet Take 1 tablet by mouth Every 8 (Eight) Hours As Needed for Nausea or Vomiting.  (Patient taking differently: Take 1 tablet by mouth Every 6 (Six) Hours As Needed for Nausea or Vomiting.) 30 tablet 1    ticagrelor (BRILINTA) 60 MG tablet tablet Take 1 tablet by mouth.      lisinopril (PRINIVIL,ZESTRIL) 10 MG tablet Take 1 tablet by mouth Daily. 30 tablet 5    metFORMIN (GLUCOPHAGE) 1000 MG tablet TAKE 1 TABLET BY MOUTH TWICE DAILY WITH MEALS 180 tablet 0    Cyanocobalamin 1000 MCG/ML kit Inject  as directed. Take injection once a month (Patient not taking: Reported on 8/30/2023)      azithromycin (Zithromax Z-Omari) 250 MG tablet Take 2 tablets the first day, then 1 tablet daily for 4 days. (Patient not taking: Reported on 8/30/2023) 6 tablet 0     Facility-Administered Medications Prior to Visit   Medication Dose Route Frequency Provider Last Rate Last Admin    cyanocobalamin injection 1,000 mcg  1,000 mcg Intramuscular Q28 Days Marcus Linares MD           Patient Active Problem List   Diagnosis    B12 deficiency    Mixed hyperlipidemia    GERD without esophagitis    Primary hypertension    ASHD (arteriosclerotic heart disease)    Vitamin D deficiency    CKD (chronic kidney disease), stage III    Annual physical exam    BMI 25.0-25.9,adult    Diabetic nephropathy associated with type 2 diabetes mellitus    Idiopathic osteoarthritis    Insomnia    Irritable bowel syndrome    Medicare annual wellness visit, subsequent    Osteoarthritis    Postmenopausal osteoporosis    Trigger finger of both hands    Diabetic polyneuropathy associated with type 2 diabetes mellitus    Spondylolisthesis at L4-L5 level    Yeast infection       Advance Care Planning:  ACP discussion was held with the patient during this visit. Patient has an advance directive (not in EMR), copy requested.    Identification of Risk Factors:  Risk factors include: Chronic Pain .    Review of Systems   Constitutional:  Negative for chills, fatigue and fever.   HENT:  Negative for congestion, ear pain and sinus pressure.     Respiratory:  Negative for cough, chest tightness, shortness of breath and wheezing.    Cardiovascular:  Negative for chest pain and palpitations.   Gastrointestinal:  Negative for abdominal pain, blood in stool and constipation.   Musculoskeletal:  Positive for back pain and myalgias.   Skin:  Negative for color change.   Allergic/Immunologic: Negative for environmental allergies.   Neurological:  Negative for dizziness, speech difficulty and headaches.   Psychiatric/Behavioral:  Negative for confusion. The patient is not nervous/anxious.      Compared to one year ago, the patient feels her physical health is worse.  Compared to one year ago, the patient feels her mental health is worse.    Objective     Physical Exam  Constitutional:       Appearance: Normal appearance.   HENT:      Head: Normocephalic and atraumatic.      Right Ear: Tympanic membrane and ear canal normal.      Left Ear: Tympanic membrane and ear canal normal.      Mouth/Throat:      Pharynx: Oropharynx is clear. No posterior oropharyngeal erythema.   Neck:      Vascular: No carotid bruit.   Cardiovascular:      Rate and Rhythm: Normal rate and regular rhythm.      Pulses:           Dorsalis pedis pulses are 1+ on the right side and 1+ on the left side.      Heart sounds: No murmur heard.  Pulmonary:      Effort: Pulmonary effort is normal.      Breath sounds: Normal breath sounds.   Abdominal:      General: Bowel sounds are normal.      Palpations: Abdomen is soft.      Tenderness: There is no abdominal tenderness.   Musculoskeletal:      Cervical back: Normal range of motion and neck supple.      Right foot: No deformity.      Left foot: No deformity.      Comments: There is tenderness to palpation over trochanteric bursas bilaterally.    Feet:      Right foot:      Protective Sensation: 5 sites tested.  5 sites sensed.      Skin integrity: Skin integrity normal.      Left foot:      Protective Sensation: 5 sites tested.  5 sites sensed.       "Skin integrity: Skin integrity normal.      Comments: Sensation in both feet absent to monofilament.     Diabetic Foot Exam Performed and Monofilament Test Performed    Skin:     General: Skin is warm and dry.   Neurological:      Mental Status: She is alert and oriented to person, place, and time.      Cranial Nerves: No cranial nerve deficit.   Psychiatric:         Mood and Affect: Mood normal.         Behavior: Behavior normal.   She has tenderness over the trochanters bilaterally. She also has tenderness to palpation over the anterior chest wall.     Procedures     Right trochanteric bursa injection:  Area over bursa cleansed with alcohol wipe.  1 ml triamcinolone, 1 ml lidocaine injected into bursa without problem.  Patient tolerated procedure well.     Left trochanteric bursa injection:  Area over bursa cleansed with alcohol wipe.  1 ml triamcinolone, 1 ml lidocaine injected into bursa without problem.  Patient tolerated procedure well.     Vitals:    08/30/23 0931   BP: 136/68   BP Location: Left arm   Patient Position: Sitting   Cuff Size: Adult   Pulse: 80   Temp: 96.9 øF (36.1 øC)   Weight: 60 kg (132 lb 3.2 oz)   Height: 160 cm (62.99\")   PainSc:   8   PainLoc: Hip  Comment: right       BMI is within normal parameters. No other follow-up for BMI required.      Assessment & Plan   Problem List Items Addressed This Visit          Cardiac and Vasculature    Mixed hyperlipidemia    Relevant Medications    ezetimibe (ZETIA) 10 MG tablet    Other Relevant Orders    Lipid Panel    Primary hypertension    Overview     Continue lisinopril 10 mg tablets daily.         Relevant Medications    carvedilol (COREG) 6.25 MG tablet    lisinopril (PRINIVIL,ZESTRIL) 10 MG tablet    Other Relevant Orders    CBC & Differential    ASHD (arteriosclerotic heart disease)    Relevant Medications    nitroglycerin (NITROSTAT) 0.4 MG SL tablet    carvedilol (COREG) 6.25 MG tablet    ticagrelor (BRILINTA) 60 MG tablet tablet       " Endocrine and Metabolic    B12 deficiency    Relevant Medications    cyanocobalamin injection 1,000 mcg    Other Relevant Orders    Vitamin B12    Vitamin D deficiency    Relevant Orders    Vitamin D,25-Hydroxy       Gastrointestinal Abdominal     GERD without esophagitis       Genitourinary and Reproductive     CKD (chronic kidney disease), stage III    Diabetic nephropathy associated with type 2 diabetes mellitus    Relevant Medications    metFORMIN (GLUCOPHAGE) 1000 MG tablet    Other Relevant Orders    Comprehensive Metabolic Panel    Hemoglobin A1c    Microalbumin / Creatinine Urine Ratio - Urine, Clean Catch       Health Encounters    Medicare annual wellness visit, subsequent - Primary       Musculoskeletal and Injuries    Idiopathic osteoarthritis    Postmenopausal osteoporosis       Neuro    Diabetic polyneuropathy associated with type 2 diabetes mellitus    Relevant Medications    metFORMIN (GLUCOPHAGE) 1000 MG tablet     Other Visit Diagnoses       Myalgia        Relevant Orders    Sedimentation Rate    Trochanteric bursitis of right hip        Relevant Medications    triamcinolone acetonide (KENALOG-40) injection 40 mg (Completed) (Start on 8/30/2023  1:15 PM)    Trochanteric bursitis of left hip        Relevant Medications    triamcinolone acetonide (KENALOG-40) injection 40 mg (Completed) (Start on 8/30/2023  1:15 PM)          1. 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 She is up to date on mammography and fully vaccinated against COVID-19.    2. Diabetes  Hemoglobin A1c is pending. Foot exam was performed. Continue metformin.    3. Hypertension  Blood pressure is controlled on lisinopril and carvedilol.    4. Hyperlipidemia  Lipid panel is pending. Continue ezetimibe and healthy diet.    5. Coronary artery disease  She will continue follow-up with her cardiologist. Her current pain appears atypical and due to chest wall inflammation, but she has  been in contact with cardiology.    6. GERD  GERD is currently controlled with diet.    7. Chronic kidney disease  GFR is pending. The treatment is controlled of blood pressure, blood glucose, and avoidance of NSAIDs.    8. Bursitis  Trochanteric bursas are injected bilaterally.    9. Myalgia  All of this could be to overuse due to taking care of her . We will obtain sedimentation rate to rule out evidence of polymyalgia.    Follow up in 6 months.    Patient Self-Management and Personalized Health Advice  The patient has been provided with information about: diet and exercise and preventive services including:   Annual Wellness Visit (AWV).    Outpatient Encounter Medications as of 8/30/2023   Medication Sig Dispense Refill    ALPRAZolam (XANAX) 0.5 MG tablet Take 1 tablet by mouth 2 (Two) Times a Day As Needed for Anxiety. for anxiety 60 tablet 3    aspirin 81 MG EC tablet Take 1 tablet by mouth Daily. Take one daily      carvedilol (COREG) 6.25 MG tablet Take 1 tablet by mouth 2 (Two) Times a Day With Meals.      Cholecalciferol (VITAMIN D3) 2000 units tablet Take  by mouth. Take one daily      clobetasol (TEMOVATE) 0.05 % external solution Apply 1 application  topically to the appropriate area as directed 2 (Two) Times a Day.      erythromycin (ROMYCIN) 5 MG/GM ophthalmic ointment Administer 1 application  into the left eye 2 (Two) Times a Day.      ezetimibe (ZETIA) 10 MG tablet Take 1 tablet by mouth Daily.      glucose blood (FREESTYLE LITE) test strip Check once daily E11.9 100 each 1    hydrOXYzine (ATARAX) 25 MG tablet Take 1 tablet by mouth 3 (Three) Times a Day As Needed for Itching. 30 tablet 5    Lancets (freestyle) lancets Use once daily as instructed for blood sugar testing 100 each 3    lisinopril (PRINIVIL,ZESTRIL) 10 MG tablet Take 1 tablet by mouth Daily. 90 tablet 3    metFORMIN (GLUCOPHAGE) 1000 MG tablet Take 1 tablet by mouth 2 (Two) Times a Day With Meals. 180 tablet 3    Multiple  Vitamins-Minerals (ICAPS AREDS 2 PO) Take 1 capsule by mouth Daily.      nitroglycerin (NITROSTAT) 0.4 MG SL tablet Place 1 tablet under the tongue As Needed for Chest Pain. Take no more than 3 doses in 15 minutes. 25 tablet 5    nystatin (MYCOSTATIN) 436224 UNIT/GM cream Apply  topically to the appropriate area as directed Daily As Needed (yeast infection). 30 g 3    nystatin (MYCOSTATIN) 120670 UNIT/GM powder Apply  topically to the appropriate area as directed 4 (Four) Times a Day. 60 g 1    promethazine (PHENERGAN) 25 MG tablet Take 1 tablet by mouth Every 8 (Eight) Hours As Needed for Nausea or Vomiting. (Patient taking differently: Take 1 tablet by mouth Every 6 (Six) Hours As Needed for Nausea or Vomiting.) 30 tablet 1    ticagrelor (BRILINTA) 60 MG tablet tablet Take 1 tablet by mouth.      [DISCONTINUED] lisinopril (PRINIVIL,ZESTRIL) 10 MG tablet Take 1 tablet by mouth Daily. 30 tablet 5    [DISCONTINUED] metFORMIN (GLUCOPHAGE) 1000 MG tablet TAKE 1 TABLET BY MOUTH TWICE DAILY WITH MEALS 180 tablet 0    Cyanocobalamin 1000 MCG/ML kit Inject  as directed. Take injection once a month (Patient not taking: Reported on 8/30/2023)      [DISCONTINUED] azithromycin (Zithromax Z-Omari) 250 MG tablet Take 2 tablets the first day, then 1 tablet daily for 4 days. (Patient not taking: Reported on 8/30/2023) 6 tablet 0     Facility-Administered Encounter Medications as of 8/30/2023   Medication Dose Route Frequency Provider Last Rate Last Admin    cyanocobalamin injection 1,000 mcg  1,000 mcg Intramuscular Q28 Days Marcus Linares MD        [COMPLETED] triamcinolone acetonide (KENALOG-40) injection 40 mg  40 mg Intramuscular Once Kishore Grande MD   40 mg at 08/30/23 1222    [COMPLETED] triamcinolone acetonide (KENALOG-40) injection 40 mg  40 mg Intramuscular Once Kishore Grande MD   40 mg at 08/30/23 1223       Reviewed use of high risk medication in the elderly: yes  Reviewed for potential of harmful  drug interactions in the elderly: yes    Follow Up:  Return in about 6 months (around 2/29/2024) for follow up.     There are no Patient Instructions on file for this visit.    An After Visit Summary and PPPS with all of these plans were given to the patient.         Transcribed from ambient dictation for Kishore Grande MD by Cate Joseph.  08/30/23   10:59 EDT    Patient or patient representative verbalized consent to the visit recording.  I have personally performed the services described in this document as transcribed by the above individual, and it is both accurate and complete.

## 2023-08-31 ENCOUNTER — TELEPHONE (OUTPATIENT)
Dept: INTERNAL MEDICINE | Facility: CLINIC | Age: 88
End: 2023-08-31
Payer: MEDICARE

## 2023-09-12 RX ORDER — NYSTATIN 100000 [USP'U]/G
POWDER TOPICAL
Qty: 60 G | Refills: 0 | Status: SHIPPED | OUTPATIENT
Start: 2023-09-12

## 2023-10-03 ENCOUNTER — TELEPHONE (OUTPATIENT)
Dept: INTERNAL MEDICINE | Facility: CLINIC | Age: 88
End: 2023-10-03
Payer: MEDICARE

## 2023-10-03 DIAGNOSIS — E11.21 DIABETIC NEPHROPATHY ASSOCIATED WITH TYPE 2 DIABETES MELLITUS: Primary | ICD-10-CM

## 2023-10-03 RX ORDER — BLOOD-GLUCOSE METER
KIT MISCELLANEOUS
Qty: 100 EACH | Refills: 1 | Status: SHIPPED | OUTPATIENT
Start: 2023-10-03

## 2023-10-03 NOTE — TELEPHONE ENCOUNTER
Pt called wanting to check the status of her refill for her test strips . She has been use hers to check husbands blood sugars and would like a call back

## 2023-10-04 ENCOUNTER — TELEPHONE (OUTPATIENT)
Dept: INTERNAL MEDICINE | Facility: CLINIC | Age: 88
End: 2023-10-04
Payer: MEDICARE

## 2023-10-07 ENCOUNTER — FLU SHOT (OUTPATIENT)
Age: 88
End: 2023-10-07
Payer: MEDICARE

## 2023-10-07 DIAGNOSIS — Z23 FLU VACCINE NEED: Primary | ICD-10-CM

## 2023-12-05 ENCOUNTER — TELEPHONE (OUTPATIENT)
Dept: INTERNAL MEDICINE | Facility: CLINIC | Age: 88
End: 2023-12-05

## 2023-12-05 NOTE — TELEPHONE ENCOUNTER
Patient returned the call. She stated her heart doctor put her on zetia 10mg daily. She said you didn't think that was strong enough so you advised her to take Lipitor QOD and Zetia on opposite days.

## 2023-12-05 NOTE — TELEPHONE ENCOUNTER
"Caller: Tessa Adler \"Pat\"    Relationship: Self    Best call back number: [phone]     Requested Prescriptions:   LIPITOR 10MG    Pharmacy where request should be sent: [address] [phone] [facility] [phone] [facility]     Last office visit with prescribing clinician: 8/30/2023   Last telemedicine visit with prescribing clinician: Visit date not found   Next office visit with prescribing clinician: 2/29/2024     Additional details provided by patient:     Does the patient have less than a 3 day supply:  [] Yes  [x] No    Would you like a call back once the refill request has been completed: [] Yes [x] No    If the office needs to give you a call back, can they leave a voicemail: [] Yes [x] No    Cadance Dunaway, RegSched Rep   12/05/23 08:41 EST       "

## 2023-12-06 RX ORDER — ATORVASTATIN CALCIUM 10 MG/1
10 TABLET, FILM COATED ORAL DAILY
Qty: 90 TABLET | Refills: 1 | Status: SHIPPED | OUTPATIENT
Start: 2023-12-06

## 2023-12-06 NOTE — TELEPHONE ENCOUNTER
I sent it in.  She should continue atorvastatin every other day, but should take zetia every day.

## 2024-01-02 ENCOUNTER — HOSPITAL ENCOUNTER (EMERGENCY)
Facility: HOSPITAL | Age: 89
Discharge: HOME | End: 2024-01-02
Attending: EMERGENCY MEDICINE | Admitting: EMERGENCY MEDICINE
Payer: MEDICARE

## 2024-01-02 ENCOUNTER — APPOINTMENT (OUTPATIENT)
Dept: CT IMAGING | Facility: HOSPITAL | Age: 89
End: 2024-01-02
Payer: MEDICARE

## 2024-01-02 ENCOUNTER — APPOINTMENT (OUTPATIENT)
Dept: GENERAL RADIOLOGY | Facility: HOSPITAL | Age: 89
End: 2024-01-02
Payer: MEDICARE

## 2024-01-02 VITALS
OXYGEN SATURATION: 98 % | BODY MASS INDEX: 21.26 KG/M2 | RESPIRATION RATE: 20 BRPM | HEIGHT: 63 IN | SYSTOLIC BLOOD PRESSURE: 127 MMHG | HEART RATE: 77 BPM | DIASTOLIC BLOOD PRESSURE: 74 MMHG | WEIGHT: 120 LBS | TEMPERATURE: 97.5 F

## 2024-01-02 DIAGNOSIS — S30.1XXA HEMATOMA OF RIGHT FLANK, INITIAL ENCOUNTER: Primary | ICD-10-CM

## 2024-01-02 DIAGNOSIS — S50.01XA CONTUSION OF RIGHT ELBOW, INITIAL ENCOUNTER: ICD-10-CM

## 2024-01-02 DIAGNOSIS — S51.011A SKIN TEAR OF RIGHT ELBOW WITHOUT COMPLICATION, INITIAL ENCOUNTER: ICD-10-CM

## 2024-01-02 LAB
ALBUMIN SERPL-MCNC: 4.4 G/DL (ref 3.5–5.2)
ALBUMIN/GLOB SERPL: 1.5 G/DL
ALP SERPL-CCNC: 52 U/L (ref 39–117)
ALT SERPL W P-5'-P-CCNC: 9 U/L (ref 1–33)
ANION GAP SERPL CALCULATED.3IONS-SCNC: 13 MMOL/L (ref 5–15)
AST SERPL-CCNC: 14 U/L (ref 1–32)
BACTERIA UR QL AUTO: ABNORMAL /HPF
BASOPHILS # BLD AUTO: 0.08 10*3/MM3 (ref 0–0.2)
BASOPHILS NFR BLD AUTO: 0.6 % (ref 0–1.5)
BILIRUB SERPL-MCNC: 0.3 MG/DL (ref 0–1.2)
BILIRUB UR QL STRIP: NEGATIVE
BUN SERPL-MCNC: 29 MG/DL (ref 8–23)
BUN/CREAT SERPL: 26.4 (ref 7–25)
CALCIUM SPEC-SCNC: 10.1 MG/DL (ref 8.6–10.5)
CHLORIDE SERPL-SCNC: 105 MMOL/L (ref 98–107)
CLARITY UR: CLEAR
CO2 SERPL-SCNC: 22 MMOL/L (ref 22–29)
COLOR UR: YELLOW
CREAT SERPL-MCNC: 1.1 MG/DL (ref 0.57–1)
DEPRECATED RDW RBC AUTO: 46.6 FL (ref 37–54)
EGFRCR SERPLBLD CKD-EPI 2021: 49 ML/MIN/1.73
EOSINOPHIL # BLD AUTO: 0.11 10*3/MM3 (ref 0–0.4)
EOSINOPHIL NFR BLD AUTO: 0.8 % (ref 0.3–6.2)
ERYTHROCYTE [DISTWIDTH] IN BLOOD BY AUTOMATED COUNT: 14.5 % (ref 12.3–15.4)
GLOBULIN UR ELPH-MCNC: 3 GM/DL
GLUCOSE SERPL-MCNC: 127 MG/DL (ref 65–99)
GLUCOSE UR STRIP-MCNC: NEGATIVE MG/DL
HCT VFR BLD AUTO: 33 % (ref 34–46.6)
HGB BLD-MCNC: 10.5 G/DL (ref 12–15.9)
HGB UR QL STRIP.AUTO: NEGATIVE
HYALINE CASTS UR QL AUTO: ABNORMAL /LPF
IMM GRANULOCYTES # BLD AUTO: 0.09 10*3/MM3 (ref 0–0.05)
IMM GRANULOCYTES NFR BLD AUTO: 0.7 % (ref 0–0.5)
INR PPP: 1.05 (ref 0.89–1.12)
KETONES UR QL STRIP: NEGATIVE
LEUKOCYTE ESTERASE UR QL STRIP.AUTO: ABNORMAL
LYMPHOCYTES # BLD AUTO: 2.71 10*3/MM3 (ref 0.7–3.1)
LYMPHOCYTES NFR BLD AUTO: 20.5 % (ref 19.6–45.3)
MCH RBC QN AUTO: 28.1 PG (ref 26.6–33)
MCHC RBC AUTO-ENTMCNC: 31.8 G/DL (ref 31.5–35.7)
MCV RBC AUTO: 88.2 FL (ref 79–97)
MONOCYTES # BLD AUTO: 0.75 10*3/MM3 (ref 0.1–0.9)
MONOCYTES NFR BLD AUTO: 5.7 % (ref 5–12)
NEUTROPHILS NFR BLD AUTO: 71.7 % (ref 42.7–76)
NEUTROPHILS NFR BLD AUTO: 9.47 10*3/MM3 (ref 1.7–7)
NITRITE UR QL STRIP: NEGATIVE
NRBC BLD AUTO-RTO: 0 /100 WBC (ref 0–0.2)
PH UR STRIP.AUTO: <=5 [PH] (ref 5–8)
PLATELET # BLD AUTO: 336 10*3/MM3 (ref 140–450)
PMV BLD AUTO: 9.7 FL (ref 6–12)
POTASSIUM SERPL-SCNC: 4.5 MMOL/L (ref 3.5–5.2)
PROT SERPL-MCNC: 7.4 G/DL (ref 6–8.5)
PROT UR QL STRIP: NEGATIVE
PROTHROMBIN TIME: 13.8 SECONDS (ref 12.2–14.5)
RBC # BLD AUTO: 3.74 10*6/MM3 (ref 3.77–5.28)
RBC # UR STRIP: ABNORMAL /HPF
REF LAB TEST METHOD: ABNORMAL
SODIUM SERPL-SCNC: 140 MMOL/L (ref 136–145)
SP GR UR STRIP: 1.02 (ref 1–1.03)
SQUAMOUS #/AREA URNS HPF: ABNORMAL /HPF
UROBILINOGEN UR QL STRIP: ABNORMAL
WBC # UR STRIP: ABNORMAL /HPF
WBC NRBC COR # BLD AUTO: 13.21 10*3/MM3 (ref 3.4–10.8)

## 2024-01-02 PROCEDURE — 74176 CT ABD & PELVIS W/O CONTRAST: CPT

## 2024-01-02 PROCEDURE — 81001 URINALYSIS AUTO W/SCOPE: CPT | Performed by: NURSE PRACTITIONER

## 2024-01-02 PROCEDURE — 85025 COMPLETE CBC W/AUTO DIFF WBC: CPT | Performed by: NURSE PRACTITIONER

## 2024-01-02 PROCEDURE — 99284 EMERGENCY DEPT VISIT MOD MDM: CPT

## 2024-01-02 PROCEDURE — 73080 X-RAY EXAM OF ELBOW: CPT

## 2024-01-02 PROCEDURE — 80053 COMPREHEN METABOLIC PANEL: CPT | Performed by: NURSE PRACTITIONER

## 2024-01-02 PROCEDURE — 85610 PROTHROMBIN TIME: CPT | Performed by: NURSE PRACTITIONER

## 2024-01-02 PROCEDURE — 63710000001 PROMETHAZINE PER 25 MG: Performed by: NURSE PRACTITIONER

## 2024-01-02 PROCEDURE — 36415 COLL VENOUS BLD VENIPUNCTURE: CPT

## 2024-01-02 PROCEDURE — 70450 CT HEAD/BRAIN W/O DYE: CPT

## 2024-01-02 RX ORDER — ACETAMINOPHEN 500 MG
1000 TABLET ORAL ONCE
Status: COMPLETED | OUTPATIENT
Start: 2024-01-02 | End: 2024-01-02

## 2024-01-02 RX ORDER — SODIUM CHLORIDE 0.9 % (FLUSH) 0.9 %
10 SYRINGE (ML) INJECTION AS NEEDED
Status: DISCONTINUED | OUTPATIENT
Start: 2024-01-02 | End: 2024-01-02 | Stop reason: HOSPADM

## 2024-01-02 RX ORDER — PROMETHAZINE HYDROCHLORIDE 25 MG/1
12.5 TABLET ORAL ONCE
Status: COMPLETED | OUTPATIENT
Start: 2024-01-02 | End: 2024-01-02

## 2024-01-02 RX ADMIN — ACETAMINOPHEN 1000 MG: 500 TABLET ORAL at 14:36

## 2024-01-02 RX ADMIN — PROMETHAZINE HYDROCHLORIDE 12.5 MG: 25 TABLET ORAL at 14:36

## 2024-01-02 NOTE — DISCHARGE INSTRUCTIONS
Ice pack to right flank 15 minutes- 20 minutes on every hour.      Increase fluid intake.      Movement however slow movement.    Keep right elbow clean and covered.

## 2024-01-02 NOTE — ED PROVIDER NOTES
EMERGENCY DEPARTMENT ENCOUNTER    Pt Name: Tessa Adler  MRN: [id number]  Pt :   1935  Room Number:    Date of encounter:  2024  PCP: Kishore Grande MD  ED Provider: TRISTAN Marte    Historian: Patient    HPI:  Chief Complaint:   Rt flank pain, Rt elbow pain following a fall.     Context: Tessa Adler is a 88 y.o. female who presents to the ED c/o right flank pain, right elbow pain following a fall.  Patient explains that she was in a split-level home.  Patient went down for the 7 steps and then slipped down the remaining 3.  Patient landed on her right flank.  Patient also caught her elbow on a step.  She denies hitting her head or any loss of consciousness however patient is on Brilinta and recently started having pain in her head.  Patient has a hematoma to her right flank.  Patient is diabetic and reports she feels nauseated.  She denies any vomiting.  She denies chest pain or shortness of breath.  HPI     REVIEW OF SYSTEMS  A chief complaint appropriate review of systems was completed and is negative except as noted in the HPI.     PAST MEDICAL HISTORY  Past Medical History:   Diagnosis Date    ASHD (arteriosclerotic heart disease)     B12 deficiency     Rader's esophagus     Benign essential hypertension     CKD (chronic kidney disease), stage III     Diabetes mellitus without complication     GERD without esophagitis     Hemorrhoids     History of colonic polyps     Mixed hyperlipidemia     Myocardial infarction     NONSTEMI    Stress-induced cardiomyopathy 2015    Vitamin D deficiency        PAST SURGICAL HISTORY  Past Surgical History:   Procedure Laterality Date    CARPAL TUNNEL RELEASE      CATARACT EXTRACTION      CHOLECYSTECTOMY      CORONARY ANGIOPLASTY WITH STENT PLACEMENT N/A     PTCA, stents, LAD    CORONARY ANGIOPLASTY WITH STENT PLACEMENT      CORONARY ANGIOPLASTY WITH STENT PLACEMENT      HEMORRHOIDECTOMY  1965     HYSTERECTOMY  1968    AGE 33    OOPHORECTOMY Bilateral     AGE 33       FAMILY HISTORY  Family History   Problem Relation Age of Onset    Breast cancer Neg Hx     Ovarian cancer Neg Hx        SOCIAL HISTORY  Social History     Socioeconomic History    Marital status:    Tobacco Use    Smoking status: Never    Smokeless tobacco: Never   Substance and Sexual Activity    Alcohol use: No    Drug use: No    Sexual activity: Defer       ALLERGIES  Contrast dye (echo or unknown ct/mr), Iodinated contrast media, Doxycycline, Ioversol, Pantoprazole sodium, Perflutren lipid microsphere, Clarithromycin, Codeine, Dobutamine, Pantoprazole, Perflutren lipid microspheres, Sulfa antibiotics, Sulfamethoxazole-trimethoprim, and Trimethoprim    PHYSICAL EXAM  Physical Exam  Vitals and nursing note reviewed.   Constitutional:       General: She is in acute distress.      Appearance: Normal appearance. She is not ill-appearing.   HENT:      Head: Normocephalic and atraumatic.      Nose: Nose normal.      Mouth/Throat:      Mouth: Mucous membranes are moist.   Eyes:      Extraocular Movements: Extraocular movements intact.      Pupils: Pupils are equal, round, and reactive to light.   Cardiovascular:      Rate and Rhythm: Normal rate and regular rhythm.      Heart sounds: Normal heart sounds.   Pulmonary:      Effort: Pulmonary effort is normal. No respiratory distress.      Breath sounds: Normal breath sounds.   Abdominal:      General: Bowel sounds are normal. There is no distension.      Comments: Right flank: Hematoma, tenderness   Musculoskeletal:      Right elbow: Laceration (skin tear) present. Normal range of motion. Tenderness present.        Arms:       Cervical back: Normal range of motion and neck supple. No tenderness.   Skin:     General: Skin is warm and dry.   Neurological:      General: No focal deficit present.      Mental Status: She is alert and oriented to person, place, and time.   Psychiatric:         Mood  and Affect: Mood normal.         Behavior: Behavior normal.           LAB RESULTS  Results for orders placed or performed during the hospital encounter of 01/02/24   Protime-INR    Specimen: Blood   Result Value Ref Range    Protime 13.8 12.2 - 14.5 Seconds    INR 1.05 0.89 - 1.12   Comprehensive Metabolic Panel    Specimen: Blood   Result Value Ref Range    Glucose 127 (H) 65 - 99 mg/dL    BUN 29 (H) 8 - 23 mg/dL    Creatinine 1.10 (H) 0.57 - 1.00 mg/dL    Sodium 140 136 - 145 mmol/L    Potassium 4.5 3.5 - 5.2 mmol/L    Chloride 105 98 - 107 mmol/L    CO2 22.0 22.0 - 29.0 mmol/L    Calcium 10.1 8.6 - 10.5 mg/dL    Total Protein 7.4 6.0 - 8.5 g/dL    Albumin 4.4 3.5 - 5.2 g/dL    ALT (SGPT) 9 1 - 33 U/L    AST (SGOT) 14 1 - 32 U/L    Alkaline Phosphatase 52 39 - 117 U/L    Total Bilirubin 0.3 0.0 - 1.2 mg/dL    Globulin 3.0 gm/dL    A/G Ratio 1.5 g/dL    BUN/Creatinine Ratio 26.4 (H) 7.0 - 25.0    Anion Gap 13.0 5.0 - 15.0 mmol/L    eGFR 49.0 (L) >60.0 mL/min/1.73   Urinalysis With Microscopic If Indicated (No Culture) - Urine, Clean Catch    Specimen: Urine, Clean Catch   Result Value Ref Range    Color, UA Yellow Yellow, Straw    Appearance, UA Clear Clear    pH, UA <=5.0 5.0 - 8.0    Specific Gravity, UA 1.022 1.001 - 1.030    Glucose, UA Negative Negative    Ketones, UA Negative Negative    Bilirubin, UA Negative Negative    Blood, UA Negative Negative    Protein, UA Negative Negative    Leuk Esterase, UA Trace (A) Negative    Nitrite, UA Negative Negative    Urobilinogen, UA 0.2 E.U./dL 0.2 - 1.0 E.U./dL   CBC Auto Differential    Specimen: Blood   Result Value Ref Range    WBC 13.21 (H) 3.40 - 10.80 10*3/mm3    RBC 3.74 (L) 3.77 - 5.28 10*6/mm3    Hemoglobin 10.5 (L) 12.0 - 15.9 g/dL    Hematocrit 33.0 (L) 34.0 - 46.6 %    MCV 88.2 79.0 - 97.0 fL    MCH 28.1 26.6 - 33.0 pg    MCHC 31.8 31.5 - 35.7 g/dL    RDW 14.5 12.3 - 15.4 %    RDW-SD 46.6 37.0 - 54.0 fl    MPV 9.7 6.0 - 12.0 fL    Platelets 336 140 - 450  10*3/mm3    Neutrophil % 71.7 42.7 - 76.0 %    Lymphocyte % 20.5 19.6 - 45.3 %    Monocyte % 5.7 5.0 - 12.0 %    Eosinophil % 0.8 0.3 - 6.2 %    Basophil % 0.6 0.0 - 1.5 %    Immature Grans % 0.7 (H) 0.0 - 0.5 %    Neutrophils, Absolute 9.47 (H) 1.70 - 7.00 10*3/mm3    Lymphocytes, Absolute 2.71 0.70 - 3.10 10*3/mm3    Monocytes, Absolute 0.75 0.10 - 0.90 10*3/mm3    Eosinophils, Absolute 0.11 0.00 - 0.40 10*3/mm3    Basophils, Absolute 0.08 0.00 - 0.20 10*3/mm3    Immature Grans, Absolute 0.09 (H) 0.00 - 0.05 10*3/mm3    nRBC 0.0 0.0 - 0.2 /100 WBC   Urinalysis, Microscopic Only - Urine, Clean Catch    Specimen: Urine, Clean Catch   Result Value Ref Range    RBC, UA 0-2 None Seen, 0-2 /HPF    WBC, UA 0-2 None Seen, 0-2 /HPF    Bacteria, UA None Seen None Seen, Trace /HPF    Squamous Epithelial Cells, UA 3-6 (A) None Seen, 0-2 /HPF    Hyaline Casts, UA 0-6 0 - 6 /LPF    Methodology Automated Microscopy        If labs were ordered, I independently reviewed the results and considered them in treating the patient.    RADIOLOGY  XR Elbow 3+ View Right   Final Result   Impression:   No acute osseous findings.         Electronically Signed: Clif Coffey MD     1/2/2024 2:25 PM EST     Workstation ID: [id number]      CT Abdomen Pelvis Without Contrast   Final Result   Impression:   1. Subcutaneous hematoma overlying right posterior lumbar region measuring 7.4 x 4.4 x 2.7 cm.   2. No peritoneal or retroperitoneal hemorrhage.   3. No acute fracture.   4. Additional chronic findings above.            Electronically Signed: Ortiz Naranjo MD     1/2/2024 2:10 PM EST     Workstation ID: [id number]      CT Head Without Contrast   Final Result   Impression:   No acute intracranial findings.      Chronic and senescent changes as above.            Electronically Signed: Clif Coffey MD     1/2/2024 2:00 PM EST     Workstation ID: [id number]        [x] Radiologist's Report Reviewed:  I ordered and independently interpreted the above  noted radiographic studies.  See radiologist's dictation for official interpretation.      PROCEDURES    Procedures    No orders to display       MEDICATIONS GIVEN IN ER    Medications   acetaminophen (TYLENOL) tablet 1,000 mg (1,000 mg Oral Given 1/2/24 1436)   promethazine (PHENERGAN) tablet 12.5 mg (12.5 mg Oral Given 1/2/24 1436)       MEDICAL DECISION MAKING, PROGRESS, and CONSULTS   Medical Decision Making  Tessa Adler is a 88 y.o. female who presents to the ED c/o right flank pain, right elbow pain following a fall.  Patient explains that she was in a split-level home.  Patient went down for the 7 steps and then slipped down the remaining 3.  Patient landed on her right flank.  Patient also caught her elbow on a step.  She denies hitting her head or any loss of consciousness however patient is on Brilinta and recently started having pain in her head.  Patient has a hematoma to her right flank.  Patient is diabetic and reports she feels nauseated.  She denies any vomiting.  She denies chest pain or shortness of breath.      Problems Addressed:  Contusion of right elbow, initial encounter: complicated acute illness or injury     Details: Right elbow x-rays negative for acute findings.  Hematoma of right flank, initial encounter: complicated acute illness or injury     Details: CT imaging reveals a hematoma of the right flank.  We will discharge the patient home.  Patient to apply ice to the area.  Patient to return to the ER for any worsening conditions.  Patient to follow-up with her PCP.  Skin tear of right elbow without complication, initial encounter: complicated acute illness or injury     Details: Patient has a skin tear to the right elbow.  Wound care was performed while in the ED.    Amount and/or Complexity of Data Reviewed  Labs: ordered. Decision-making details documented in ED Course.  Radiology: ordered. Decision-making details documented in ED Course.    Risk  OTC drugs.  Prescription  drug management.        All labs have been independently reviewed by me.  All radiology studies have been interpreted by me and the radiologist dictating the report.  All EKG's have been independently interpreted by me as well as and overseeing attending physician.    [] Discussed with radiology regarding test interpretation:    Discussion below represents my analysis of pertinent findings related to patient's condition, differential diagnosis, treatment plan and final disposition.    Differential diagnosis:  The differential diagnosis associated with the patient's presentation includes: Hematoma, intra-abdominal abnormality, AUNDREA, renal contusion, intracranial abnormality, fracture, dislocation, skin.    Additional sources  Discussed/ obtained information from independent historians:   [] Spouse  [] Parent  [x] Family member  [] Friend  [] EMS   [] Other:  External (non-ED) record review:   [] Inpatient record:   [] Office record:   [] Outpatient record:   [x] Prior Outpatient labs:   [x] Prior Outpatient radiology:   [] Primary Care record:   [] Outside ED record:   [] Other:   Patient's care impacted by:   [x] Diabetes  [] Hypertension  [] Hyperlipidemia  [] Hypothyroidism   [x] Coronary Artery Disease   [] COPD   [] Cancer   [] Obesity  [] GERD   [] Tobacco Abuse   [] Substance Abuse    [] Anxiety   [] Depression   [x] Other: Patient is on Brilinta  Care significantly affected by Social Determinants of Health (housing and economic circumstances, unemployment)    [] Yes     [x] No   If yes, Patient's care significantly limited by  Social Determinants of Health including:   [] Inadequate housing   [] Low income   [] Alcoholism and drug addiction in family   [] Problems related to primary support group   [] Unemployment   [] Problems related to employment   [] Other Social Determinants of Health:     Shared decision making: Shared decision making with patient and family.  We will discharge the patient home.  Patient  to apply ice to the right flank for 15 to 20 minutes at a time every hour.  Patient to take Tylenol for pain.  We discussed slow movements.  Patient to watch for signs of infection on right elbow.  Patient agrees with the treatment plan.    Orders placed during this visit:  Orders Placed This Encounter   Procedures    CT Abdomen Pelvis Without Contrast    CT Head Without Contrast    XR Elbow 3+ View Right    Protime-INR    Comprehensive Metabolic Panel    Urinalysis With Microscopic If Indicated (No Culture) - Urine, Clean Catch    CBC Auto Differential    Urinalysis, Microscopic Only - Urine, Clean Catch    Orthostatic Vitals    CBC & Differential       I considered prescription management  with:   [] Pain medication  [] Antiviral  [] Antibiotic   [] Other:   Rationale:  Additional orders considered but not ordered:  The following testing was considered but ultimately not selected after discussion with patient/family:    ED Course:    ED Course as of 01/02/24 2234 Tue Jan 02, 2024   1436 CT of the head reviewed negative for acute findings. [KG]   8040 Bruce Waggoner MD  I saw and evaluated this patient.  Her hematoma is significant but both she and her female  both believe that it is smaller than when she arrived.  She feels that her swelling is going down.  She is adamant that she wishes to be discharged so that she can help take care of her elderly .  I think that she is stable to do so and she understands the need for increasing fluids, icing her back, returning immediately if worse. [MS]      ED Course User Index  [KG] Ernestine Bynum, APRN  [MS] Bruce Waggoner MD            DIAGNOSIS  Final diagnoses:   Hematoma of right flank, initial encounter   Contusion of right elbow, initial encounter   Skin tear of right elbow without complication, initial encounter       DISPOSITION    DISCHARGE    Patient discharged in stable condition.    Reviewed implications of results, diagnosis, meds,  responsibility to follow up, warning signs and symptoms of possible worsening, potential complications and reasons to return to ER.    Patient/Family voiced understanding of above instructions.    Discussed plan for discharge, as there is no emergent indication for admission.  Pt/family is agreeable and understands need for follow up and possible repeat testing.  Pt/family is aware that discharge does not mean that nothing is wrong but that it indicates no emergency is currently present that requires admission and they must continue care with follow-up as given below or with a physician of their choice.     FOLLOW-UP  Kishore Grande MD  [address] [address]  [phone]               Medication List        Changed      promethazine 25 MG tablet  Commonly known as: PHENERGAN  Take 1 tablet by mouth Every 8 (Eight) Hours As Needed for Nausea or Vomiting.  What changed: when to take this               ED Disposition       ED Disposition   Discharge    Condition   Stable    Comment   --               Please note that portions of this document were completed with voice recognition software.       Ernestine Bynum, APRN  01/02/24 0982

## 2024-01-09 RX ORDER — LANCETS 28 GAUGE
EACH MISCELLANEOUS
Qty: 100 EACH | Refills: 1 | Status: SHIPPED | OUTPATIENT
Start: 2024-01-09

## 2024-01-09 NOTE — TELEPHONE ENCOUNTER
Rx Refill Note  Requested Prescriptions     Pending Prescriptions Disp Refills    Lancets (freestyle) lancets 100 each 1     Sig: Use once daily as instructed for blood sugar testing    Dx Code E11.65  Last office visit with prescribing clinician: Visit date not found   Last telemedicine visit with prescribing clinician: Visit date not found   Next office visit with prescribing clinician: Visit date not found                         Would you like a call back once the refill request has been completed: [] Yes [] No    If the office needs to give you a call back, can they leave a voicemail: [] Yes [] No    Ileana Vang MA  01/09/24, 14:57 EST

## 2024-01-11 ENCOUNTER — OFFICE VISIT (OUTPATIENT)
Dept: INTERNAL MEDICINE | Facility: CLINIC | Age: 89
End: 2024-01-11
Payer: MEDICARE

## 2024-01-11 VITALS
BODY MASS INDEX: 22.82 KG/M2 | TEMPERATURE: 97.3 F | HEART RATE: 68 BPM | DIASTOLIC BLOOD PRESSURE: 68 MMHG | WEIGHT: 128.8 LBS | HEIGHT: 63 IN | SYSTOLIC BLOOD PRESSURE: 142 MMHG

## 2024-01-11 DIAGNOSIS — F41.9 ANXIETY: ICD-10-CM

## 2024-01-11 DIAGNOSIS — T14.8XXA HEMATOMA: Primary | ICD-10-CM

## 2024-01-11 RX ORDER — ALPRAZOLAM 0.5 MG/1
0.5 TABLET ORAL 2 TIMES DAILY PRN
Qty: 60 TABLET | Refills: 3 | Status: SHIPPED | OUTPATIENT
Start: 2024-01-11

## 2024-01-11 RX ORDER — NYSTATIN 100000 [USP'U]/G
POWDER TOPICAL 2 TIMES DAILY
Qty: 60 G | Refills: 5 | Status: SHIPPED | OUTPATIENT
Start: 2024-01-11

## 2024-01-11 RX ORDER — ISOSORBIDE MONONITRATE 30 MG/1
15 TABLET, EXTENDED RELEASE ORAL DAILY
COMMUNITY
Start: 2024-01-11 | End: 2025-01-10

## 2024-01-11 RX ORDER — NYSTATIN 100000 [USP'U]/G
POWDER TOPICAL 2 TIMES DAILY
Qty: 60 G | Refills: 5 | Status: SHIPPED | OUTPATIENT
Start: 2024-01-11 | End: 2024-01-11 | Stop reason: SDUPTHER

## 2024-01-11 RX ORDER — VALACYCLOVIR HYDROCHLORIDE 1 G/1
1000 TABLET, FILM COATED ORAL 2 TIMES DAILY
Qty: 20 TABLET | Refills: 0 | Status: SHIPPED | OUTPATIENT
Start: 2024-01-11

## 2024-01-11 NOTE — PROGRESS NOTES
[facility]     Tessa Adler  1935   [id number]      Patient Care Team:  Kishore Grande MD as PCP - General  Kishore Grande MD as PCP - Family Medicine    Chief Complaint::   Chief Complaint   Patient presents with    Follow-up     ER - 1/2/24  flank pain - large hematoma        HPI  The patient was seen in the emergency department on 01/02/2024 after falling down 7 steps, landing on her right flank. She complained of pain in the right flank and right elbow. CT scan subsequently showed a large hematoma in the right posterior lumbar region.    Hematoma secondary to a fall  The hematoma on her right flank has improved. She saw her cardiologist today, 01/11/2024, for a checkup. She was told to start applying heat to the area. She has been applying ice to the area. She has been sleeping on the couch because she cannot sleep on her side or on her back.    Skin lesion on left forearm  She has had a painful rash on her left forearm for 3 months. She has been applying Neosporin and Vaseline to the area. Her pain radiates down through her upper extremity. She has not seen a dermatologist.    Chronic Conditions:      Patient Active Problem List   Diagnosis    B12 deficiency    Mixed hyperlipidemia    GERD without esophagitis    Primary hypertension    ASHD (arteriosclerotic heart disease)    Vitamin D deficiency    CKD (chronic kidney disease), stage III    Annual physical exam    BMI 25.0-25.9,adult    Diabetic nephropathy associated with type 2 diabetes mellitus    Idiopathic osteoarthritis    Insomnia    Irritable bowel syndrome    Medicare annual wellness visit, subsequent    Osteoarthritis    Postmenopausal osteoporosis    Trigger finger of both hands    Diabetic polyneuropathy associated with type 2 diabetes mellitus    Spondylolisthesis at L4-L5 level    Yeast infection        Past Medical History:   Diagnosis Date    ASHD (arteriosclerotic heart disease)     B12 deficiency      Rader's esophagus     Benign essential hypertension     CKD (chronic kidney disease), stage III     Diabetes mellitus without complication     GERD without esophagitis     Hemorrhoids     History of colonic polyps     Mixed hyperlipidemia     Myocardial infarction     NONSTEMI    Stress-induced cardiomyopathy 02/27/2015    Vitamin D deficiency        Past Surgical History:   Procedure Laterality Date    CARPAL TUNNEL RELEASE  1989    CATARACT EXTRACTION      CHOLECYSTECTOMY  2001    CORONARY ANGIOPLASTY WITH STENT PLACEMENT N/A 2010    PTCA, stents, LAD    CORONARY ANGIOPLASTY WITH STENT PLACEMENT  2015    CORONARY ANGIOPLASTY WITH STENT PLACEMENT  2020    HEMORRHOIDECTOMY  1965    HYSTERECTOMY  1968    AGE 33    OOPHORECTOMY Bilateral     AGE 33       Family History   Problem Relation Age of Onset    Breast cancer Neg Hx     Ovarian cancer Neg Hx        Social History     Socioeconomic History    Marital status:    Tobacco Use    Smoking status: Never    Smokeless tobacco: Never   Substance and Sexual Activity    Alcohol use: No    Drug use: No    Sexual activity: Defer       Allergies   Allergen Reactions    Contrast Dye (Echo Or Unknown Ct/Mr) Anaphylaxis    Iodinated Contrast Media Unknown (See Comments), Other (See Comments) and Swelling     unknown  unknown  Tongue swelling  Contrast Dye     Doxycycline Diarrhea and Nausea And Vomiting    Ioversol Swelling     Tongue swelling  Contrast Dye     Pantoprazole Sodium Unknown (See Comments)     Unknown  Protonix     Perflutren Lipid Microsphere Unknown (See Comments)     Unknown  Definity     Clarithromycin Unknown (See Comments) and Other (See Comments)     Unknown  Biaxin  Unknown  Biaxin    Codeine Unknown (See Comments) and Other (See Comments)     unknown  unknown    Dobutamine Unknown (See Comments), Nausea Only and Other (See Comments)     unknown  unknown    Pantoprazole Other (See Comments) and Unknown (See Comments)     Unknown  Protonix      Perflutren Lipid Microspheres Other (See Comments)     Unknown  Definity     Sulfa Antibiotics Unknown (See Comments) and Other (See Comments)     Unknown  Bactrim  Unknown  Bactrim    Sulfamethoxazole-Trimethoprim Unknown (See Comments)    Trimethoprim Unknown (See Comments) and Other (See Comments)     Unknown  Bactrim  Unknown  Bactrim         Current Outpatient Medications:     ALPRAZolam (XANAX) 0.5 MG tablet, Take 1 tablet by mouth 2 (Two) Times a Day As Needed for Anxiety. for anxiety, Disp: 60 tablet, Rfl: 3    aspirin 81 MG EC tablet, Take 1 tablet by mouth Daily. Take one daily, Disp: , Rfl:     atorvastatin (LIPITOR) 10 MG tablet, Take 1 tablet by mouth Daily., Disp: 90 tablet, Rfl: 1    carvedilol (COREG) 6.25 MG tablet, Take 1 tablet by mouth 2 (Two) Times a Day With Meals., Disp: , Rfl:     Cholecalciferol (VITAMIN D3) 2000 units tablet, Take  by mouth. Take one daily, Disp: , Rfl:     clobetasol (TEMOVATE) 0.05 % external solution, Apply 1 application  topically to the appropriate area as directed 2 (Two) Times a Day., Disp: , Rfl:     Cyanocobalamin 1000 MCG/ML kit, Inject  as directed. Take injection once a month, Disp: , Rfl:     erythromycin (ROMYCIN) 5 MG/GM ophthalmic ointment, Administer 1 application  into the left eye 2 (Two) Times a Day., Disp: , Rfl:     glucose blood (FREESTYLE LITE) test strip, Check once daily E11.9, Disp: 100 each, Rfl: 1    hydrOXYzine (ATARAX) 25 MG tablet, Take 1 tablet by mouth 3 (Three) Times a Day As Needed for Itching., Disp: 30 tablet, Rfl: 5    isosorbide mononitrate (IMDUR) 30 MG 24 hr tablet, Take 0.5 tablets by mouth Daily., Disp: , Rfl:     Lancets (freestyle) lancets, Use once daily as instructed for blood sugar testing, Disp: 100 each, Rfl: 1    lisinopril (PRINIVIL,ZESTRIL) 10 MG tablet, Take 1 tablet by mouth Daily., Disp: 90 tablet, Rfl: 3    metFORMIN (GLUCOPHAGE) 1000 MG tablet, Take 1 tablet by mouth 2 (Two) Times a Day With Meals., Disp: 180  tablet, Rfl: 3    Multiple Vitamins-Minerals (ICAPS AREDS 2 PO), Take 1 capsule by mouth Daily., Disp: , Rfl:     nitroglycerin (NITROSTAT) 0.4 MG SL tablet, Place 1 tablet under the tongue As Needed for Chest Pain. Take no more than 3 doses in 15 minutes., Disp: 25 tablet, Rfl: 5    nystatin (MYCOSTATIN) 107110 UNIT/GM cream, Apply  topically to the appropriate area as directed Daily As Needed (yeast infection)., Disp: 30 g, Rfl: 3    nystatin (MYCOSTATIN) 590083 UNIT/GM powder, Apply  topically to the appropriate area as directed 2 (Two) Times a Day., Disp: 60 g, Rfl: 5    promethazine (PHENERGAN) 25 MG tablet, Take 1 tablet by mouth Every 8 (Eight) Hours As Needed for Nausea or Vomiting. (Patient taking differently: Take 1 tablet by mouth Every 6 (Six) Hours As Needed for Nausea or Vomiting.), Disp: 30 tablet, Rfl: 1    ticagrelor (BRILINTA) 60 MG tablet tablet, Take 1 tablet by mouth., Disp: , Rfl:     ezetimibe (ZETIA) 10 MG tablet, Take 1 tablet by mouth Daily., Disp: , Rfl:     valACYclovir (Valtrex) 1000 MG tablet, Take 1 tablet by mouth 2 (Two) Times a Day., Disp: 20 tablet, Rfl: 0    Current Facility-Administered Medications:     cyanocobalamin injection 1,000 mcg, 1,000 mcg, Intramuscular, Q28 Days, Marcus Linares MD    Review of Systems   Constitutional:  Negative for chills, fatigue and fever.   HENT:  Negative for congestion, ear pain and sinus pressure.    Respiratory:  Negative for cough, chest tightness, shortness of breath and wheezing.    Cardiovascular:  Negative for chest pain and palpitations.   Gastrointestinal:  Negative for abdominal pain, blood in stool and constipation.   Skin:  Negative for color change.   Allergic/Immunologic: Negative for environmental allergies.   Neurological:  Negative for dizziness, speech difficulty and headache.   Psychiatric/Behavioral:  Negative for decreased concentration. The patient is not nervous/anxious.         Vital Signs  Vitals:    01/11/24 1156  "  BP: 142/68   BP Location: Left arm   Patient Position: Sitting   Cuff Size: Adult   Pulse: 68   Temp: 97.3 °F (36.3 °C)   Weight: 58.4 kg (128 lb 12.8 oz)   Height: 160 cm (62.99\")   PainSc:   3   PainLoc: Back       Physical Exam  Skin:     Comments: There is a large hematoma at the right posterior lumbar area surrounded by a very large area of ecchymosis.          Procedures    ACE III MINI             Assessment/Plan:    Diagnoses and all orders for this visit:    1. Hematoma (Primary)    2. Anxiety  -     ALPRAZolam (XANAX) 0.5 MG tablet; Take 1 tablet by mouth 2 (Two) Times a Day As Needed for Anxiety. for anxiety  Dispense: 60 tablet; Refill: 3    Other orders  -     Discontinue: nystatin (MYCOSTATIN) 599243 UNIT/GM powder; Apply  topically to the appropriate area as directed 2 (Two) Times a Day.  Dispense: 60 g; Refill: 5  -     valACYclovir (Valtrex) 1000 MG tablet; Take 1 tablet by mouth 2 (Two) Times a Day.  Dispense: 20 tablet; Refill: 0  -     nystatin (MYCOSTATIN) 689327 UNIT/GM powder; Apply  topically to the appropriate area as directed 2 (Two) Times a Day.  Dispense: 60 g; Refill: 5        1. Hematoma secondary to a fall  - This will gradually resolve. She may use heat if she wishes. No further treatment or follow-up is indicated.    2. Skin lesion on left forearm  - Appears blistering in nature, but it has been there for several months. We will empirically treat with valacyclovir. If no better, she will be referred to dermatology.    Follow up as previously scheduled.    Plan of care reviewed with patient at the conclusion of today's visit. Education was provided regarding diagnosis, management, and any prescribed or recommended OTC medications.Patient verbalizes understanding of and agreement with management plan.         Kishore Grande MD         Transcribed from ambient dictation for Kishore Grande MD by Yadira Maravilla.  01/11/24   13:20 EST    Patient or patient representative " verbalized consent to the visit recording.  I have personally performed the services described in this document as transcribed by the above individual, and it is both accurate and complete.

## 2024-01-12 NOTE — TELEPHONE ENCOUNTER
Caller: Tessa Adler    Relationship: Self    Best call back number: [phone]    What medication are you requesting: NYSTATIN POWDER AND DIFLUCAN PILLS     What are your current symptoms: YEAST INFECTION ON UPPER INNER THIGH AREA     How long have you been experiencing symptoms: ABOUT A WEEK     Have you had these symptoms before:    [x] Yes  [] No    Have you been treated for these symptoms before:   [x] Yes  [] No    If a prescription is needed, what is your preferred pharmacy and phone number: [address] [place] [address] [phone] [facility] [phone]      Additional notes:THE PATIENT STATES THAT SHE DOCTOR USUALLY GIVES HER  3 DIFLUCAN PILLS AND SHE NEEDS THE NYSTATIN POWDER PLEASE CALL PATIENT TO LET HER KNOW IF THAT CAN BE CALLED IN        Patient last seen by you for weight loss. Please advise.

## 2024-01-26 ENCOUNTER — TELEPHONE (OUTPATIENT)
Dept: INTERNAL MEDICINE | Facility: CLINIC | Age: 89
End: 2024-01-26
Payer: MEDICARE

## 2024-01-26 NOTE — TELEPHONE ENCOUNTER
Patient understood and verbalized understanding.    I advised as long as it keeps improving to see us at upcoming appt on 2/29/24. If it does not get better or gets worse then to call us back next week to check on this

## 2024-01-29 NOTE — TELEPHONE ENCOUNTER
Spoke with pt. She reports the spot on her back has not improved at all, she has been using warm compresses.    She reports shoulder pain on her right side as well that she thinks may be related to her fall

## 2024-01-29 NOTE — TELEPHONE ENCOUNTER
Patient understood and verbalized understanding.    She will callback if her shoulder pain isn't better

## 2024-02-02 ENCOUNTER — OFFICE VISIT (OUTPATIENT)
Dept: INTERNAL MEDICINE | Facility: CLINIC | Age: 89
End: 2024-02-02
Payer: MEDICARE

## 2024-02-02 VITALS
BODY MASS INDEX: 22.5 KG/M2 | WEIGHT: 127 LBS | DIASTOLIC BLOOD PRESSURE: 70 MMHG | HEIGHT: 63 IN | TEMPERATURE: 97.7 F | SYSTOLIC BLOOD PRESSURE: 136 MMHG | HEART RATE: 80 BPM

## 2024-02-02 DIAGNOSIS — T14.8XXA MUSCLE STRAIN: Primary | ICD-10-CM

## 2024-02-02 DIAGNOSIS — M25.511 ACUTE PAIN OF RIGHT SHOULDER: ICD-10-CM

## 2024-02-02 RX ORDER — CYCLOBENZAPRINE HCL 5 MG
5 TABLET ORAL NIGHTLY PRN
Qty: 14 TABLET | Refills: 0 | Status: SHIPPED | OUTPATIENT
Start: 2024-02-02

## 2024-02-02 NOTE — PROGRESS NOTES
Acute Office Visit      Name: Tessa Adler    : 1935     MRN: [id number]   Care Team: Patient Care Team:  Kishore Grande MD as PCP - General  Kishore Grande MD as PCP - Family Medicine    Chief Complaint  Shoulder Pain (X 2-3 weeks (right side))    Subjective     History of Present Illness:  The patient is an 88-year-old female who presents for evaluation of right shoulder pain.    She slipped on 3 steps at home on 2024 and injured her right hip.  Approximately 2 weeks later she began to experience right shoulder pain.  She does not feel that the 2 are related.  She currently rates her pain as an 8 out of 10; however, at the end of the day it is a 10 out of 10. She still has a large hematoma from the fall. The shoulder pain started about 2 weeks ago when she was taking out garbage and she slung it 4 times. She believes she may have injured it then. She still has a big lump on her right lower back/hip; however, it is improving. She has not started physical therapy. She has tried Tylenol, which does help. She has also tried moist heat and Biofreeze, which also do not help. She is not supposed to take ibuprofen. She had a CT scan of the brain, abdomen, pelvis, and back after her fall. She is able to raise her right arm.    She takes Xanax at bedtime. 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 They go to bed at approximately 10 p.m.      Review of Systems:    Past Medical History:   Diagnosis Date    ASHD (arteriosclerotic heart disease)     B12 deficiency     Rader's esophagus     Benign essential hypertension     CKD (chronic kidney disease), stage III     Diabetes mellitus without complication     GERD without esophagitis     Hemorrhoids     History of colonic polyps     Mixed hyperlipidemia     Myocardial infarction     NONSTEMI    Stress-induced cardiomyopathy 2015    Vitamin D deficiency         Past Surgical History:   Procedure Laterality Date    CARPAL TUNNEL RELEASE  1989    CATARACT EXTRACTION      CHOLECYSTECTOMY  2001    CORONARY ANGIOPLASTY WITH STENT PLACEMENT N/A 2010    PTCA, stents, LAD    CORONARY ANGIOPLASTY WITH STENT PLACEMENT  2015    CORONARY ANGIOPLASTY WITH STENT PLACEMENT  2020    HEMORRHOIDECTOMY  1965    HYSTERECTOMY  1968    AGE 33    OOPHORECTOMY Bilateral     AGE 33       Social History     Socioeconomic History    Marital status:    Tobacco Use    Smoking status: Never    Smokeless tobacco: Never   Substance and Sexual Activity    Alcohol use: No    Drug use: No    Sexual activity: Defer         Current Outpatient Medications:     ALPRAZolam (XANAX) 0.5 MG tablet, Take 1 tablet by mouth 2 (Two) Times a Day As Needed for Anxiety. for anxiety, Disp: 60 tablet, Rfl: 3    aspirin 81 MG EC tablet, Take 1 tablet by mouth Daily. Take one daily, Disp: , Rfl:     atorvastatin (LIPITOR) 10 MG tablet, Take 1 tablet by mouth Daily., Disp: 90 tablet, Rfl: 1    carvedilol (COREG) 6.25 MG tablet, Take 1 tablet by mouth 2 (Two) Times a Day With Meals., Disp: , Rfl:     Cholecalciferol (VITAMIN D3) 2000 units tablet, Take  by mouth. Take one daily, Disp: , Rfl:     clobetasol (TEMOVATE) 0.05 % external solution, Apply 1 Application topically to the appropriate area as directed 2 (Two) Times a Day., Disp: , Rfl:     Cyanocobalamin 1000 MCG/ML kit, Inject  as directed. Take injection once a month, Disp: , Rfl:     erythromycin (ROMYCIN) 5 MG/GM ophthalmic ointment, Administer 1 Application into the left eye 2 (Two) Times a Day., Disp: , Rfl:     glucose blood (FREESTYLE LITE) test strip, Check once daily E11.9, Disp: 100 each, Rfl: 1    hydrOXYzine (ATARAX) 25 MG tablet, Take 1 tablet by mouth 3 (Three) Times a Day As Needed for Itching., Disp: 30 tablet, Rfl: 5    isosorbide mononitrate (IMDUR) 30 MG 24 hr tablet, Take 0.5 tablets by mouth Daily., Disp: , Rfl:     Lancets (freestyle)  "lancets, Use once daily as instructed for blood sugar testing, Disp: 100 each, Rfl: 1    lisinopril (PRINIVIL,ZESTRIL) 10 MG tablet, Take 1 tablet by mouth Daily., Disp: 90 tablet, Rfl: 3    metFORMIN (GLUCOPHAGE) 1000 MG tablet, Take 1 tablet by mouth 2 (Two) Times a Day With Meals., Disp: 180 tablet, Rfl: 3    Multiple Vitamins-Minerals (ICAPS AREDS 2 PO), Take 1 capsule by mouth Daily., Disp: , Rfl:     nitroglycerin (NITROSTAT) 0.4 MG SL tablet, Place 1 tablet under the tongue As Needed for Chest Pain. Take no more than 3 doses in 15 minutes., Disp: 25 tablet, Rfl: 5    nystatin (MYCOSTATIN) 689704 UNIT/GM cream, Apply  topically to the appropriate area as directed Daily As Needed (yeast infection)., Disp: 30 g, Rfl: 3    nystatin (MYCOSTATIN) 672785 UNIT/GM powder, Apply  topically to the appropriate area as directed 2 (Two) Times a Day., Disp: 60 g, Rfl: 5    promethazine (PHENERGAN) 25 MG tablet, Take 1 tablet by mouth Every 8 (Eight) Hours As Needed for Nausea or Vomiting. (Patient taking differently: Take 1 tablet by mouth Every 6 (Six) Hours As Needed for Nausea or Vomiting.), Disp: 30 tablet, Rfl: 1    ticagrelor (BRILINTA) 60 MG tablet tablet, Take 1 tablet by mouth., Disp: , Rfl:     valACYclovir (Valtrex) 1000 MG tablet, Take 1 tablet by mouth 2 (Two) Times a Day., Disp: 20 tablet, Rfl: 0    cyclobenzaprine (FLEXERIL) 5 MG tablet, Take 1 tablet by mouth At Night As Needed for Muscle Spasms., Disp: 14 tablet, Rfl: 0    ezetimibe (ZETIA) 10 MG tablet, Take 1 tablet by mouth Daily., Disp: , Rfl:     Current Facility-Administered Medications:     cyanocobalamin injection 1,000 mcg, 1,000 mcg, Intramuscular, Q28 Days, Marcus Linares MD    Procedures    PHQ-9 Total Score:      Objective     Vital Signs  /70 (BP Location: Left arm, Patient Position: Sitting, Cuff Size: Adult)   Pulse 80   Temp 97.7 °F (36.5 °C) (Temporal)   Ht 160 cm (62.99\")   Wt 57.6 kg (127 lb)   BMI 22.50 kg/m²   Estimated " "body mass index is 22.5 kg/m² as calculated from the following:    Height as of this encounter: 160 cm (62.99\").    Weight as of this encounter: 57.6 kg (127 lb).    BMI is within normal parameters. No other follow-up for BMI required.      Physical Exam  Vitals and nursing note reviewed.   HENT:      Head: Normocephalic.   Musculoskeletal:      Right shoulder: Tenderness present. Decreased range of motion.      Left shoulder: Normal.   Skin:     General: Skin is warm and dry.   Neurological:      General: No focal deficit present.      Mental Status: She is alert and oriented to person, place, and time.   Psychiatric:         Mood and Affect: Mood normal.         Behavior: Behavior normal.         Thought Content: Thought content normal.         Judgment: Judgment normal.          @[facility]@    Assessment and Plan     Assessment/Plan:  Diagnoses and all orders for this visit:    1. Muscle strain (Primary)  -     cyclobenzaprine (FLEXERIL) 5 MG tablet; Take 1 tablet by mouth At Night As Needed for Muscle Spasms.  Dispense: 14 tablet; Refill: 0    2. Acute pain of right shoulder  -Likely musculoskeletal in origin.  - I will prescribe a muscle relaxer to take once nightly for 2 weeks.   - She was warned that it could make her lethargic.   - She was advised to continue applying heat and ice.   - She can take Tylenol during the day.   - She was advised to avoid pulling or tugging.   - If it does not go away, she will be referred to physical therapy.  She declines physical therapy at this time.  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    There are no Patient Instructions on file for this visit.  Plan of care reviewed with patient at the conclusion of today's visit. Education was provided regarding diagnosis, management and any prescribed or recommended OTC medications.  Patient verbalizes understanding of and " agreement with management plan.    Follow Up  Return for Next Scheduled Follow up.    TRISTAN Greene     Transcribed from ambient dictation for TRISTAN Greene by Ansley Arceo.  02/02/24   16:03 EST    Patient or patient representative verbalized consent to the visit recording.  I have personally performed the services described in this document as transcribed by the above individual, and it is both accurate and complete.

## 2024-02-29 ENCOUNTER — LAB (OUTPATIENT)
Dept: LAB | Facility: HOSPITAL | Age: 89
End: 2024-02-29
Payer: MEDICARE

## 2024-02-29 ENCOUNTER — OFFICE VISIT (OUTPATIENT)
Dept: INTERNAL MEDICINE | Facility: CLINIC | Age: 89
End: 2024-02-29
Payer: MEDICARE

## 2024-02-29 VITALS
DIASTOLIC BLOOD PRESSURE: 60 MMHG | TEMPERATURE: 97.8 F | HEIGHT: 63 IN | HEART RATE: 74 BPM | WEIGHT: 127.6 LBS | BODY MASS INDEX: 22.61 KG/M2 | SYSTOLIC BLOOD PRESSURE: 106 MMHG

## 2024-02-29 DIAGNOSIS — E11.21 DIABETIC NEPHROPATHY ASSOCIATED WITH TYPE 2 DIABETES MELLITUS: ICD-10-CM

## 2024-02-29 DIAGNOSIS — I10 PRIMARY HYPERTENSION: ICD-10-CM

## 2024-02-29 DIAGNOSIS — E55.9 VITAMIN D DEFICIENCY: ICD-10-CM

## 2024-02-29 DIAGNOSIS — E78.2 MIXED HYPERLIPIDEMIA: ICD-10-CM

## 2024-02-29 DIAGNOSIS — E53.8 B12 DEFICIENCY: ICD-10-CM

## 2024-02-29 DIAGNOSIS — T14.8XXA MUSCLE STRAIN: ICD-10-CM

## 2024-02-29 DIAGNOSIS — N18.31 STAGE 3A CHRONIC KIDNEY DISEASE: ICD-10-CM

## 2024-02-29 DIAGNOSIS — E11.21 DIABETIC NEPHROPATHY ASSOCIATED WITH TYPE 2 DIABETES MELLITUS: Primary | ICD-10-CM

## 2024-02-29 PROCEDURE — 82306 VITAMIN D 25 HYDROXY: CPT

## 2024-02-29 PROCEDURE — 85025 COMPLETE CBC W/AUTO DIFF WBC: CPT

## 2024-02-29 PROCEDURE — 80061 LIPID PANEL: CPT

## 2024-02-29 PROCEDURE — 80053 COMPREHEN METABOLIC PANEL: CPT

## 2024-02-29 PROCEDURE — 83036 HEMOGLOBIN GLYCOSYLATED A1C: CPT

## 2024-02-29 PROCEDURE — 82607 VITAMIN B-12: CPT

## 2024-02-29 RX ORDER — CYCLOBENZAPRINE HCL 10 MG
10 TABLET ORAL 2 TIMES DAILY PRN
Qty: 60 TABLET | Refills: 1 | Status: SHIPPED | OUTPATIENT
Start: 2024-02-29

## 2024-02-29 RX ORDER — TRIAMCINOLONE ACETONIDE 40 MG/ML
80 INJECTION, SUSPENSION INTRA-ARTICULAR; INTRAMUSCULAR ONCE
Status: COMPLETED | OUTPATIENT
Start: 2024-02-29 | End: 2024-02-29

## 2024-02-29 RX ADMIN — TRIAMCINOLONE ACETONIDE 80 MG: 40 INJECTION, SUSPENSION INTRA-ARTICULAR; INTRAMUSCULAR at 11:20

## 2024-02-29 NOTE — PROGRESS NOTES
[facility]     Tessa Adler  1935   [id number]      Patient Care Team:  Kishore Grande MD as PCP - General  Kishore Grande MD as PCP - Family Medicine    Chief Complaint::   Chief Complaint   Patient presents with    Hypertension     fu    ASHD     FU    Shoulder Pain     RIGHT SIDE        HPI  The patient is an 88-year-old female who comes in for follow-up of hypertension, hyperlipidemia, diabetes, chronic kidney disease, and osteoarthritis. She continues to see cardiology for coronary artery disease.    The patient presents with severe shoulder pain, which she describes as reaching a 10/10 intensity by the end of the day. She reports previous consultation with Shahida approximately 3 weeks ago, who recommended a follow-up in 2 weeks but redirected her to this appointment. She was prescribed cyclobenzaprine 5 mg, which induced drowsiness but provided no pain relief. Despite being able to perform work tasks, lifting her arm exacerbates the pain.     The patient has been advised to pursue physical therapy but is unable to do so. She manages the pain with 2 Tylenol every 8 hours and applies moist heat for 15 to 20 minutes. Initially attributed to a fall, she now believes the pain may have originated from repetitive heavy lifting motions, specifically swinging a heavy garbage bag.    Chronic Conditions:  Hypertension, hyperlipidemia, diabetes, chronic kidney disease, and osteoarthritis    Patient Active Problem List   Diagnosis    B12 deficiency    Mixed hyperlipidemia    GERD without esophagitis    Primary hypertension    ASHD (arteriosclerotic heart disease)    Vitamin D deficiency    CKD (chronic kidney disease), stage III    Annual physical exam    BMI 25.0-25.9,adult    Diabetic nephropathy associated with type 2 diabetes mellitus    Idiopathic osteoarthritis    Insomnia    Irritable bowel syndrome    Medicare annual wellness visit, subsequent    Osteoarthritis     Postmenopausal osteoporosis    Trigger finger of both hands    Diabetic polyneuropathy associated with type 2 diabetes mellitus    Spondylolisthesis at L4-L5 level    Yeast infection        Past Medical History:   Diagnosis Date    ASHD (arteriosclerotic heart disease)     B12 deficiency     Rader's esophagus     Benign essential hypertension     CKD (chronic kidney disease), stage III     Diabetes mellitus without complication     GERD without esophagitis     Hemorrhoids     History of colonic polyps     Mixed hyperlipidemia     Myocardial infarction     NONSTEMI    Stress-induced cardiomyopathy 02/27/2015    Vitamin D deficiency        Past Surgical History:   Procedure Laterality Date    CARPAL TUNNEL RELEASE  1989    CATARACT EXTRACTION      CHOLECYSTECTOMY  2001    CORONARY ANGIOPLASTY WITH STENT PLACEMENT N/A 2010    PTCA, stents, LAD    CORONARY ANGIOPLASTY WITH STENT PLACEMENT  2015    CORONARY ANGIOPLASTY WITH STENT PLACEMENT  2020    HEMORRHOIDECTOMY  1965    HYSTERECTOMY  1968    AGE 33    OOPHORECTOMY Bilateral     AGE 33       Family History   Problem Relation Age of Onset    Breast cancer Neg Hx     Ovarian cancer Neg Hx        Social History     Socioeconomic History    Marital status:    Tobacco Use    Smoking status: Never    Smokeless tobacco: Never   Substance and Sexual Activity    Alcohol use: No    Drug use: No    Sexual activity: Defer       Allergies   Allergen Reactions    Contrast Dye (Echo Or Unknown Ct/Mr) Anaphylaxis    Iodinated Contrast Media Unknown (See Comments), Other (See Comments) and Swelling     unknown  unknown  Tongue swelling  Contrast Dye     Doxycycline Diarrhea and Nausea And Vomiting    Ioversol Swelling     Tongue swelling  Contrast Dye     Pantoprazole Sodium Unknown (See Comments)     Unknown  Protonix     Perflutren Lipid Microsphere Unknown (See Comments)     Unknown  Definity     Clarithromycin Unknown (See Comments) and Other (See Comments)      Unknown  Biaxin  Unknown  Biaxin    Codeine Unknown (See Comments) and Other (See Comments)     unknown  unknown    Dobutamine Unknown (See Comments), Nausea Only and Other (See Comments)     unknown  unknown    Pantoprazole Other (See Comments) and Unknown (See Comments)     Unknown  Protonix     Perflutren Lipid Microspheres Other (See Comments)     Unknown  Definity     Sulfa Antibiotics Unknown (See Comments) and Other (See Comments)     Unknown  Bactrim  Unknown  Bactrim    Sulfamethoxazole-Trimethoprim Unknown (See Comments)    Trimethoprim Unknown (See Comments) and Other (See Comments)     Unknown  Bactrim  Unknown  Bactrim         Current Outpatient Medications:     ALPRAZolam (XANAX) 0.5 MG tablet, Take 1 tablet by mouth 2 (Two) Times a Day As Needed for Anxiety. for anxiety, Disp: 60 tablet, Rfl: 3    aspirin 81 MG EC tablet, Take 1 tablet by mouth Daily. Take one daily, Disp: , Rfl:     atorvastatin (LIPITOR) 10 MG tablet, Take 1 tablet by mouth Daily., Disp: 90 tablet, Rfl: 1    carvedilol (COREG) 6.25 MG tablet, Take 1 tablet by mouth 2 (Two) Times a Day With Meals., Disp: , Rfl:     Cholecalciferol (VITAMIN D3) 2000 units tablet, Take  by mouth. Take one daily, Disp: , Rfl:     clobetasol (TEMOVATE) 0.05 % external solution, Apply 1 Application topically to the appropriate area as directed 2 (Two) Times a Day., Disp: , Rfl:     Cyanocobalamin 1000 MCG/ML kit, Inject  as directed. Take injection once a month, Disp: , Rfl:     cyclobenzaprine (FLEXERIL) 10 MG tablet, Take 1 tablet by mouth 2 (Two) Times a Day As Needed for Muscle Spasms., Disp: 60 tablet, Rfl: 1    erythromycin (ROMYCIN) 5 MG/GM ophthalmic ointment, Administer 1 Application into the left eye 2 (Two) Times a Day., Disp: , Rfl:     glucose blood (FREESTYLE LITE) test strip, Check once daily E11.9, Disp: 100 each, Rfl: 1    hydrOXYzine (ATARAX) 25 MG tablet, Take 1 tablet by mouth 3 (Three) Times a Day As Needed for Itching., Disp: 30  tablet, Rfl: 5    isosorbide mononitrate (IMDUR) 30 MG 24 hr tablet, Take 0.5 tablets by mouth Daily., Disp: , Rfl:     Lancets (freestyle) lancets, Use once daily as instructed for blood sugar testing, Disp: 100 each, Rfl: 1    lisinopril (PRINIVIL,ZESTRIL) 10 MG tablet, Take 1 tablet by mouth Daily., Disp: 90 tablet, Rfl: 3    metFORMIN (GLUCOPHAGE) 1000 MG tablet, Take 1 tablet by mouth 2 (Two) Times a Day With Meals., Disp: 180 tablet, Rfl: 3    Multiple Vitamins-Minerals (ICAPS AREDS 2 PO), Take 1 capsule by mouth Daily., Disp: , Rfl:     nitroglycerin (NITROSTAT) 0.4 MG SL tablet, Place 1 tablet under the tongue As Needed for Chest Pain. Take no more than 3 doses in 15 minutes., Disp: 25 tablet, Rfl: 5    nystatin (MYCOSTATIN) 627249 UNIT/GM cream, Apply  topically to the appropriate area as directed Daily As Needed (yeast infection)., Disp: 30 g, Rfl: 3    nystatin (MYCOSTATIN) 434014 UNIT/GM powder, Apply  topically to the appropriate area as directed 2 (Two) Times a Day., Disp: 60 g, Rfl: 5    promethazine (PHENERGAN) 25 MG tablet, Take 1 tablet by mouth Every 8 (Eight) Hours As Needed for Nausea or Vomiting. (Patient taking differently: Take 1 tablet by mouth Every 6 (Six) Hours As Needed for Nausea or Vomiting.), Disp: 30 tablet, Rfl: 1    ticagrelor (BRILINTA) 60 MG tablet tablet, Take 1 tablet by mouth., Disp: , Rfl:     valACYclovir (Valtrex) 1000 MG tablet, Take 1 tablet by mouth 2 (Two) Times a Day., Disp: 20 tablet, Rfl: 0    ezetimibe (ZETIA) 10 MG tablet, Take 1 tablet by mouth Daily., Disp: , Rfl:     Current Facility-Administered Medications:     cyanocobalamin injection 1,000 mcg, 1,000 mcg, Intramuscular, Q28 Days, Marcus Linares MD    Review of Systems   Constitutional:  Negative for chills, fatigue and fever.   HENT:  Negative for congestion, ear pain and sinus pressure.    Respiratory:  Negative for cough, chest tightness, shortness of breath and wheezing.    Cardiovascular:  Negative  "for chest pain and palpitations.   Gastrointestinal:  Negative for abdominal pain, blood in stool and constipation.   Skin:  Negative for color change.   Allergic/Immunologic: Negative for environmental allergies.   Neurological:  Negative for dizziness, speech difficulty and headache.   Psychiatric/Behavioral:  Negative for decreased concentration. The patient is not nervous/anxious.         Vital Signs  Vitals:    02/29/24 1016   BP: 106/60   BP Location: Right arm   Patient Position: Sitting   Cuff Size: Adult   Pulse: 74   Temp: 97.8 °F (36.6 °C)   TempSrc: Infrared   Weight: 57.9 kg (127 lb 9.6 oz)   Height: 160 cm (62.99\")   PainSc:   8   PainLoc: Shoulder       Physical Exam  Vitals reviewed.   Constitutional:       Appearance: She is well-developed.   HENT:      Head: Normocephalic and atraumatic.   Neck:      Thyroid: No thyromegaly.      Vascular: No carotid bruit.   Cardiovascular:      Rate and Rhythm: Normal rate and regular rhythm.      Heart sounds: Normal heart sounds. No murmur heard.     No friction rub. No gallop.   Pulmonary:      Effort: Pulmonary effort is normal.      Comments: Slightly reduced breath sounds.  Musculoskeletal:      Cervical back: Normal range of motion and neck supple.      Comments: She has significant tenderness to palpation in the right trapezius. She has good range of motion of the shoulder.          Procedures    ACE III MINI             Assessment/Plan:    Diagnoses and all orders for this visit:    1. Diabetic nephropathy associated with type 2 diabetes mellitus (Primary)  -     Comprehensive Metabolic Panel; Future  -     Hemoglobin A1c; Future    2. Primary hypertension  -     CBC & Differential; Future    3. Stage 3a chronic kidney disease    4. Muscle strain  -     cyclobenzaprine (FLEXERIL) 10 MG tablet; Take 1 tablet by mouth 2 (Two) Times a Day As Needed for Muscle Spasms.  Dispense: 60 tablet; Refill: 1  -     triamcinolone acetonide (KENALOG-40) injection 80 " mg    5. B12 deficiency  -     Vitamin B12; Future    6. Vitamin D deficiency  -     Vitamin D,25-Hydroxy; Future    7. Mixed hyperlipidemia  -     Lipid Panel; Future      1. Diabetes.  A1c is pending. Continue metformin and a healthy diet.    2. Hypertension.  Blood pressure is well controlled on carvedilol and lisinopril.    3. Chronic kidney disease.  GFR is pending. The treatment remains controlled with blood pressure and blood glucose and avoidance of NSAIDs.    4. Right shoulder pain.  We will go ahead and give parenteral triamcinolone empirically today. We will increase her cyclobenzaprine to 10 mg twice a day as needed. She will continue taking 1000 mg of acetaminophen every 8 hours, and I have suggested adding heat to ice.    5. B12 deficiency.  B12 level is pending.    6. Hyperlipidemia.  Lipid panel is pending. Continue atorvastatin and healthy diet.    Follow-up  The patient will follow up in 6 months.    Plan of care reviewed with patient at the conclusion of today's visit. Education was provided regarding diagnosis, management, and any prescribed or recommended OTC medications.Patient verbalizes understanding of and agreement with management plan.       Transcribed from ambient dictation for Kishore Grande MD by Rocky Barajas.  02/29/24   14:25 EST    Patient or patient representative verbalized consent to the visit recording.  I have personally performed the services described in this document as transcribed by the above individual, and it is both accurate and complete.

## 2024-03-01 LAB
25(OH)D3 SERPL-MCNC: 30 NG/ML (ref 30–100)
ALBUMIN SERPL-MCNC: 4.3 G/DL (ref 3.5–5.2)
ALBUMIN/GLOB SERPL: 1.4 G/DL
ALP SERPL-CCNC: 55 U/L (ref 39–117)
ALT SERPL W P-5'-P-CCNC: 8 U/L (ref 1–33)
ANION GAP SERPL CALCULATED.3IONS-SCNC: 12 MMOL/L (ref 5–15)
AST SERPL-CCNC: 12 U/L (ref 1–32)
BASOPHILS # BLD AUTO: 0.09 10*3/MM3 (ref 0–0.2)
BASOPHILS NFR BLD AUTO: 0.9 % (ref 0–1.5)
BILIRUB SERPL-MCNC: 0.2 MG/DL (ref 0–1.2)
BUN SERPL-MCNC: 24 MG/DL (ref 8–23)
BUN/CREAT SERPL: 21.4 (ref 7–25)
CALCIUM SPEC-SCNC: 10 MG/DL (ref 8.6–10.5)
CHLORIDE SERPL-SCNC: 105 MMOL/L (ref 98–107)
CHOLEST SERPL-MCNC: 191 MG/DL (ref 0–200)
CO2 SERPL-SCNC: 21 MMOL/L (ref 22–29)
CREAT SERPL-MCNC: 1.12 MG/DL (ref 0.57–1)
DEPRECATED RDW RBC AUTO: 42.5 FL (ref 37–54)
EGFRCR SERPLBLD CKD-EPI 2021: 48 ML/MIN/1.73
EOSINOPHIL # BLD AUTO: 0.33 10*3/MM3 (ref 0–0.4)
EOSINOPHIL NFR BLD AUTO: 3.4 % (ref 0.3–6.2)
ERYTHROCYTE [DISTWIDTH] IN BLOOD BY AUTOMATED COUNT: 13.9 % (ref 12.3–15.4)
GLOBULIN UR ELPH-MCNC: 3 GM/DL
GLUCOSE SERPL-MCNC: 108 MG/DL (ref 65–99)
HBA1C MFR BLD: 6.2 % (ref 4.8–5.6)
HCT VFR BLD AUTO: 32.9 % (ref 34–46.6)
HDLC SERPL-MCNC: 62 MG/DL (ref 40–60)
HGB BLD-MCNC: 10.5 G/DL (ref 12–15.9)
IMM GRANULOCYTES # BLD AUTO: 0.05 10*3/MM3 (ref 0–0.05)
IMM GRANULOCYTES NFR BLD AUTO: 0.5 % (ref 0–0.5)
LDLC SERPL CALC-MCNC: 102 MG/DL (ref 0–100)
LDLC/HDLC SERPL: 1.57 {RATIO}
LYMPHOCYTES # BLD AUTO: 2.92 10*3/MM3 (ref 0.7–3.1)
LYMPHOCYTES NFR BLD AUTO: 30.3 % (ref 19.6–45.3)
MCH RBC QN AUTO: 27.5 PG (ref 26.6–33)
MCHC RBC AUTO-ENTMCNC: 31.9 G/DL (ref 31.5–35.7)
MCV RBC AUTO: 86.1 FL (ref 79–97)
MONOCYTES # BLD AUTO: 0.63 10*3/MM3 (ref 0.1–0.9)
MONOCYTES NFR BLD AUTO: 6.5 % (ref 5–12)
NEUTROPHILS NFR BLD AUTO: 5.62 10*3/MM3 (ref 1.7–7)
NEUTROPHILS NFR BLD AUTO: 58.4 % (ref 42.7–76)
NRBC BLD AUTO-RTO: 0 /100 WBC (ref 0–0.2)
PLATELET # BLD AUTO: 349 10*3/MM3 (ref 140–450)
PMV BLD AUTO: 9.9 FL (ref 6–12)
POTASSIUM SERPL-SCNC: 4.7 MMOL/L (ref 3.5–5.2)
PROT SERPL-MCNC: 7.3 G/DL (ref 6–8.5)
RBC # BLD AUTO: 3.82 10*6/MM3 (ref 3.77–5.28)
SODIUM SERPL-SCNC: 138 MMOL/L (ref 136–145)
TRIGL SERPL-MCNC: 157 MG/DL (ref 0–150)
VIT B12 BLD-MCNC: 246 PG/ML (ref 211–946)
VLDLC SERPL-MCNC: 27 MG/DL (ref 5–40)
WBC NRBC COR # BLD AUTO: 9.64 10*3/MM3 (ref 3.4–10.8)

## 2024-07-03 RX ORDER — PROMETHAZINE HYDROCHLORIDE 25 MG/1
25 TABLET ORAL EVERY 8 HOURS PRN
Qty: 30 TABLET | Refills: 1 | Status: SHIPPED | OUTPATIENT
Start: 2024-07-03

## 2024-07-03 RX ORDER — HYDROXYZINE HYDROCHLORIDE 25 MG/1
25 TABLET, FILM COATED ORAL 3 TIMES DAILY PRN
Qty: 30 TABLET | Refills: 5 | Status: SHIPPED | OUTPATIENT
Start: 2024-07-03

## 2024-07-03 NOTE — TELEPHONE ENCOUNTER
"Caller: Tessa Adler \"Pat\"    Relationship: Self    Best call back number: [phone]     Requested Prescriptions:   Requested Prescriptions     Pending Prescriptions Disp Refills    hydrOXYzine (ATARAX) 25 MG tablet 30 tablet 5     Sig: Take 1 tablet by mouth 3 (Three) Times a Day As Needed for Itching.    promethazine (PHENERGAN) 25 MG tablet 30 tablet 1     Sig: Take 1 tablet by mouth Every 8 (Eight) Hours As Needed for Nausea or Vomiting.        Pharmacy where request should be sent: [address] [phone] [facility] [phone]      Last office visit with prescribing clinician: 2/29/2024   Last telemedicine visit with prescribing clinician: Visit date not found   Next office visit with prescribing clinician: 9/4/2024     Additional details provided by patient:     Does the patient have less than a 3 day supply:  [x] Yes  [] No    Would you like a call back once the refill request has been completed: [x] Yes [] No    If the office needs to give you a call back, can they leave a voicemail: [x] Yes [] No    Lewis Zazueta   07/03/24 08:49 EDT       "

## 2024-07-03 NOTE — TELEPHONE ENCOUNTER
Rx Refill Note  Requested Prescriptions     Pending Prescriptions Disp Refills    hydrOXYzine (ATARAX) 25 MG tablet 30 tablet 5     Sig: Take 1 tablet by mouth 3 (Three) Times a Day As Needed for Itching.    promethazine (PHENERGAN) 25 MG tablet 30 tablet 1     Sig: Take 1 tablet by mouth Every 8 (Eight) Hours As Needed for Nausea or Vomiting.      Last office visit with prescribing clinician: 2/29/2024   Last telemedicine visit with prescribing clinician: Visit date not found   Next office visit with prescribing clinician: 9/4/2024                         Would you like a call back once the refill request has been completed: [] Yes [] No    If the office needs to give you a call back, can they leave a voicemail: [] Yes [] No    Khushbu Manzano MA  07/03/24, 09:44 EDTRx Refill Note  Requested Prescriptions     Pending Prescriptions Disp Refills    hydrOXYzine (ATARAX) 25 MG tablet 30 tablet 5     Sig: Take 1 tablet by mouth 3 (Three) Times a Day As Needed for Itching.    promethazine (PHENERGAN) 25 MG tablet 30 tablet 1     Sig: Take 1 tablet by mouth Every 8 (Eight) Hours As Needed for Nausea or Vomiting.      Last office visit with prescribing clinician: 2/29/2024   Last telemedicine visit with prescribing clinician: Visit date not found   Next office visit with prescribing clinician: 9/4/2024                         Would you like a call back once the refill request has been completed: [] Yes [] No    If the office needs to give you a call back, can they leave a voicemail: [] Yes [] No    Khushbu Manzano MA  07/03/24, 09:44 EDT

## 2024-07-11 DIAGNOSIS — E11.21 DIABETIC NEPHROPATHY ASSOCIATED WITH TYPE 2 DIABETES MELLITUS: ICD-10-CM

## 2024-07-11 RX ORDER — BLOOD-GLUCOSE METER
KIT MISCELLANEOUS
Qty: 50 EACH | Refills: 1 | Status: SHIPPED | OUTPATIENT
Start: 2024-07-11

## 2024-07-11 NOTE — TELEPHONE ENCOUNTER
Caller: [facility] [address] [place] [address] [address] - [phone] [facility] [phone] FX    Relationship: Pharmacy    Best call back number: [phone]     Requested Prescriptions:   Requested Prescriptions     Pending Prescriptions Disp Refills    glucose blood (FREESTYLE LITE) test strip 100 each 1     Sig: Check once daily E11.9        Pharmacy where request should be sent: [facility] [address] [place] [address] [address] - [phone] [facility] [phone] FX     Last office visit with prescribing clinician: 2/29/2024   Last telemedicine visit with prescribing clinician: Visit date not found   Next office visit with prescribing clinician: 9/4/2024     Additional details provided by patient: PER INSURANCE, PRESCRIPTION NEEDS WRITTEN AS QUANTITY 50 WITH 1 REFILL.      Does the patient have less than a 3 day supply:  [x] Yes  [] No    Would you like a call back once the refill request has been completed: [] Yes [x] No    If the office needs to give you a call back, can they leave a voicemail: [] Yes [x] No    Amena Dunne   07/11/24 11:03 EDT

## 2024-08-15 DIAGNOSIS — F41.9 ANXIETY: ICD-10-CM

## 2024-08-15 RX ORDER — ALPRAZOLAM 0.5 MG/1
0.5 TABLET ORAL 2 TIMES DAILY PRN
Qty: 60 TABLET | Refills: 3 | Status: SHIPPED | OUTPATIENT
Start: 2024-08-15

## 2024-08-15 NOTE — TELEPHONE ENCOUNTER
"  Caller: Tessa Adler \"Pat\"    Relationship: Self    Best call back number: [phone]     Requested Prescriptions:   Requested Prescriptions     Pending Prescriptions Disp Refills    ALPRAZolam (XANAX) 0.5 MG tablet 60 tablet 3     Sig: Take 1 tablet by mouth 2 (Two) Times a Day As Needed for Anxiety. for anxiety        Pharmacy where request should be sent: [address] [phone] [facility] [phone]      Last office visit with prescribing clinician: 2/29/2024   Last telemedicine visit with prescribing clinician: Visit date not found   Next office visit with prescribing clinician: 9/4/2024     Additional details provided by patient: 6 DAYS LEFT    Does the patient have less than a 3 day supply:  [] Yes  [x] No    Would you like a call back once the refill request has been completed: [] Yes [x] No    If the office needs to give you a call back, can they leave a voicemail: [] Yes [x] No    Lewis Caputo Rep   08/15/24 11:48 EDT           "

## 2024-09-03 DIAGNOSIS — E11.21 DIABETIC NEPHROPATHY ASSOCIATED WITH TYPE 2 DIABETES MELLITUS: ICD-10-CM

## 2024-09-03 RX ORDER — BLOOD-GLUCOSE METER
KIT MISCELLANEOUS
Qty: 100 EACH | Refills: 1 | Status: SHIPPED | OUTPATIENT
Start: 2024-09-03

## 2024-09-04 ENCOUNTER — OFFICE VISIT (OUTPATIENT)
Dept: INTERNAL MEDICINE | Facility: CLINIC | Age: 89
End: 2024-09-04
Payer: MEDICARE

## 2024-09-04 ENCOUNTER — LAB (OUTPATIENT)
Dept: LAB | Facility: HOSPITAL | Age: 89
End: 2024-09-04
Payer: MEDICARE

## 2024-09-04 VITALS
HEIGHT: 63 IN | HEART RATE: 77 BPM | OXYGEN SATURATION: 98 % | SYSTOLIC BLOOD PRESSURE: 108 MMHG | DIASTOLIC BLOOD PRESSURE: 60 MMHG | BODY MASS INDEX: 22.71 KG/M2 | TEMPERATURE: 98 F | WEIGHT: 128.2 LBS

## 2024-09-04 DIAGNOSIS — N18.31 STAGE 3A CHRONIC KIDNEY DISEASE: ICD-10-CM

## 2024-09-04 DIAGNOSIS — I25.10 ASHD (ARTERIOSCLEROTIC HEART DISEASE): ICD-10-CM

## 2024-09-04 DIAGNOSIS — E53.8 B12 DEFICIENCY: ICD-10-CM

## 2024-09-04 DIAGNOSIS — E11.21 DIABETIC NEPHROPATHY ASSOCIATED WITH TYPE 2 DIABETES MELLITUS: ICD-10-CM

## 2024-09-04 DIAGNOSIS — E55.9 VITAMIN D DEFICIENCY: ICD-10-CM

## 2024-09-04 DIAGNOSIS — I10 PRIMARY HYPERTENSION: ICD-10-CM

## 2024-09-04 DIAGNOSIS — Z00.00 MEDICARE ANNUAL WELLNESS VISIT, SUBSEQUENT: Primary | ICD-10-CM

## 2024-09-04 DIAGNOSIS — E78.2 MIXED HYPERLIPIDEMIA: ICD-10-CM

## 2024-09-04 LAB
25(OH)D3 SERPL-MCNC: 27.2 NG/ML (ref 30–100)
ALBUMIN SERPL-MCNC: 4.2 G/DL (ref 3.5–5.2)
ALBUMIN UR-MCNC: <1.2 MG/DL
ALBUMIN/GLOB SERPL: 1.4 G/DL
ALP SERPL-CCNC: 65 U/L (ref 39–117)
ALT SERPL W P-5'-P-CCNC: 5 U/L (ref 1–33)
ANION GAP SERPL CALCULATED.3IONS-SCNC: 10 MMOL/L (ref 5–15)
AST SERPL-CCNC: 11 U/L (ref 1–32)
BASOPHILS # BLD AUTO: 0.1 10*3/MM3 (ref 0–0.2)
BASOPHILS NFR BLD AUTO: 1.2 % (ref 0–1.5)
BILIRUB SERPL-MCNC: 0.2 MG/DL (ref 0–1.2)
BUN SERPL-MCNC: 21 MG/DL (ref 8–23)
BUN/CREAT SERPL: 19.3 (ref 7–25)
CALCIUM SPEC-SCNC: 10 MG/DL (ref 8.6–10.5)
CHLORIDE SERPL-SCNC: 105 MMOL/L (ref 98–107)
CHOLEST SERPL-MCNC: 200 MG/DL (ref 0–200)
CO2 SERPL-SCNC: 23 MMOL/L (ref 22–29)
CREAT SERPL-MCNC: 1.09 MG/DL (ref 0.57–1)
CREAT UR-MCNC: 78.9 MG/DL
DEPRECATED RDW RBC AUTO: 42.3 FL (ref 37–54)
EGFRCR SERPLBLD CKD-EPI 2021: 49.3 ML/MIN/1.73
EOSINOPHIL # BLD AUTO: 0.44 10*3/MM3 (ref 0–0.4)
EOSINOPHIL NFR BLD AUTO: 5.5 % (ref 0.3–6.2)
ERYTHROCYTE [DISTWIDTH] IN BLOOD BY AUTOMATED COUNT: 13.7 % (ref 12.3–15.4)
GLOBULIN UR ELPH-MCNC: 2.9 GM/DL
GLUCOSE SERPL-MCNC: 99 MG/DL (ref 65–99)
HBA1C MFR BLD: 6.4 % (ref 4.8–5.6)
HCT VFR BLD AUTO: 32.1 % (ref 34–46.6)
HDLC SERPL-MCNC: 53 MG/DL (ref 40–60)
HGB BLD-MCNC: 10.4 G/DL (ref 12–15.9)
IMM GRANULOCYTES # BLD AUTO: 0.04 10*3/MM3 (ref 0–0.05)
IMM GRANULOCYTES NFR BLD AUTO: 0.5 % (ref 0–0.5)
LDLC SERPL CALC-MCNC: 111 MG/DL (ref 0–100)
LDLC/HDLC SERPL: 1.99 {RATIO}
LYMPHOCYTES # BLD AUTO: 2.44 10*3/MM3 (ref 0.7–3.1)
LYMPHOCYTES NFR BLD AUTO: 30.4 % (ref 19.6–45.3)
MCH RBC QN AUTO: 27.7 PG (ref 26.6–33)
MCHC RBC AUTO-ENTMCNC: 32.4 G/DL (ref 31.5–35.7)
MCV RBC AUTO: 85.6 FL (ref 79–97)
MICROALBUMIN/CREAT UR: NORMAL MG/G{CREAT}
MONOCYTES # BLD AUTO: 0.58 10*3/MM3 (ref 0.1–0.9)
MONOCYTES NFR BLD AUTO: 7.2 % (ref 5–12)
NEUTROPHILS NFR BLD AUTO: 4.43 10*3/MM3 (ref 1.7–7)
NEUTROPHILS NFR BLD AUTO: 55.2 % (ref 42.7–76)
NRBC BLD AUTO-RTO: 0 /100 WBC (ref 0–0.2)
PLATELET # BLD AUTO: 323 10*3/MM3 (ref 140–450)
PMV BLD AUTO: 9.9 FL (ref 6–12)
POTASSIUM SERPL-SCNC: 4.7 MMOL/L (ref 3.5–5.2)
PROT SERPL-MCNC: 7.1 G/DL (ref 6–8.5)
RBC # BLD AUTO: 3.75 10*6/MM3 (ref 3.77–5.28)
SODIUM SERPL-SCNC: 138 MMOL/L (ref 136–145)
TRIGL SERPL-MCNC: 207 MG/DL (ref 0–150)
VIT B12 BLD-MCNC: 346 PG/ML (ref 211–946)
VLDLC SERPL-MCNC: 36 MG/DL (ref 5–40)
WBC NRBC COR # BLD AUTO: 8.03 10*3/MM3 (ref 3.4–10.8)

## 2024-09-04 PROCEDURE — 82043 UR ALBUMIN QUANTITATIVE: CPT

## 2024-09-04 PROCEDURE — 80053 COMPREHEN METABOLIC PANEL: CPT

## 2024-09-04 PROCEDURE — 85025 COMPLETE CBC W/AUTO DIFF WBC: CPT

## 2024-09-04 PROCEDURE — 80061 LIPID PANEL: CPT

## 2024-09-04 PROCEDURE — 83036 HEMOGLOBIN GLYCOSYLATED A1C: CPT

## 2024-09-04 PROCEDURE — 82306 VITAMIN D 25 HYDROXY: CPT

## 2024-09-04 PROCEDURE — 82570 ASSAY OF URINE CREATININE: CPT

## 2024-09-04 PROCEDURE — 82607 VITAMIN B-12: CPT

## 2024-09-04 RX ORDER — LISINOPRIL 10 MG/1
10 TABLET ORAL DAILY
Qty: 90 TABLET | Refills: 3 | Status: SHIPPED | OUTPATIENT
Start: 2024-09-04

## 2024-09-04 NOTE — PROGRESS NOTES
" Subjective   The ABCs of the Annual Wellness Visit  Medicare Wellness Visit      Tessa Adler is a 89 y.o. patient who presents for a Medicare Wellness Visit.    The following portions of the patient's history were reviewed and   updated as appropriate: {history reviewed:20406::\"allergies\",\"current medications\",\"past family history\",\"past medical history\",\"past social history\",\"past surgical history\",\"problem list\"}.    Compared to one year ago, the patient's physical   health is {better worse same:36583}.  Compared to one year ago, the patient's mental   health is {better worse same:32085}.    Recent Hospitalizations:  {Hospital Admission Status in the last 365 days:35859}    Current Medical Providers:  Patient Care Team:  Kishore Grande MD as PCP - General  Kishore Grande MD as PCP - Family Medicine    Outpatient Medications Prior to Visit   Medication Sig Dispense Refill    ALPRAZolam (XANAX) 0.5 MG tablet Take 1 tablet by mouth 2 (Two) Times a Day As Needed for Anxiety. for anxiety 60 tablet 3    aspirin 81 MG EC tablet Take 1 tablet by mouth Daily. Take one daily      atorvastatin (LIPITOR) 10 MG tablet Take 1 tablet by mouth Daily. 90 tablet 1    carvedilol (COREG) 6.25 MG tablet Take 1 tablet by mouth 2 (Two) Times a Day With Meals.      Cholecalciferol (VITAMIN D3) 2000 units tablet Take  by mouth. Take one daily      ezetimibe (ZETIA) 10 MG tablet Take 1 tablet by mouth Daily.      hydrOXYzine (ATARAX) 25 MG tablet Take 1 tablet by mouth 3 (Three) Times a Day As Needed for Itching. 30 tablet 5    isosorbide mononitrate (IMDUR) 30 MG 24 hr tablet Take 0.5 tablets by mouth Daily.      Lancets (freestyle) lancets Use once daily as instructed for blood sugar testing 100 each 1    Multiple Vitamins-Minerals (ICAPS AREDS 2 PO) Take 1 capsule by mouth Daily.      nitroglycerin (NITROSTAT) 0.4 MG SL tablet Place 1 tablet under the tongue As Needed for Chest Pain. Take no more than 3 doses " in 15 minutes. 25 tablet 5    nystatin (MYCOSTATIN) 327711 UNIT/GM powder Apply  topically to the appropriate area as directed 2 (Two) Times a Day. 60 g 5    promethazine (PHENERGAN) 25 MG tablet Take 1 tablet by mouth Every 8 (Eight) Hours As Needed for Nausea or Vomiting. 30 tablet 1    ticagrelor (BRILINTA) 60 MG tablet tablet Take 1 tablet by mouth.      lisinopril (PRINIVIL,ZESTRIL) 10 MG tablet Take 1 tablet by mouth Daily. 90 tablet 3    metFORMIN (GLUCOPHAGE) 1000 MG tablet Take 1 tablet by mouth 2 (Two) Times a Day With Meals. 180 tablet 3    glucose blood (FREESTYLE LITE) test strip Check once daily E11.9 100 each 1    clobetasol (TEMOVATE) 0.05 % external solution Apply 1 Application topically to the appropriate area as directed 2 (Two) Times a Day. (Patient not taking: Reported on 9/4/2024)      Cyanocobalamin 1000 MCG/ML kit Inject  as directed. Take injection once a month (Patient not taking: Reported on 9/4/2024)      cyclobenzaprine (FLEXERIL) 10 MG tablet Take 1 tablet by mouth 2 (Two) Times a Day As Needed for Muscle Spasms. (Patient not taking: Reported on 9/4/2024) 60 tablet 1    erythromycin (ROMYCIN) 5 MG/GM ophthalmic ointment Administer 1 Application into the left eye 2 (Two) Times a Day. (Patient not taking: Reported on 9/4/2024)      nystatin (MYCOSTATIN) 898431 UNIT/GM cream Apply  topically to the appropriate area as directed Daily As Needed (yeast infection). (Patient not taking: Reported on 9/4/2024) 30 g 3    valACYclovir (Valtrex) 1000 MG tablet Take 1 tablet by mouth 2 (Two) Times a Day. (Patient not taking: Reported on 9/4/2024) 20 tablet 0     Facility-Administered Medications Prior to Visit   Medication Dose Route Frequency Provider Last Rate Last Admin    cyanocobalamin injection 1,000 mcg  1,000 mcg Intramuscular Q28 Days Marcus Linares MD         No opioid medication identified on active medication list. I have reviewed chart for other potential  high risk medication/s and  "harmful drug interactions in the elderly.      Aspirin is on active medication list. {ASPIRIN ON MEDICATION LIST INDICATED/NOT INDICATED:43120}.      Patient Active Problem List   Diagnosis    B12 deficiency    Mixed hyperlipidemia    GERD without esophagitis    Primary hypertension    ASHD (arteriosclerotic heart disease)    Vitamin D deficiency    CKD (chronic kidney disease), stage III    Annual physical exam    BMI 25.0-25.9,adult    Diabetic nephropathy associated with type 2 diabetes mellitus    Idiopathic osteoarthritis    Insomnia    Irritable bowel syndrome    Medicare annual wellness visit, subsequent    Osteoarthritis    Postmenopausal osteoporosis    Trigger finger of both hands    Diabetic polyneuropathy associated with type 2 diabetes mellitus    Spondylolisthesis at L4-L5 level    Yeast infection     Advance Care Planning {Advance Care Planning Hyperlink:23}Advance Directive is not on file.  {ACP Discussion, Advance Directive not in EMR:95616}            Objective   Vitals:    09/04/24 1011   BP: 108/60   BP Location: Left arm   Patient Position: Sitting   Cuff Size: Adult   Pulse: 77   Temp: 98 °F (36.7 °C)   SpO2: 98%   Weight: 58.2 kg (128 lb 3.2 oz)   Height: 160 cm (62.99\")   PainSc: 0-No pain       Estimated body mass index is 22.72 kg/m² as calculated from the following:    Height as of this encounter: 160 cm (62.99\").    Weight as of this encounter: 58.2 kg (128 lb 3.2 oz).    BMI is within normal parameters. No other follow-up for BMI required.       Does the patient have evidence of cognitive impairment? {Yes/No:84090}  Lab Results   Component Value Date    TRIG 207 (H) 09/04/2024    HDL 53 09/04/2024     (H) 09/04/2024    VLDL 36 09/04/2024    HGBA1C 6.40 (H) 09/04/2024                                                                                                Health  Risk Assessment    Smoking Status:  Social History     Tobacco Use   Smoking Status Never   Smokeless Tobacco " Never     Alcohol Consumption:  Social History     Substance and Sexual Activity   Alcohol Use No       Fall Risk Screen{Jump to Steadi Fall Risk Flowsheet:23}  STEADI Fall Risk Assessment was completed, and patient is at HIGH risk for falls. Assessment completed on:2024    Depression Screenin/4/2024    10:10 AM   PHQ-2/PHQ-9 Depression Screening   Little Interest or Pleasure in Doing Things 0-->not at all   Feeling Down, Depressed or Hopeless 0-->not at all   PHQ-9: Brief Depression Severity Measure Score 0     Health Habits and Functional and Cognitive Screenin/4/2024    10:08 AM   Functional & Cognitive Status   Do you have difficulty preparing food and eating? No   Do you have difficulty bathing yourself, getting dressed or grooming yourself? No   Do you have difficulty using the toilet? No   Do you have difficulty moving around from place to place? No   Do you have trouble with steps or getting out of a bed or a chair? Yes   Current Diet Well Balanced Diet   Dental Exam Up to date   Eye Exam Up to date   Exercise (times per week) 0 times per week   Current Exercises Include No Regular Exercise   Do you need help using the phone?  No   Are you deaf or do you have serious difficulty hearing?  No   Do you need help to go to places out of walking distance? No   Do you need help shopping? No   Do you need help preparing meals?  No   Do you need help with housework?  No   Do you need help with laundry? No   Do you need help taking your medications? No   Do you need help managing money? No   Do you ever drive or ride in a car without wearing a seat belt? No   Have you felt unusual stress, anger or loneliness in the last month? No   Who do you live with? Spouse   If you need help, do you have trouble finding someone available to you? No   Have you been bothered in the last four weeks by sexual problems? No   Do you have difficulty concentrating, remembering or making decisions? No            Age-appropriate Screening Schedule:  Refer to the list below for future screening recommendations based on patient's age, sex and/or medical conditions. Orders for these recommended tests are listed in the plan section. The patient has been provided with a written plan.    Health Maintenance List  Health Maintenance   Topic Date Due    ZOSTER VACCINE (1 of 2) Never done    RSV Vaccine - Adults (1 - 1-dose 60+ series) Never done    DXA SCAN  08/01/2018    ANNUAL WELLNESS VISIT  08/30/2024    COVID-19 Vaccine (4 - 2023-24 season) 09/01/2024    INFLUENZA VACCINE  08/01/2024    HEMOGLOBIN A1C  03/04/2025    DIABETIC EYE EXAM  03/14/2025    LIPID PANEL  09/04/2025    URINE MICROALBUMIN  09/04/2025    TDAP/TD VACCINES (2 - Td or Tdap) 11/08/2027    Pneumococcal Vaccine 65+  Completed                                                                                                                                                CMS Preventative Services Quick Reference  Risk Factors Identified During Encounter  {Medicare Wellness Risk Factors:89975}    The above risks/problems have been discussed with the patient.  Pertinent information has been shared with the patient in the After Visit Summary.  An After Visit Summary and PPPS were made available to the patient.    Follow Up:{Wrapup  Review (Popup)  Advance Care Planning  Labs  CC  Problem List  Visit Diagnosis  Medications  Result Review  Imaging  Health Maintenance  Quality  BestPractice  SmartSets  SnapShot  Encounters  Notes  Media  Procedures :23}   Next Medicare Wellness visit to be scheduled in 1 year.         Additional E&M Note during same encounter follows:  Patient has additional, significant, and separately identifiable condition(s)/problem(s) that require work above and beyond the Medicare Wellness Visit     Chief Complaint  Medicare Wellness-subsequent and Diabetes    Subjective {Problem List  Visit Diagnosis   Encounters  Notes   "Medications  Labs  Result Review Imaging  Media :23}{Help Text;  If performing a Preventative Medicine Visit (e.g. 99397) in addition to AWV, choose the 1st SmartList option below and document any ROS/PE performed.  If performing a separately identifiable E/M service (e.g. 40722), choose 2nd SmartLink option below. This information in red will not appear in the final note after note is signed:91617}  HPI  {AWV/Preventative Exam/EM Progress Note. Use this note if billing for additional Wellness Visit or EM exam in addition to AWV visit (Optional):[id number]}                Objective   Vital Signs:  /60 (BP Location: Left arm, Patient Position: Sitting, Cuff Size: Adult)   Pulse 77   Temp 98 °F (36.7 °C)   Ht 160 cm (62.99\")   Wt 58.2 kg (128 lb 3.2 oz)   SpO2 98%   BMI 22.72 kg/m²   Physical Exam  Cardiovascular:      Pulses:           Dorsalis pedis pulses are 1+ on the right side and 1+ on the left side.   Musculoskeletal:      Right foot: No deformity.      Left foot: No deformity.   Feet:      Right foot:      Protective Sensation: 5 sites tested.  5 sites sensed.      Skin integrity: Skin integrity normal.      Left foot:      Protective Sensation: 5 sites tested.  5 sites sensed.      Skin integrity: Skin integrity normal.      Comments: Sensation in both feet absent to monofilament.     Diabetic Foot Exam Performed and Monofilament Test Performed          {The following data was reviewed by (Optional):17871}        Assessment and Plan {CC Problem List  Visit Diagnosis   ROS  Review (Popup)  Health Maintenance  Quality  BestPractice  Medications  SmartSets  SnapShot Encounters  Media :23}{Help Text; When performing an adult Preventative Medicine Visit (e.g. 99397) using the optional SmartList below can help when documenting any age-appropriate advice given.  When using the SmartList review and update the information based on the unique discussion or advice given to the patient.  As a " reminder, diagnosis code Z00.00 (nl exam) or Z00.01 (abnl exam), should be added when this service is performed.  Text will disappear once the note is signed:16452}{Preventative Physical Additional Health Advice List (Optional):8733880852}              Medicare annual wellness visit, subsequent    Primary hypertension  {Hypertension is (optional):3905147997}  Mixed hyperlipidemia   {Hyperlipidemia A/P Block (Optional):4279475519}  ASHD (arteriosclerotic heart disease)  {Coronary Artery Disease (OPTIONAL):12817}  B12 deficiency    Diabetic nephropathy associated with type 2 diabetes mellitus  {Diabetes (Optional):6096837994}  Stage 3a chronic kidney disease  {Renal Disease A/P (Optional):95467}  Vitamin D deficiency      Orders Placed This Encounter   Procedures    Comprehensive Metabolic Panel     Standing Status:   Future     Number of Occurrences:   1     Standing Expiration Date:   9/4/2025     Order Specific Question:   Release to patient     Answer:   Routine Release [[id number]]    Hemoglobin A1c     Standing Status:   Future     Number of Occurrences:   1     Standing Expiration Date:   9/4/2025     Order Specific Question:   Release to patient     Answer:   Routine Release [[id number]]    Lipid Panel     Standing Status:   Future     Number of Occurrences:   1     Standing Expiration Date:   9/4/2025     Order Specific Question:   Release to patient     Answer:   Routine Release [[id number]]    Microalbumin / Creatinine Urine Ratio - Urine, Clean Catch     Standing Status:   Future     Number of Occurrences:   1     Standing Expiration Date:   9/4/2025     Order Specific Question:   Release to patient     Answer:   Routine Release [[id number]]    Vitamin B12     Standing Status:   Future     Number of Occurrences:   1     Standing Expiration Date:   9/4/2025     Order Specific Question:   Release to patient     Answer:   Routine Release [[id number]]    Vitamin D,25-Hydroxy     Standing Status:   Future      Number of Occurrences:   1     Standing Expiration Date:   9/4/2025     Order Specific Question:   Release to patient     Answer:   Routine Release [[id number]]    CBC & Differential     Standing Status:   Future     Number of Occurrences:   1     Standing Expiration Date:   9/4/2025     Order Specific Question:   Manual Differential     Answer:   No     Order Specific Question:   Release to patient     Answer:   Routine Release [[id number]]     New Medications Ordered This Visit   Medications    lisinopril (PRINIVIL,ZESTRIL) 10 MG tablet     Sig: Take 1 tablet by mouth Daily.     Dispense:  90 tablet     Refill:  3    metFORMIN (GLUCOPHAGE) 1000 MG tablet     Sig: Take 1 tablet by mouth 2 (Two) Times a Day With Meals.     Dispense:  180 tablet     Refill:  3        {Time Spent (Optional):82984}  Follow Up {Instructions Charge Capture  Follow-up Communications :23}  Return in about 6 months (around 3/4/2025) for follow up.  Patient was given instructions and counseling regarding her condition or for health maintenance advice. Please see specific information pulled into the AVS if appropriate.   Kenalog Preparation: Kenalog Detail Level: Detailed Medical Necessity Clause: This procedure was medically necessary because the lesions that were treated were: inflamed, itchy, irritated. Consent: The risks of atrophy were reviewed with the patient. Administered By (Optional): Nba Nayak MD Concentration Of Solution Injected (Mg/Ml): 20.0 Treatment Number (Optional): 1 Include Z78.9 (Other Specified Conditions Influencing Health Status) As An Associated Diagnosis?: No Total Volume Injected (Ccs- Only Use Numbers And Decimals): 0.4 X Size Of Lesion In Cm (Optional): 0

## 2024-09-07 NOTE — PROGRESS NOTES
Subjective   The ABCs of the Annual Wellness Visit  Medicare Wellness Visit      Tessa Adler is a 89 y.o. patient who presents for a Medicare Wellness Visit.    The following portions of the patient's history were reviewed and   updated as appropriate: allergies, current medications, past family history, past medical history, past social history, past surgical history, and problem list.    Compared to one year ago, the patient's physical   health is worse.  Compared to one year ago, the patient's mental   health is the same.    Recent Hospitalizations:  She was not admitted to the hospital during the last year.     Current Medical Providers:  Patient Care Team:  Kishore Grande MD as PCP - General  Kishore Grande MD as PCP - Family Medicine    Outpatient Medications Prior to Visit   Medication Sig Dispense Refill    ALPRAZolam (XANAX) 0.5 MG tablet Take 1 tablet by mouth 2 (Two) Times a Day As Needed for Anxiety. for anxiety 60 tablet 3    aspirin 81 MG EC tablet Take 1 tablet by mouth Daily. Take one daily      atorvastatin (LIPITOR) 10 MG tablet Take 1 tablet by mouth Daily. 90 tablet 1    carvedilol (COREG) 6.25 MG tablet Take 1 tablet by mouth 2 (Two) Times a Day With Meals.      Cholecalciferol (VITAMIN D3) 2000 units tablet Take  by mouth. Take one daily      ezetimibe (ZETIA) 10 MG tablet Take 1 tablet by mouth Daily.      hydrOXYzine (ATARAX) 25 MG tablet Take 1 tablet by mouth 3 (Three) Times a Day As Needed for Itching. 30 tablet 5    isosorbide mononitrate (IMDUR) 30 MG 24 hr tablet Take 0.5 tablets by mouth Daily.      Lancets (freestyle) lancets Use once daily as instructed for blood sugar testing 100 each 1    Multiple Vitamins-Minerals (ICAPS AREDS 2 PO) Take 1 capsule by mouth Daily.      nitroglycerin (NITROSTAT) 0.4 MG SL tablet Place 1 tablet under the tongue As Needed for Chest Pain. Take no more than 3 doses in 15 minutes. 25 tablet 5    nystatin (MYCOSTATIN) 543307  UNIT/GM powder Apply  topically to the appropriate area as directed 2 (Two) Times a Day. 60 g 5    promethazine (PHENERGAN) 25 MG tablet Take 1 tablet by mouth Every 8 (Eight) Hours As Needed for Nausea or Vomiting. 30 tablet 1    ticagrelor (BRILINTA) 60 MG tablet tablet Take 1 tablet by mouth.      lisinopril (PRINIVIL,ZESTRIL) 10 MG tablet Take 1 tablet by mouth Daily. 90 tablet 3    metFORMIN (GLUCOPHAGE) 1000 MG tablet Take 1 tablet by mouth 2 (Two) Times a Day With Meals. 180 tablet 3    glucose blood (FREESTYLE LITE) test strip Check once daily E11.9 100 each 1    clobetasol (TEMOVATE) 0.05 % external solution Apply 1 Application topically to the appropriate area as directed 2 (Two) Times a Day. (Patient not taking: Reported on 9/4/2024)      Cyanocobalamin 1000 MCG/ML kit Inject  as directed. Take injection once a month (Patient not taking: Reported on 9/4/2024)      cyclobenzaprine (FLEXERIL) 10 MG tablet Take 1 tablet by mouth 2 (Two) Times a Day As Needed for Muscle Spasms. (Patient not taking: Reported on 9/4/2024) 60 tablet 1    erythromycin (ROMYCIN) 5 MG/GM ophthalmic ointment Administer 1 Application into the left eye 2 (Two) Times a Day. (Patient not taking: Reported on 9/4/2024)      nystatin (MYCOSTATIN) 598570 UNIT/GM cream Apply  topically to the appropriate area as directed Daily As Needed (yeast infection). (Patient not taking: Reported on 9/4/2024) 30 g 3    valACYclovir (Valtrex) 1000 MG tablet Take 1 tablet by mouth 2 (Two) Times a Day. (Patient not taking: Reported on 9/4/2024) 20 tablet 0     Facility-Administered Medications Prior to Visit   Medication Dose Route Frequency Provider Last Rate Last Admin    cyanocobalamin injection 1,000 mcg  1,000 mcg Intramuscular Q28 Days Marcus Linares MD         No opioid medication identified on active medication list. I have reviewed chart for other potential  high risk medication/s and harmful drug interactions in the elderly.      Aspirin is  "on active medication list. Aspirin use is indicated based on review of current medical condition/s. Pros and cons of this therapy have been discussed today. Benefits of this medication outweigh potential harm.  Patient has been encouraged to continue taking this medication.  .      Patient Active Problem List   Diagnosis    B12 deficiency    Mixed hyperlipidemia    GERD without esophagitis    Primary hypertension    ASHD (arteriosclerotic heart disease)    Vitamin D deficiency    CKD (chronic kidney disease), stage III    Annual physical exam    BMI 25.0-25.9,adult    Diabetic nephropathy associated with type 2 diabetes mellitus    Idiopathic osteoarthritis    Insomnia    Irritable bowel syndrome    Medicare annual wellness visit, subsequent    Osteoarthritis    Postmenopausal osteoporosis    Trigger finger of both hands    Diabetic polyneuropathy associated with type 2 diabetes mellitus    Spondylolisthesis at L4-L5 level    Yeast infection     Advance Care Planning Advance Directive is not on file.  ACP discussion was held with the patient during this visit. Patient has an advance directive (not in EMR), copy requested.            Objective   Vitals:    09/04/24 1011   BP: 108/60   BP Location: Left arm   Patient Position: Sitting   Cuff Size: Adult   Pulse: 77   Temp: 98 °F (36.7 °C)   SpO2: 98%   Weight: 58.2 kg (128 lb 3.2 oz)   Height: 160 cm (62.99\")   PainSc: 0-No pain       Estimated body mass index is 22.72 kg/m² as calculated from the following:    Height as of this encounter: 160 cm (62.99\").    Weight as of this encounter: 58.2 kg (128 lb 3.2 oz).    BMI is within normal parameters. No other follow-up for BMI required.       Does the patient have evidence of cognitive impairment? No  Lab Results   Component Value Date    TRIG 207 (H) 09/04/2024    HDL 53 09/04/2024     (H) 09/04/2024    VLDL 36 09/04/2024    HGBA1C 6.40 (H) 09/04/2024                                                                "                                 Health  Risk Assessment    Smoking Status:  Social History     Tobacco Use   Smoking Status Never   Smokeless Tobacco Never     Alcohol Consumption:  Social History     Substance and Sexual Activity   Alcohol Use No       Fall Risk Screen  STEADI Fall Risk Assessment was completed, and patient is at HIGH risk for falls. Assessment completed on:2024    Depression Screenin/4/2024    10:10 AM   PHQ-2/PHQ-9 Depression Screening   Little Interest or Pleasure in Doing Things 0-->not at all   Feeling Down, Depressed or Hopeless 0-->not at all   PHQ-9: Brief Depression Severity Measure Score 0     Health Habits and Functional and Cognitive Screenin/4/2024    10:08 AM   Functional & Cognitive Status   Do you have difficulty preparing food and eating? No   Do you have difficulty bathing yourself, getting dressed or grooming yourself? No   Do you have difficulty using the toilet? No   Do you have difficulty moving around from place to place? No   Do you have trouble with steps or getting out of a bed or a chair? Yes   Current Diet Well Balanced Diet   Dental Exam Up to date   Eye Exam Up to date   Exercise (times per week) 0 times per week   Current Exercises Include No Regular Exercise   Do you need help using the phone?  No   Are you deaf or do you have serious difficulty hearing?  No   Do you need help to go to places out of walking distance? No   Do you need help shopping? No   Do you need help preparing meals?  No   Do you need help with housework?  No   Do you need help with laundry? No   Do you need help taking your medications? No   Do you need help managing money? No   Do you ever drive or ride in a car without wearing a seat belt? No   Have you felt unusual stress, anger or loneliness in the last month? No   Who do you live with? Spouse   If you need help, do you have trouble finding someone available to you? No   Have you been bothered in the last four weeks by  sexual problems? No   Do you have difficulty concentrating, remembering or making decisions? No           Age-appropriate Screening Schedule:  Refer to the list below for future screening recommendations based on patient's age, sex and/or medical conditions. Orders for these recommended tests are listed in the plan section. The patient has been provided with a written plan.    Health Maintenance List  Health Maintenance   Topic Date Due    ZOSTER VACCINE (1 of 2) Never done    RSV Vaccine - Adults (1 - 1-dose 60+ series) Never done    DXA SCAN  08/01/2018    ANNUAL WELLNESS VISIT  08/30/2024    COVID-19 Vaccine (4 - 2023-24 season) 09/01/2024    INFLUENZA VACCINE  08/01/2024    HEMOGLOBIN A1C  03/04/2025    DIABETIC EYE EXAM  03/14/2025    LIPID PANEL  09/04/2025    URINE MICROALBUMIN  09/04/2025    TDAP/TD VACCINES (2 - Td or Tdap) 11/08/2027    Pneumococcal Vaccine 65+  Completed                                                                                                                                                CMS Preventative Services Quick Reference  Risk Factors Identified During Encounter  Caregiver fatigue.     The above risks/problems have been discussed with the patient.  Pertinent information has been shared with the patient in the After Visit Summary.  An After Visit Summary and PPPS were made available to the patient.    Follow Up:   Next Medicare Wellness visit to be scheduled in 1 year.         Additional E&M Note during same encounter follows:  Patient has additional, significant, and separately identifiable condition(s)/problem(s) that require work above and beyond the Medicare Wellness Visit     Chief Complaint  Medicare Wellness-subsequent and Diabetes    Subjective    Diabetes             The patient is an 89-year-old female who comes in for a subsequent Medicare annual wellness examination and follow-up of diabetes, hypertension, hyperlipidemia, and chronic kidney disease. She also sees  "cardiology for coronary artery disease.    She reports experiencing shortness of breath and chest pain. Despite being prescribed medication for these symptoms, she has not yet started the treatment. The chest pain, which can occur even when she is at rest, typically lasts for about 5 minutes. An episode of chest pain was triggered when she bent over to  a fallen tree branch. Initially, her doctor suspected a muscular cause, but this has since been ruled out. She last consulted with her cardiologist in June 2024.    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      No opioid medication identified on active medication list. I have reviewed chart for other potential  high risk medication/s and harmful drug interactions in the elderly.              Objective   Vital Signs:  /60 (BP Location: Left arm, Patient Position: Sitting, Cuff Size: Adult)   Pulse 77   Temp 98 °F (36.7 °C)   Ht 160 cm (62.99\")   Wt 58.2 kg (128 lb 3.2 oz)   SpO2 98%   BMI 22.72 kg/m²   Physical Exam  Vitals reviewed.   Constitutional:       Appearance: Normal appearance. She is well-developed.   HENT:      Head: Normocephalic and atraumatic.      Right Ear: Tympanic membrane and ear canal normal.      Left Ear: Tympanic membrane and ear canal normal.      Mouth/Throat:      Pharynx: Oropharynx is clear. No posterior oropharyngeal erythema.   Eyes:      Extraocular Movements: Extraocular movements intact.      Pupils: Pupils are equal, round, and reactive to light.   Neck:      Thyroid: No thyromegaly.      Vascular: No carotid bruit.   Cardiovascular:      Rate and Rhythm: Normal rate and regular rhythm.      Heart sounds: Normal heart sounds. No murmur heard.     No friction rub. No gallop.   Pulmonary:      Effort: Pulmonary effort is normal.      Breath sounds: Normal breath sounds.   Abdominal:      General: Bowel sounds are normal.      Palpations: Abdomen is soft.      Tenderness: There is no abdominal " tenderness.   Musculoskeletal:      Cervical back: Normal range of motion and neck supple.      Right lower leg: No edema.      Left lower leg: No edema.   Lymphadenopathy:      Cervical: No cervical adenopathy.   Skin:     General: Skin is warm and dry.   Neurological:      Mental Status: She is alert and oriented to person, place, and time.      Cranial Nerves: No cranial nerve deficit.   Psychiatric:         Mood and Affect: Mood normal.         Behavior: Behavior normal.                        Assessment and Plan           1. Prevention.  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 The timing of the influenza vaccine and COVID-19 vaccine was discussed.    2. Diabetes Mellitus.  A1c is pending. She will continue metformin and maintain a healthy diet. A foot exam was performed today.    3. Hypertension.  Blood pressure is controlled on carvedilol and lisinopril.    4. Coronary Artery Disease.  Management is per cardiology.    5. B12 Deficiency.  B12 level is pending.    6. Chronic Kidney Disease.  GFR is pending. The treatment remains control of blood pressure, blood glucose, and avoidance of NSAIDs.    Follow-up  The patient will follow up in 6 months.    Orders Placed This Encounter   Procedures    Comprehensive Metabolic Panel     Standing Status:   Future     Number of Occurrences:   1     Standing Expiration Date:   9/4/2025     Order Specific Question:   Release to patient     Answer:   Routine Release [[id number]]    Hemoglobin A1c     Standing Status:   Future     Number of Occurrences:   1     Standing Expiration Date:   9/4/2025     Order Specific Question:   Release to patient     Answer:   Routine Release [[id number]]    Lipid Panel     Standing Status:   Future     Number of Occurrences:   1     Standing Expiration Date:   9/4/2025     Order Specific Question:   Release to patient     Answer:   Routine Release  [[id number]]    Microalbumin / Creatinine Urine Ratio - Urine, Clean Catch     Standing Status:   Future     Number of Occurrences:   1     Standing Expiration Date:   9/4/2025     Order Specific Question:   Release to patient     Answer:   Routine Release [[id number]]    Vitamin B12     Standing Status:   Future     Number of Occurrences:   1     Standing Expiration Date:   9/4/2025     Order Specific Question:   Release to patient     Answer:   Routine Release [[id number]]    Vitamin D,25-Hydroxy     Standing Status:   Future     Number of Occurrences:   1     Standing Expiration Date:   9/4/2025     Order Specific Question:   Release to patient     Answer:   Routine Release [[id number]]    CBC & Differential     Standing Status:   Future     Number of Occurrences:   1     Standing Expiration Date:   9/4/2025     Order Specific Question:   Manual Differential     Answer:   No     Order Specific Question:   Release to patient     Answer:   Routine Release [[id number]]     New Medications Ordered This Visit   Medications    lisinopril (PRINIVIL,ZESTRIL) 10 MG tablet     Sig: Take 1 tablet by mouth Daily.     Dispense:  90 tablet     Refill:  3    metFORMIN (GLUCOPHAGE) 1000 MG tablet     Sig: Take 1 tablet by mouth 2 (Two) Times a Day With Meals.     Dispense:  180 tablet     Refill:  3          Follow Up   Return in about 6 months (around 3/4/2025) for follow up.  Patient was given instructions and counseling regarding her condition or for health maintenance advice. Please see specific information pulled into the AVS if appropriate.  Patient or patient representative verbalized consent for the use of Ambient Listening during the visit with  Kishore Grande MD for chart documentation. 9/7/2024  13:41 EDT

## 2024-11-26 RX ORDER — NYSTATIN 100000 [USP'U]/G
POWDER TOPICAL
Qty: 60 G | Refills: 0 | Status: SHIPPED | OUTPATIENT
Start: 2024-11-26

## 2024-12-26 RX ORDER — NYSTATIN 100000 [USP'U]/G
POWDER TOPICAL
Qty: 60 G | Refills: 0 | Status: SHIPPED | OUTPATIENT
Start: 2024-12-26

## 2024-12-28 ENCOUNTER — HOSPITAL ENCOUNTER (EMERGENCY)
Facility: HOSPITAL | Age: 89
Discharge: HOME | End: 2024-12-28
Attending: EMERGENCY MEDICINE
Payer: MEDICARE

## 2024-12-28 ENCOUNTER — APPOINTMENT (OUTPATIENT)
Facility: HOSPITAL | Age: 89
End: 2024-12-28
Payer: MEDICARE

## 2024-12-28 VITALS
WEIGHT: 120 LBS | HEIGHT: 63 IN | BODY MASS INDEX: 21.26 KG/M2 | TEMPERATURE: 98 F | DIASTOLIC BLOOD PRESSURE: 69 MMHG | SYSTOLIC BLOOD PRESSURE: 163 MMHG | OXYGEN SATURATION: 100 % | RESPIRATION RATE: 16 BRPM | HEART RATE: 90 BPM

## 2024-12-28 DIAGNOSIS — S86.891A PATELLAR TENDON AVULSION, RIGHT, INITIAL ENCOUNTER: ICD-10-CM

## 2024-12-28 DIAGNOSIS — S83.241A TEAR OF MEDIAL MENISCUS OF RIGHT KNEE, CURRENT, UNSPECIFIED TEAR TYPE, INITIAL ENCOUNTER: Primary | ICD-10-CM

## 2024-12-28 PROCEDURE — 73700 CT LOWER EXTREMITY W/O DYE: CPT

## 2024-12-28 PROCEDURE — 70450 CT HEAD/BRAIN W/O DYE: CPT

## 2024-12-28 PROCEDURE — 63710000001 ONDANSETRON ODT 4 MG TABLET DISPERSIBLE: Performed by: FAMILY MEDICINE

## 2024-12-28 PROCEDURE — 73721 MRI JNT OF LWR EXTRE W/O DYE: CPT

## 2024-12-28 PROCEDURE — 73560 X-RAY EXAM OF KNEE 1 OR 2: CPT

## 2024-12-28 PROCEDURE — 99284 EMERGENCY DEPT VISIT MOD MDM: CPT

## 2024-12-28 RX ORDER — ONDANSETRON 4 MG/1
4 TABLET, ORALLY DISINTEGRATING ORAL EVERY 8 HOURS PRN
Qty: 12 TABLET | Refills: 0 | Status: SHIPPED | OUTPATIENT
Start: 2024-12-28

## 2024-12-28 RX ORDER — HYDROCODONE BITARTRATE AND ACETAMINOPHEN 5; 325 MG/1; MG/1
1 TABLET ORAL EVERY 8 HOURS PRN
Qty: 12 TABLET | Refills: 0 | Status: SHIPPED | OUTPATIENT
Start: 2024-12-28

## 2024-12-28 RX ORDER — ONDANSETRON 2 MG/ML
4 INJECTION INTRAMUSCULAR; INTRAVENOUS ONCE
Status: DISCONTINUED | OUTPATIENT
Start: 2024-12-28 | End: 2024-12-28

## 2024-12-28 RX ORDER — HYDROCODONE BITARTRATE AND ACETAMINOPHEN 5; 325 MG/1; MG/1
1 TABLET ORAL ONCE
Status: COMPLETED | OUTPATIENT
Start: 2024-12-28 | End: 2024-12-28

## 2024-12-28 RX ORDER — ONDANSETRON 4 MG/1
4 TABLET, ORALLY DISINTEGRATING ORAL ONCE
Status: COMPLETED | OUTPATIENT
Start: 2024-12-28 | End: 2024-12-28

## 2024-12-28 RX ADMIN — HYDROCODONE BITARTRATE AND ACETAMINOPHEN 1 TABLET: 5; 325 TABLET ORAL at 07:09

## 2024-12-28 RX ADMIN — ONDANSETRON 4 MG: 4 TABLET, ORALLY DISINTEGRATING ORAL at 08:04

## 2024-12-28 NOTE — FSED PROVIDER NOTE
Subjective  History of Present Illness:    Pt had a mechanical fall tripping over groceries yesterday morning at 10:15. Pt struck her right knee.  Pt was walking yesterday but now is having difficulty bearing with pain in the knee.  Pt denies vision changes or vomiting      Nurses Notes reviewed and agree, including vitals, allergies, social history and prior medical history.     REVIEW OF SYSTEMS:   Review of Systems   Musculoskeletal:  Positive for joint swelling.       Past Medical History:   Diagnosis Date    ASHD (arteriosclerotic heart disease)     B12 deficiency     Rader's esophagus     Benign essential hypertension     CKD (chronic kidney disease), stage III     Diabetes mellitus without complication     GERD without esophagitis     Hemorrhoids     History of colonic polyps     Mixed hyperlipidemia     Myocardial infarction     NONSTEMI    Stress-induced cardiomyopathy 02/27/2015    Vitamin D deficiency        Allergies:    Contrast dye (echo or unknown ct/mr), Iodinated contrast media, Doxycycline, Clarithromycin, Codeine, Dobutamine, Pantoprazole, Perflutren lipid microspheres, and Sulfamethoxazole-trimethoprim      Past Surgical History:   Procedure Laterality Date    CARPAL TUNNEL RELEASE  1989    CATARACT EXTRACTION      CHOLECYSTECTOMY  2001    CORONARY ANGIOPLASTY WITH STENT PLACEMENT N/A 2010    PTCA, stents, LAD    CORONARY ANGIOPLASTY WITH STENT PLACEMENT  2015    CORONARY ANGIOPLASTY WITH STENT PLACEMENT  2020    HEMORRHOIDECTOMY  1965    HYSTERECTOMY  1968    AGE 33    OOPHORECTOMY Bilateral     AGE 33         Social History     Socioeconomic History    Marital status:    Tobacco Use    Smoking status: Never    Smokeless tobacco: Never   Vaping Use    Vaping status: Never Used   Substance and Sexual Activity    Alcohol use: No    Drug use: No    Sexual activity: Defer         Family History   Problem Relation Age of Onset    Breast cancer Neg Hx     Ovarian cancer Neg Hx   "      Objective  Physical Exam:  /69   Pulse 90   Temp 98 °F (36.7 °C) (Oral)   Resp 16   Ht 160 cm (63\")   Wt 54.4 kg (120 lb)   SpO2 100%   BMI 21.26 kg/m²      Physical Exam  Vitals and nursing note reviewed.   Constitutional:       Appearance: Normal appearance.   HENT:      Head:        Right Ear: External ear normal.      Left Ear: External ear normal.      Nose: Nose normal.      Mouth/Throat:      Mouth: Mucous membranes are moist.   Eyes:      Extraocular Movements: Extraocular movements intact.   Cardiovascular:      Rate and Rhythm: Normal rate and regular rhythm.   Pulmonary:      Effort: Pulmonary effort is normal.      Breath sounds: Normal breath sounds.   Musculoskeletal:         General: Swelling present. Normal range of motion.      Right knee: Swelling present. Tenderness present.   Skin:     General: Skin is warm and dry.   Neurological:      General: No focal deficit present.      Mental Status: She is alert.   Psychiatric:         Mood and Affect: Mood normal.         Behavior: Behavior normal.         Thought Content: Thought content normal.         Procedures    ED Course:         Lab Results (last 24 hours)       ** No results found for the last 24 hours. **             No radiology results from the last 24 hrs     MRI Knee Right Without Contrast    Result Date: 12/28/2024  Impression: 1. Large knee joint effusion again containing relatively mild amount of hemorrhage. 3.5 cm popliteal fossa cyst noted. 2. Small medial patellar avulsion, associated patellar retinacular injury, and mild medial collateral ligament strain injury. 3. Suspected nondisplaced medial meniscal tear. 4. Other, degenerative changes as noted. Electronically Signed: Jason Bales MD  12/28/2024 11:55 AM EST  Workstation ID: [id number]    CT Lower Extremity Right Without Contrast    Result Date: 12/28/2024  Impression: Large joint effusion with blood products present within the joint fluid. No definite fracture " identified. Findings are likely related to internal derangement. MRI evaluation is recommended to exclude underlying fracture. Electronically Signed: Susan Ye MD  12/28/2024 7:59 AM EST  Workstation ID: [id number]    CT Head Without Contrast    Result Date: 12/28/2024  Impression: No acute intracranial process. Electronically Signed: Susan Ye MD  12/28/2024 7:37 AM EST  Workstation ID: [id number]    XR Knee 1 or 2 View Right    Result Date: 12/28/2024  Impression: No acute osseous abnormality. Diffuse soft tissue swelling is present with a joint effusion. Mild to moderate osteoarthritic changes are present. Electronically Signed: Susan Ye MD  12/28/2024 7:33 AM EST  Workstation ID: [id number]      [facility]      Initial impression of presenting illness: Internal derangement of the knee  DDX: includes but is not limited to: Patellar fracture, tibial plateau fracture, patellar dislocation    Patient arrives ambulatory with vitals interpreted by myself.     Pertinent features from physical exam: Tenderness and decreased range of motion.    Initial diagnostic plan: Imaging          Medications   HYDROcodone-acetaminophen (NORCO) 5-325 MG per tablet 1 tablet (1 tablet Oral Given 12/28/24 0709)   ondansetron ODT (ZOFRAN-ODT) disintegrating tablet 4 mg (4 mg Oral Given 12/28/24 0804)       Results/clinical rationale were discussed with patient        Data interpreted: Nursing notes reviewed, vital signs reviewed.  Care significantly affected by Social Determinants of Health (housing and economic circumstances, unemployment)               [] Yes     [x] No              If yes, Patient's care significantly limited by  Social Determinants of Health including:              [] Inadequate housing              [] Low income              [] Alcoholism and drug addiction in family              [] Problems related to primary support group              [] Unemployment              [] Problems related to employment              []  Other Social Determinants of Health:     Case was signed out to Dr. Bales  -----  ED Disposition       ED Disposition   Discharge    Condition   Stable    Comment   --             Final diagnoses:   Tear of medial meniscus of right knee, current, unspecified tear type, initial encounter   Patellar tendon avulsion, right, initial encounter      Your Follow-Up Providers       Kishore Grande MD. Schedule an appointment as soon as possible for a visit in 2 days.    Specialty: Internal Medicine  [address]  [address]  [place] [id number]  [phone]               Go to  [facility] EMERGENCY DEPARTMENT [place].    Specialty: Emergency Medicine  Follow up details: If symptoms worsen  [address]  [facility] [address] [phone]                     Contact information for after-discharge care    Follow-up information has not been specified.                    Your medication list        START taking these medications        Instructions Last Dose Given Next Dose Due   HYDROcodone-acetaminophen 5-325 MG per tablet  Commonly known as: NORCO      Take 1 tablet by mouth Every 8 (Eight) Hours As Needed for Moderate Pain.       ondansetron ODT 4 MG disintegrating tablet  Commonly known as: ZOFRAN-ODT      Take 1 tablet by mouth Every 8 (Eight) Hours As Needed for Nausea or Vomiting.              CONTINUE taking these medications        Instructions Last Dose Given Next Dose Due   ALPRAZolam 0.5 MG tablet  Commonly known as: XANAX      Take 1 tablet by mouth 2 (Two) Times a Day As Needed for Anxiety. for anxiety       aspirin 81 MG EC tablet      Take 1 tablet by mouth Daily. Take one daily       atorvastatin 10 MG tablet  Commonly known as: LIPITOR      Take 1 tablet by mouth Daily.       carvedilol 6.25 MG tablet  Commonly known as: COREG      Take 1 tablet by mouth 2 (Two) Times a Day With Meals.       ezetimibe 10 MG tablet  Commonly known as: ZETIA      Take 1  tablet by mouth Daily.       freestyle lancets      Use once daily as instructed for blood sugar testing       FREESTYLE LITE test strip  Generic drug: glucose blood      Check once daily E11.9       hydrOXYzine 25 MG tablet  Commonly known as: ATARAX      Take 1 tablet by mouth 3 (Three) Times a Day As Needed for Itching.       ICAPS AREDS 2 PO      Take 1 capsule by mouth Daily.       lisinopril 10 MG tablet  Commonly known as: PRINIVIL,ZESTRIL      Take 1 tablet by mouth Daily.       metFORMIN 1000 MG tablet  Commonly known as: GLUCOPHAGE      Take 1 tablet by mouth 2 (Two) Times a Day With Meals.       nitroglycerin 0.4 MG SL tablet  Commonly known as: NITROSTAT      Place 1 tablet under the tongue As Needed for Chest Pain. Take no more than 3 doses in 15 minutes.       nystatin 129270 UNIT/GM powder  Commonly known as: MYCOSTATIN      APPLY  POWDER TOPICALLY TO AFFECTED AREA TWICE DAILY       promethazine 25 MG tablet  Commonly known as: PHENERGAN      Take 1 tablet by mouth Every 8 (Eight) Hours As Needed for Nausea or Vomiting.       ticagrelor 60 MG tablet tablet  Commonly known as: BRILINTA      Take 1 tablet by mouth 2 (Two) Times a Day.       Vitamin D3 50 MCG (2000 UT) tablet      Take  by mouth. Take one daily                 Where to Get Your Medications        These medications were sent to [facility] [address] [place] [address] [address] [facility] - [phone]  - [phone]  [address] [place] [address]      Phone: [phone]   HYDROcodone-acetaminophen 5-325 MG per tablet  ondansetron ODT 4 MG disintegrating tablet

## 2025-02-12 ENCOUNTER — OFFICE VISIT (OUTPATIENT)
Dept: INTERNAL MEDICINE | Facility: CLINIC | Age: 89
End: 2025-02-12
Payer: MEDICARE

## 2025-02-12 VITALS
BODY MASS INDEX: 21.44 KG/M2 | TEMPERATURE: 98.7 F | HEART RATE: 90 BPM | SYSTOLIC BLOOD PRESSURE: 130 MMHG | DIASTOLIC BLOOD PRESSURE: 80 MMHG | WEIGHT: 121 LBS | HEIGHT: 63 IN | OXYGEN SATURATION: 98 %

## 2025-02-12 DIAGNOSIS — Z76.0 MEDICATION REFILL: ICD-10-CM

## 2025-02-12 DIAGNOSIS — U07.1 COVID-19 VIRUS DETECTED: ICD-10-CM

## 2025-02-12 DIAGNOSIS — R09.89 CHEST CONGESTION: ICD-10-CM

## 2025-02-12 DIAGNOSIS — J02.9 SORE THROAT: Primary | ICD-10-CM

## 2025-02-12 DIAGNOSIS — F41.9 ANXIETY: ICD-10-CM

## 2025-02-12 LAB
EXPIRATION DATE: ABNORMAL
EXPIRATION DATE: NORMAL
FLUAV AG UPPER RESP QL IA.RAPID: NOT DETECTED
FLUBV AG UPPER RESP QL IA.RAPID: NOT DETECTED
INTERNAL CONTROL: ABNORMAL
INTERNAL CONTROL: NORMAL
Lab: ABNORMAL
Lab: NORMAL
S PYO AG THROAT QL: NEGATIVE
SARS-COV-2 AG UPPER RESP QL IA.RAPID: DETECTED

## 2025-02-12 PROCEDURE — 1160F RVW MEDS BY RX/DR IN RCRD: CPT | Performed by: STUDENT IN AN ORGANIZED HEALTH CARE EDUCATION/TRAINING PROGRAM

## 2025-02-12 PROCEDURE — 1159F MED LIST DOCD IN RCRD: CPT | Performed by: STUDENT IN AN ORGANIZED HEALTH CARE EDUCATION/TRAINING PROGRAM

## 2025-02-12 PROCEDURE — 99213 OFFICE O/P EST LOW 20 MIN: CPT | Performed by: STUDENT IN AN ORGANIZED HEALTH CARE EDUCATION/TRAINING PROGRAM

## 2025-02-12 PROCEDURE — 87880 STREP A ASSAY W/OPTIC: CPT | Performed by: STUDENT IN AN ORGANIZED HEALTH CARE EDUCATION/TRAINING PROGRAM

## 2025-02-12 PROCEDURE — 1126F AMNT PAIN NOTED NONE PRSNT: CPT | Performed by: STUDENT IN AN ORGANIZED HEALTH CARE EDUCATION/TRAINING PROGRAM

## 2025-02-12 PROCEDURE — 87428 SARSCOV & INF VIR A&B AG IA: CPT | Performed by: STUDENT IN AN ORGANIZED HEALTH CARE EDUCATION/TRAINING PROGRAM

## 2025-02-12 RX ORDER — FLUTICASONE PROPIONATE 50 MCG
2 SPRAY, SUSPENSION (ML) NASAL DAILY
Qty: 18.2 G | Refills: 0 | Status: SHIPPED | OUTPATIENT
Start: 2025-02-12

## 2025-02-12 RX ORDER — HYDROXYZINE HYDROCHLORIDE 25 MG/1
25 TABLET, FILM COATED ORAL 3 TIMES DAILY PRN
Qty: 30 TABLET | Refills: 5 | Status: SHIPPED | OUTPATIENT
Start: 2025-02-12

## 2025-02-12 RX ORDER — ALPRAZOLAM 0.5 MG
0.5 TABLET ORAL 2 TIMES DAILY PRN
Qty: 60 TABLET | Refills: 3 | Status: SHIPPED | OUTPATIENT
Start: 2025-02-12

## 2025-02-12 RX ORDER — LANOLIN ALCOHOL/MO/W.PET/CERES
1000 CREAM (GRAM) TOPICAL DAILY
COMMUNITY

## 2025-02-12 RX ORDER — ATORVASTATIN CALCIUM 10 MG/1
10 TABLET, FILM COATED ORAL DAILY
Qty: 90 TABLET | Refills: 1 | Status: SHIPPED | OUTPATIENT
Start: 2025-02-12

## 2025-02-12 NOTE — PROGRESS NOTES
Office Note     Name: Tessa Adler    : 1935     MRN: [id number]     Chief Complaint  Sore Throat and Congestion (In chest)    Subjective     History of Present Illness:  Tessa Adler is a 89 y.o. female who presents today for sore throat    History of Present Illness  The patient is being seen today for a sore throat.    She reports that her son, who was recently ill, attended a  with her on Thursday. Despite maintaining distance from him, she developed symptoms of illness the following day. Her initial symptom was a sore throat, which subsequently progressed to a general feeling of malaise. She denies any shortness of breath. She has tested positive for COVID-19. She expresses concern about the potential transmission of the virus to her children, who were in close contact with her on . She conducted a home COVID-19 test for her son on Monday, which yielded a negative result, but his condition has since deteriorated. She has received 3 doses of the COVID-19 vaccine. She has a history of a COVID-19 infection in       Review of Systems:   Review of Systems   Constitutional:  Positive for fatigue. Negative for chills and fever.   HENT:  Positive for sore throat.    Respiratory:  Negative for shortness of breath.        Past Medical History:   Past Medical History:   Diagnosis Date    ASHD (arteriosclerotic heart disease)     B12 deficiency     Rader's esophagus     Benign essential hypertension     CKD (chronic kidney disease), stage III     Diabetes mellitus without complication     GERD without esophagitis     Hemorrhoids     History of colonic polyps     Mixed hyperlipidemia     Myocardial infarction     NONSTEMI    Stress-induced cardiomyopathy 2015    Vitamin D deficiency        Past Surgical History:   Past Surgical History:   Procedure Laterality Date    CARPAL TUNNEL RELEASE      CATARACT EXTRACTION      CHOLECYSTECTOMY      CORONARY ANGIOPLASTY WITH  STENT PLACEMENT N/A 2010    PTCA, stents, LAD    CORONARY ANGIOPLASTY WITH STENT PLACEMENT  2015    CORONARY ANGIOPLASTY WITH STENT PLACEMENT  2020    HEMORRHOIDECTOMY  1965    HYSTERECTOMY  1968    AGE 33    OOPHORECTOMY Bilateral     AGE 33       Family History:   Family History   Problem Relation Age of Onset    Breast cancer Neg Hx     Ovarian cancer Neg Hx        Social History:   Social History     Socioeconomic History    Marital status:    Tobacco Use    Smoking status: Never    Smokeless tobacco: Never   Vaping Use    Vaping status: Never Used   Substance and Sexual Activity    Alcohol use: No    Drug use: No    Sexual activity: Defer       Immunizations:   Immunization History   Administered Date(s) Administered    COVID-19 (PFIZER) Purple Cap Monovalent 04/06/2021, 05/04/2021, 01/10/2022    FLUAD TRI 65YR+ 10/10/2018, 10/01/2019    Fluad Quad 65+ 10/13/2020    Fluzone High-Dose 65+YRS 10/19/2012, 09/11/2013, 09/16/2014, 12/17/2015, 10/04/2016, 11/08/2017, 10/10/2018    Fluzone High-Dose 65+yrs 09/16/2014, 12/17/2015, 10/04/2016, 11/08/2017, 10/10/2018, 10/01/2021, 10/24/2022, 10/07/2023    INFLUENZA SPLIT TRI 10/19/2012, 09/11/2013    Pneumococcal Conjugate 13-Valent (PCV13) 12/12/2014, 12/17/2015    Pneumococcal Polysaccharide (PPSV23) 04/24/2017    Tdap 11/08/2017        Medications:     Current Outpatient Medications:     ALPRAZolam (XANAX) 0.5 MG tablet, Take 1 tablet by mouth 2 (Two) Times a Day As Needed for Anxiety. for anxiety, Disp: 60 tablet, Rfl: 3    aspirin 81 MG EC tablet, Take 1 tablet by mouth Daily. Take one daily, Disp: , Rfl:     atorvastatin (LIPITOR) 10 MG tablet, Take 1 tablet by mouth Daily., Disp: 90 tablet, Rfl: 1    carvedilol (COREG) 6.25 MG tablet, Take 1 tablet by mouth 2 (Two) Times a Day With Meals., Disp: , Rfl:     Cholecalciferol (VITAMIN D3) 2000 units tablet, Take  by mouth. Take one daily, Disp: , Rfl:     ezetimibe (ZETIA) 10 MG tablet, Take 1 tablet by mouth  Daily., Disp: , Rfl:     Ferrous Sulfate (IRON PO), Take 1 tablet by mouth Daily. Pt does not know dosage, Disp: , Rfl:     glucose blood (FREESTYLE LITE) test strip, Check once daily E11.9, Disp: 100 each, Rfl: 1    hydrOXYzine (ATARAX) 25 MG tablet, Take 1 tablet by mouth 3 (Three) Times a Day As Needed for Itching., Disp: 30 tablet, Rfl: 5    Lancets (freestyle) lancets, Use once daily as instructed for blood sugar testing, Disp: 100 each, Rfl: 1    lisinopril (PRINIVIL,ZESTRIL) 10 MG tablet, Take 1 tablet by mouth Daily., Disp: 90 tablet, Rfl: 3    metFORMIN (GLUCOPHAGE) 1000 MG tablet, Take 1 tablet by mouth 2 (Two) Times a Day With Meals., Disp: 180 tablet, Rfl: 3    Multiple Vitamins-Minerals (ICAPS AREDS 2 PO), Take 1 capsule by mouth Daily., Disp: , Rfl:     nitroglycerin (NITROSTAT) 0.4 MG SL tablet, Place 1 tablet under the tongue As Needed for Chest Pain. Take no more than 3 doses in 15 minutes., Disp: 25 tablet, Rfl: 5    nystatin (MYCOSTATIN) 168462 UNIT/GM powder, APPLY  POWDER TOPICALLY TO AFFECTED AREA TWICE DAILY, Disp: 60 g, Rfl: 0    ondansetron ODT (ZOFRAN-ODT) 4 MG disintegrating tablet, Take 1 tablet by mouth Every 8 (Eight) Hours As Needed for Nausea or Vomiting., Disp: 12 tablet, Rfl: 0    promethazine (PHENERGAN) 25 MG tablet, Take 1 tablet by mouth Every 8 (Eight) Hours As Needed for Nausea or Vomiting., Disp: 30 tablet, Rfl: 1    ticagrelor (BRILINTA) 60 MG tablet tablet, Take 1 tablet by mouth 2 (Two) Times a Day., Disp: , Rfl:     vitamin B-12 (CYANOCOBALAMIN) 1000 MCG tablet, Take 1 tablet by mouth Daily., Disp: , Rfl:     fluticasone (FLONASE) 50 MCG/ACT nasal spray, Administer 2 sprays into the nostril(s) as directed by provider Daily., Disp: 18.2 g, Rfl: 0    HYDROcodone-acetaminophen (NORCO) 5-325 MG per tablet, Take 1 tablet by mouth Every 8 (Eight) Hours As Needed for Moderate Pain. (Patient not taking: Reported on 2/12/2025), Disp: 12 tablet, Rfl: 0    Allergies:   Allergies  "  Allergen Reactions    Contrast Dye (Echo Or Unknown Ct/Mr) Anaphylaxis    Iodinated Contrast Media Swelling     Tongue swelling      Doxycycline Diarrhea and Nausea And Vomiting    Clarithromycin Unknown (See Comments)     Unknown    Codeine Unknown (See Comments)     unknown    Dobutamine Nausea Only and Unknown (See Comments)     unknown    Pantoprazole Unknown (See Comments)     Unknown    Perflutren Lipid Microspheres Other (See Comments)     Unknown  Definity     Sulfamethoxazole-Trimethoprim Unknown (See Comments)       Objective     Vital Signs  /80 (BP Location: Left arm, Patient Position: Sitting, Cuff Size: Adult)   Pulse 90   Temp 98.7 °F (37.1 °C) (Infrared)   Ht 160 cm (62.99\")   Wt 54.9 kg (121 lb)   SpO2 98%   BMI 21.44 kg/m²   Body mass index is 21.44 kg/m².     BMI is within normal parameters. No other follow-up for BMI required.       Physical Exam  Constitutional:       Appearance: Normal appearance.   HENT:      Head: Normocephalic and atraumatic.   Eyes:      Extraocular Movements: Extraocular movements intact.      Conjunctiva/sclera: Conjunctivae normal.   Pulmonary:      Effort: Pulmonary effort is normal. No respiratory distress.      Breath sounds: Normal breath sounds.   Neurological:      General: No focal deficit present.      Mental Status: She is alert and oriented to person, place, and time.   Psychiatric:         Mood and Affect: Mood normal.         Behavior: Behavior normal.          Results:  Recent Results (from the past 24 hours)   POC Rapid Strep A    Collection Time: 02/12/25  9:36 AM    Specimen: Swab   Result Value Ref Range    Rapid Strep A Screen Negative Negative, VALID, INVALID, Not Performed    Internal Control Passed Passed    Lot Number [id number]     Expiration Date 3,062,027    POCT SARS-CoV-2 + Flu Antigen ANA    Collection Time: 02/12/25  9:37 AM    Specimen: Swab   Result Value Ref Range    SARS Antigen Detected (A) Not Detected, Presumptive Negative "    Influenza A Antigen ANA Not Detected Not Detected    Influenza B Antigen ANA Not Detected Not Detected    Internal Control Passed Passed    Lot Number [id number]     Expiration Date 11,142,025         Assessment and Plan     Assessment/Plan:  Diagnoses and all orders for this visit:    1. Sore throat (Primary)  -     POC Rapid Strep A  -     fluticasone (FLONASE) 50 MCG/ACT nasal spray; Administer 2 sprays into the nostril(s) as directed by provider Daily.  Dispense: 18.2 g; Refill: 0    2. Chest congestion  -     POCT SARS-CoV-2 + Flu Antigen ANA    3. COVID-19 virus detected  -Patient tested positive for COVID in clinic.  Unfortunately Paxlovid is contraindicated due to its interaction with her Brilinta.  At this time her symptoms appear to be fairly mild without any shortness of breath.  Advised supportive therapy including rest, hydration, over-the-counter Tylenol for fevers or bodyaches.  Will also prescribe Flonase for congestion and postnasal drip.  -Please represent to clinic for additional evaluation if beginning to experience worsening shortness of breath.    4. Anxiety  -Chronic, patient requesting refill of her Xanax today.  Last filled 12/28/2024.  -     ALPRAZolam (XANAX) 0.5 MG tablet; Take 1 tablet by mouth 2 (Two) Times a Day As Needed for Anxiety. for anxiety  Dispense: 60 tablet; Refill: 3    5. Medication refill  -Patient requesting medication refills.  -     atorvastatin (LIPITOR) 10 MG tablet; Take 1 tablet by mouth Daily.  Dispense: 90 tablet; Refill: 1  -     hydrOXYzine (ATARAX) 25 MG tablet; Take 1 tablet by mouth 3 (Three) Times a Day As Needed for Itching.  Dispense: 30 tablet; Refill: 5          Follow Up  No follow-ups on file.    Patient or patient representative verbalized consent for the use of Ambient Listening during the visit with  Catherine Art MD for chart documentation. 2/12/2025  11:21 LEATHA Art MD   [facility] [place] [id number]

## 2025-03-14 ENCOUNTER — LAB (OUTPATIENT)
Dept: LAB | Facility: HOSPITAL | Age: 89
End: 2025-03-14
Payer: MEDICARE

## 2025-03-14 ENCOUNTER — OFFICE VISIT (OUTPATIENT)
Dept: INTERNAL MEDICINE | Facility: CLINIC | Age: 89
End: 2025-03-14
Payer: MEDICARE

## 2025-03-14 VITALS
OXYGEN SATURATION: 99 % | SYSTOLIC BLOOD PRESSURE: 150 MMHG | TEMPERATURE: 97.5 F | HEIGHT: 63 IN | WEIGHT: 123 LBS | BODY MASS INDEX: 21.79 KG/M2 | HEART RATE: 93 BPM | DIASTOLIC BLOOD PRESSURE: 80 MMHG

## 2025-03-14 DIAGNOSIS — F41.9 ANXIETY: ICD-10-CM

## 2025-03-14 DIAGNOSIS — E11.21 DIABETIC NEPHROPATHY ASSOCIATED WITH TYPE 2 DIABETES MELLITUS: Primary | ICD-10-CM

## 2025-03-14 DIAGNOSIS — I10 PRIMARY HYPERTENSION: ICD-10-CM

## 2025-03-14 DIAGNOSIS — E11.21 DIABETIC NEPHROPATHY ASSOCIATED WITH TYPE 2 DIABETES MELLITUS: ICD-10-CM

## 2025-03-14 DIAGNOSIS — N18.31 STAGE 3A CHRONIC KIDNEY DISEASE: ICD-10-CM

## 2025-03-14 DIAGNOSIS — E78.2 MIXED HYPERLIPIDEMIA: ICD-10-CM

## 2025-03-14 DIAGNOSIS — E55.9 VITAMIN D DEFICIENCY: ICD-10-CM

## 2025-03-14 LAB
25(OH)D3 SERPL-MCNC: 23.7 NG/ML (ref 30–100)
ALBUMIN SERPL-MCNC: 3.8 G/DL (ref 3.5–5.2)
ALBUMIN/GLOB SERPL: 1 G/DL
ALP SERPL-CCNC: 68 U/L (ref 39–117)
ALT SERPL W P-5'-P-CCNC: 5 U/L (ref 1–33)
ANION GAP SERPL CALCULATED.3IONS-SCNC: 10.4 MMOL/L (ref 5–15)
AST SERPL-CCNC: 13 U/L (ref 1–32)
BILIRUB SERPL-MCNC: <0.2 MG/DL (ref 0–1.2)
BUN SERPL-MCNC: 19 MG/DL (ref 8–23)
BUN/CREAT SERPL: 16.7 (ref 7–25)
CALCIUM SPEC-SCNC: 10.1 MG/DL (ref 8.6–10.5)
CHLORIDE SERPL-SCNC: 104 MMOL/L (ref 98–107)
CHOLEST SERPL-MCNC: 238 MG/DL (ref 0–200)
CO2 SERPL-SCNC: 22.6 MMOL/L (ref 22–29)
CREAT SERPL-MCNC: 1.14 MG/DL (ref 0.57–1)
EGFRCR SERPLBLD CKD-EPI 2021: 46.7 ML/MIN/1.73
GLOBULIN UR ELPH-MCNC: 3.8 GM/DL
GLUCOSE SERPL-MCNC: 100 MG/DL (ref 65–99)
HBA1C MFR BLD: 6.2 % (ref 4.8–5.6)
HDLC SERPL-MCNC: 52 MG/DL (ref 40–60)
LDLC SERPL CALC-MCNC: 137 MG/DL (ref 0–100)
LDLC/HDLC SERPL: 2.53 {RATIO}
POTASSIUM SERPL-SCNC: 4.5 MMOL/L (ref 3.5–5.2)
PROT SERPL-MCNC: 7.6 G/DL (ref 6–8.5)
SODIUM SERPL-SCNC: 137 MMOL/L (ref 136–145)
TRIGL SERPL-MCNC: 273 MG/DL (ref 0–150)
VLDLC SERPL-MCNC: 49 MG/DL (ref 5–40)

## 2025-03-14 PROCEDURE — 80053 COMPREHEN METABOLIC PANEL: CPT

## 2025-03-14 PROCEDURE — 80061 LIPID PANEL: CPT

## 2025-03-14 PROCEDURE — 82306 VITAMIN D 25 HYDROXY: CPT

## 2025-03-14 PROCEDURE — 83036 HEMOGLOBIN GLYCOSYLATED A1C: CPT

## 2025-03-14 RX ORDER — ONDANSETRON 4 MG/1
4 TABLET, ORALLY DISINTEGRATING ORAL EVERY 8 HOURS PRN
Qty: 30 TABLET | Refills: 2 | Status: SHIPPED | OUTPATIENT
Start: 2025-03-14

## 2025-03-14 NOTE — PROGRESS NOTES
[facility]     Tessa Adler  1935   [id number]      Patient Care Team:  Kishore Grande MD as PCP - General  Kishore Grande MD as PCP - Family Medicine    Chief Complaint::   Chief Complaint   Patient presents with    Hyperlipidemia    Hypertension    Diabetes        HPI  History of Present Illness  The patient is an 89-year-old female who presents for follow-up of diabetes, chronic kidney disease, hypertension, hyperlipidemia, and anxiety.    She reports a general sense of well-being but acknowledges that her self-care has been neglected due to the recent loss of her . She anticipates that her upcoming blood work may reveal abnormalities. She has been experiencing sleep disturbances, waking up every 2 hours, a pattern established over the past 2.5 years while caring for her . She finds it challenging to return to sleep, often only managing 45 minutes of rest before needing to attend to her 's needs again. She expresses feelings of guilt when leaving the house, a sentiment she discussed with her daughter yesterday. She recalls the stress of balancing her 's care with other responsibilities, including caring for a family member with leg neuropathy. She describes a constant rush, always anxious about what she might find upon returning home. She is trying to manage her grief, attempting to stay busy and avoid excessive crying. She has previously attended a grief counseling group at [facility] but found it distressing. She finds significant support from her daughter. Despite taking Xanax once daily at bedtime, her sleep remains fragmented.    Supplemental Information  She contracted COVID-19 on 02/11/2025, which required a 14-day recovery period.    MEDICATIONS  Current: Metformin, atorvastatin, ezetimibe, vitamin D supplementation, alprazolam.      Chronic Conditions:      Patient Active Problem List   Diagnosis    B12 deficiency    Mixed  hyperlipidemia    GERD without esophagitis    Primary hypertension    ASHD (arteriosclerotic heart disease)    Vitamin D deficiency    CKD (chronic kidney disease), stage III    Annual physical exam    BMI 25.0-25.9,adult    Diabetic nephropathy associated with type 2 diabetes mellitus    Idiopathic osteoarthritis    Insomnia    Irritable bowel syndrome    Medicare annual wellness visit, subsequent    Osteoarthritis    Postmenopausal osteoporosis    Trigger finger of both hands    Diabetic polyneuropathy associated with type 2 diabetes mellitus    Spondylolisthesis at L4-L5 level    Yeast infection        Past Medical History:   Diagnosis Date    ASHD (arteriosclerotic heart disease)     B12 deficiency     Rader's esophagus     Benign essential hypertension     CKD (chronic kidney disease), stage III     Diabetes mellitus without complication     GERD without esophagitis     Hemorrhoids     History of colonic polyps     Mixed hyperlipidemia     Myocardial infarction     NONSTEMI    Stress-induced cardiomyopathy 02/27/2015    Vitamin D deficiency        Past Surgical History:   Procedure Laterality Date    CARPAL TUNNEL RELEASE  1989    CATARACT EXTRACTION      CHOLECYSTECTOMY  2001    CORONARY ANGIOPLASTY WITH STENT PLACEMENT N/A 2010    PTCA, stents, LAD    CORONARY ANGIOPLASTY WITH STENT PLACEMENT  2015    CORONARY ANGIOPLASTY WITH STENT PLACEMENT  2020    HEMORRHOIDECTOMY  1965    HYSTERECTOMY  1968    AGE 33    OOPHORECTOMY Bilateral     AGE 33       Family History   Problem Relation Age of Onset    Breast cancer Neg Hx     Ovarian cancer Neg Hx        Social History     Socioeconomic History    Marital status:    Tobacco Use    Smoking status: Never    Smokeless tobacco: Never   Vaping Use    Vaping status: Never Used   Substance and Sexual Activity    Alcohol use: No    Drug use: No    Sexual activity: Defer       Allergies   Allergen Reactions    Contrast Dye (Echo Or Unknown Ct/Mr) Anaphylaxis     Iodinated Contrast Media Swelling     Tongue swelling      Doxycycline Diarrhea and Nausea And Vomiting    Clarithromycin Unknown (See Comments)     Unknown    Codeine Unknown (See Comments)     unknown    Dobutamine Nausea Only and Unknown (See Comments)     unknown    Pantoprazole Unknown (See Comments)     Unknown    Perflutren Lipid Microspheres Other (See Comments)     Unknown  Definity     Sulfamethoxazole-Trimethoprim Unknown (See Comments)         Current Outpatient Medications:     ALPRAZolam (XANAX) 0.5 MG tablet, Take 1 tablet by mouth 2 (Two) Times a Day As Needed for Anxiety. for anxiety, Disp: 60 tablet, Rfl: 3    aspirin 81 MG EC tablet, Take 1 tablet by mouth Daily. Take one daily, Disp: , Rfl:     atorvastatin (LIPITOR) 10 MG tablet, Take 1 tablet by mouth Daily., Disp: 90 tablet, Rfl: 1    carvedilol (COREG) 6.25 MG tablet, Take 1 tablet by mouth 2 (Two) Times a Day With Meals., Disp: , Rfl:     Cholecalciferol (VITAMIN D3) 2000 units tablet, Take  by mouth. Take one daily, Disp: , Rfl:     ezetimibe (ZETIA) 10 MG tablet, Take 1 tablet by mouth Daily., Disp: , Rfl:     Ferrous Sulfate (IRON PO), Take 1 tablet by mouth Daily. Pt does not know dosage, Disp: , Rfl:     fluticasone (FLONASE) 50 MCG/ACT nasal spray, Administer 2 sprays into the nostril(s) as directed by provider Daily., Disp: 18.2 g, Rfl: 0    glucose blood (FREESTYLE LITE) test strip, Check once daily E11.9, Disp: 100 each, Rfl: 1    hydrOXYzine (ATARAX) 25 MG tablet, Take 1 tablet by mouth 3 (Three) Times a Day As Needed for Itching., Disp: 30 tablet, Rfl: 5    Lancets (freestyle) lancets, Use once daily as instructed for blood sugar testing, Disp: 100 each, Rfl: 1    lisinopril (PRINIVIL,ZESTRIL) 10 MG tablet, Take 1 tablet by mouth Daily., Disp: 90 tablet, Rfl: 3    metFORMIN (GLUCOPHAGE) 1000 MG tablet, Take 1 tablet by mouth 2 (Two) Times a Day With Meals., Disp: 180 tablet, Rfl: 3    Multiple Vitamins-Minerals (ICAPS AREDS 2 PO),  "Take 1 capsule by mouth Daily., Disp: , Rfl:     nitroglycerin (NITROSTAT) 0.4 MG SL tablet, Place 1 tablet under the tongue As Needed for Chest Pain. Take no more than 3 doses in 15 minutes., Disp: 25 tablet, Rfl: 5    nystatin (MYCOSTATIN) 925673 UNIT/GM powder, APPLY  POWDER TOPICALLY TO AFFECTED AREA TWICE DAILY, Disp: 60 g, Rfl: 0    ondansetron ODT (ZOFRAN-ODT) 4 MG disintegrating tablet, Take 1 tablet by mouth Every 8 (Eight) Hours As Needed for Nausea or Vomiting., Disp: 30 tablet, Rfl: 2    promethazine (PHENERGAN) 25 MG tablet, Take 1 tablet by mouth Every 8 (Eight) Hours As Needed for Nausea or Vomiting., Disp: 30 tablet, Rfl: 1    ticagrelor (BRILINTA) 60 MG tablet tablet, Take 1 tablet by mouth 2 (Two) Times a Day., Disp: , Rfl:     vitamin B-12 (CYANOCOBALAMIN) 1000 MCG tablet, Take 1 tablet by mouth Daily., Disp: , Rfl:     Review of Systems   Constitutional: Negative.    Respiratory: Negative.  Negative for chest tightness and shortness of breath.    Cardiovascular: Negative.  Negative for chest pain.   Gastrointestinal:  Negative for abdominal pain, blood in stool, constipation and diarrhea.        Vital Signs  Vitals:    03/14/25 0953   BP: 150/80   BP Location: Left arm   Patient Position: Sitting   Cuff Size: Adult   Pulse: 93   Temp: 97.5 °F (36.4 °C)   TempSrc: Infrared   SpO2: 99%   Weight: 55.8 kg (123 lb)   Height: 160 cm (62.99\")   PainSc: 2    PainLoc: Back       Physical Exam  Vitals reviewed.   Constitutional:       Appearance: Normal appearance. She is well-developed.   HENT:      Head: Normocephalic and atraumatic.   Neck:      Thyroid: No thyromegaly.      Vascular: No carotid bruit.   Cardiovascular:      Rate and Rhythm: Normal rate and regular rhythm.      Heart sounds: Normal heart sounds. No murmur heard.     No friction rub. No gallop.   Pulmonary:      Effort: Pulmonary effort is normal.      Breath sounds: Normal breath sounds.   Musculoskeletal:      Cervical back: Normal " range of motion and neck supple.      Right lower leg: No edema.      Left lower leg: No edema.   Lymphadenopathy:      Cervical: No cervical adenopathy.   Skin:     General: Skin is warm and dry.   Neurological:      Mental Status: She is alert and oriented to person, place, and time.      Cranial Nerves: No cranial nerve deficit.   Psychiatric:         Mood and Affect: Mood normal.         Behavior: Behavior normal.        Physical Exam      Procedures    ACE III MINI        Results  Laboratory Studies  Last A1c was 6.4%.           Assessment/Plan:    Diagnoses and all orders for this visit:    1. Diabetic nephropathy associated with type 2 diabetes mellitus (Primary)  -     Comprehensive Metabolic Panel; Future  -     Hemoglobin A1c; Future    2. Stage 3a chronic kidney disease    3. Primary hypertension    4. Mixed hyperlipidemia  -     Lipid Panel; Future    5. Vitamin D deficiency  -     Vitamin D,25-Hydroxy; Future    6. Anxiety    Other orders  -     ondansetron ODT (ZOFRAN-ODT) 4 MG disintegrating tablet; Take 1 tablet by mouth Every 8 (Eight) Hours As Needed for Nausea or Vomiting.  Dispense: 30 tablet; Refill: 2      Assessment & Plan  1. Diabetes Mellitus.  Her A1c results are currently pending, with the most recent A1c recorded at 6.4%. She reports experiencing numbness in her toes and heels, which is likely indicative of diabetic neuropathy. She will continue her regimen of metformin and maintain a healthy diet.    2. Chronic Kidney Disease.  Her GFR results are currently pending. The current treatment plan involves the management of blood glucose and blood pressure levels, and the avoidance of NSAIDs.    3. Hyperlipidemia.  Her lipid panel results are currently pending. She will continue her regimen of atorvastatin and ezetimibe.    4. Vitamin D Deficiency.  Her vitamin D level results are currently pending. She will continue her vitamin D supplementation.    5. Anxiety.  In addition to her baseline  anxiety, she is currently grieving the loss of her . She has been encouraged to consider grief counseling. She is taking alprazolam 0.25 mg at night.    Follow-up  The patient will follow up in 6 months.      Plan of care reviewed with patient at the conclusion of today's visit. Education was provided regarding diagnosis, management, and any prescribed or recommended OTC medications.Patient verbalizes understanding of and agreement with management plan.     Patient or patient representative verbalized consent for the use of Ambient Listening during the visit with  Kishore Grande MD for chart documentation. 3/16/2025  17:30 EDT        Kishore Grande MD

## 2025-07-15 ENCOUNTER — OFFICE VISIT (OUTPATIENT)
Dept: INTERNAL MEDICINE | Facility: CLINIC | Age: 89
End: 2025-07-15
Payer: MEDICARE

## 2025-07-15 ENCOUNTER — TELEPHONE (OUTPATIENT)
Dept: INTERNAL MEDICINE | Facility: CLINIC | Age: 89
End: 2025-07-15
Payer: MEDICARE

## 2025-07-15 VITALS
SYSTOLIC BLOOD PRESSURE: 130 MMHG | HEART RATE: 68 BPM | TEMPERATURE: 98.4 F | BODY MASS INDEX: 22.33 KG/M2 | DIASTOLIC BLOOD PRESSURE: 80 MMHG | WEIGHT: 126 LBS

## 2025-07-15 DIAGNOSIS — J06.9 VIRAL URI: Primary | ICD-10-CM

## 2025-07-15 DIAGNOSIS — J02.9 SORE THROAT: ICD-10-CM

## 2025-07-15 DIAGNOSIS — R05.1 ACUTE COUGH: ICD-10-CM

## 2025-07-15 LAB
EXPIRATION DATE: NORMAL
EXPIRATION DATE: NORMAL
FLUAV AG UPPER RESP QL IA.RAPID: NOT DETECTED
FLUBV AG UPPER RESP QL IA.RAPID: NOT DETECTED
INTERNAL CONTROL: NORMAL
INTERNAL CONTROL: NORMAL
Lab: NORMAL
Lab: NORMAL
S PYO AG THROAT QL: NEGATIVE
SARS-COV-2 AG UPPER RESP QL IA.RAPID: NOT DETECTED

## 2025-07-15 PROCEDURE — 87428 SARSCOV & INF VIR A&B AG IA: CPT

## 2025-07-15 PROCEDURE — 1125F AMNT PAIN NOTED PAIN PRSNT: CPT

## 2025-07-15 PROCEDURE — 87880 STREP A ASSAY W/OPTIC: CPT

## 2025-07-15 PROCEDURE — 99213 OFFICE O/P EST LOW 20 MIN: CPT

## 2025-07-15 RX ORDER — EVOLOCUMAB 140 MG/ML
140 INJECTION, SOLUTION SUBCUTANEOUS
COMMUNITY
Start: 2025-05-06

## 2025-07-15 RX ORDER — BENZONATATE 100 MG/1
100 CAPSULE ORAL 3 TIMES DAILY PRN
Qty: 21 CAPSULE | Refills: 0 | Status: SHIPPED | OUTPATIENT
Start: 2025-07-15 | End: 2025-07-22

## 2025-07-15 NOTE — PROGRESS NOTES
Acute Office Visit      Date: 07/15/2025   Patient Name: Tessa Adler  : 1935   MRN: [id number]     Chief Complaint:    Chief Complaint   Patient presents with    Cough    Sore Throat    Nausea     History of Present Illness  The patient is an 89-year-old female who presents today with a sore throat, cough, and concerns of COVID-19.    She began experiencing symptoms on  at 3:00 AM, which included a sore throat, a painful lump, and a persistent cough throughout the night. She also reported nausea upon waking up. She denies congestion and a runny nose. The severity of her sore throat disrupted her sleep. She does not take any daily allergy medication and has discontinued Flonase. She reports no fever or chills. She does not experience any sinus pressure but did have a headache this morning. She has been using warm tea with honey and throat lozenges for relief. She had a severe coughing episode last night that left her chest feeling sore. She has previously used Tessalon Perles and is requesting Claritin. She has some Flonase left at home. She is concerned about potential exposure to strep from her daughter.        Subjective      I have reviewed the patients family history, social history, past medical history, past surgical history and have updated it as appropriate.     Medications:     Current Outpatient Medications:     ALPRAZolam (XANAX) 0.5 MG tablet, Take 1 tablet by mouth 2 (Two) Times a Day As Needed for Anxiety. for anxiety, Disp: 60 tablet, Rfl: 3    aspirin 81 MG EC tablet, Take 1 tablet by mouth Daily. Take one daily, Disp: , Rfl:     carvedilol (COREG) 6.25 MG tablet, Take 1 tablet by mouth 2 (Two) Times a Day With Meals., Disp: , Rfl:     Cholecalciferol (VITAMIN D3) 2000 units tablet, Take  by mouth. Take one daily, Disp: , Rfl:     ezetimibe (ZETIA) 10 MG tablet, Take 1 tablet by mouth Daily., Disp: , Rfl:     Ferrous Sulfate (IRON PO), Take 1 tablet by mouth Daily. Pt does  not know dosage, Disp: , Rfl:     fluticasone (FLONASE) 50 MCG/ACT nasal spray, Administer 2 sprays into the nostril(s) as directed by provider Daily., Disp: 18.2 g, Rfl: 0    glucose blood (FREESTYLE LITE) test strip, Check once daily E11.9, Disp: 100 each, Rfl: 1    hydrOXYzine (ATARAX) 25 MG tablet, Take 1 tablet by mouth 3 (Three) Times a Day As Needed for Itching., Disp: 30 tablet, Rfl: 5    Lancets (freestyle) lancets, Use once daily as instructed for blood sugar testing, Disp: 100 each, Rfl: 1    lisinopril (PRINIVIL,ZESTRIL) 10 MG tablet, Take 1 tablet by mouth Daily., Disp: 90 tablet, Rfl: 3    metFORMIN (GLUCOPHAGE) 1000 MG tablet, Take 1 tablet by mouth 2 (Two) Times a Day With Meals., Disp: 180 tablet, Rfl: 3    Multiple Vitamins-Minerals (ICAPS AREDS 2 PO), Take 1 capsule by mouth Daily., Disp: , Rfl:     nitroglycerin (NITROSTAT) 0.4 MG SL tablet, Place 1 tablet under the tongue As Needed for Chest Pain. Take no more than 3 doses in 15 minutes., Disp: 25 tablet, Rfl: 5    nystatin (MYCOSTATIN) 102129 UNIT/GM powder, APPLY  POWDER TOPICALLY TO AFFECTED AREA TWICE DAILY, Disp: 60 g, Rfl: 0    ondansetron ODT (ZOFRAN-ODT) 4 MG disintegrating tablet, Take 1 tablet by mouth Every 8 (Eight) Hours As Needed for Nausea or Vomiting., Disp: 30 tablet, Rfl: 2    Repatha SureClick solution auto-injector SureClick injection, Inject 1 mL under the skin into the appropriate area as directed Every 14 (Fourteen) Days., Disp: , Rfl:     ticagrelor (BRILINTA) 60 MG tablet tablet, Take 1 tablet by mouth 2 (Two) Times a Day., Disp: , Rfl:     vitamin B-12 (CYANOCOBALAMIN) 1000 MCG tablet, Take 1 tablet by mouth Daily., Disp: , Rfl:     atorvastatin (LIPITOR) 10 MG tablet, Take 1 tablet by mouth Daily. (Patient not taking: Reported on 7/15/2025), Disp: 90 tablet, Rfl: 1    benzonatate (Tessalon Perles) 100 MG capsule, Take 1 capsule by mouth 3 (Three) Times a Day As Needed for Cough for up to 7 days., Disp: 21 capsule, Rfl:  0    promethazine (PHENERGAN) 25 MG tablet, Take 1 tablet by mouth Every 8 (Eight) Hours As Needed for Nausea or Vomiting. (Patient not taking: Reported on 7/15/2025), Disp: 30 tablet, Rfl: 1    Allergies:   Allergies   Allergen Reactions    Contrast Dye (Echo Or Unknown Ct/Mr) Anaphylaxis    Iodinated Contrast Media Swelling     Tongue swelling      Doxycycline Diarrhea and Nausea And Vomiting    Lidocaine Swelling    Clarithromycin Unknown (See Comments)     Unknown    Codeine Unknown (See Comments)     unknown    Dobutamine Nausea Only and Unknown (See Comments)     unknown    Pantoprazole Unknown (See Comments)     Unknown    Perflutren Lipid Microspheres Other (See Comments)     Unknown  Definity     Sulfamethoxazole-Trimethoprim Unknown (See Comments)       Objective     Physical Exam: Please see above  Vital Signs:   Vitals:    07/15/25 0929   BP: 130/80   Pulse: 68   Temp: 98.4 °F (36.9 °C)   TempSrc: Infrared   SpO2: Comment: unable to read   Weight: 57.2 kg (126 lb)     Body mass index is 22.33 kg/m².    Physical Exam  HENT:      Nose: No rhinorrhea.      Mouth/Throat:      Pharynx: Posterior oropharyngeal erythema present. No oropharyngeal exudate, uvula swelling or postnasal drip.   Pulmonary:      Breath sounds: Normal breath sounds.         Procedures      Assessment / Plan      Assessment/Plan:   Problem List Items Addressed This Visit    None  Visit Diagnoses         Viral URI    -  Primary      Acute cough        Relevant Medications    benzonatate (Tessalon Perles) 100 MG capsule    Other Relevant Orders    POCT SARS-CoV-2 + Flu Antigen ANA (Completed)      Sore throat        Relevant Orders    POC Rapid Strep A (Completed)              Assessment & Plan  1. Upper viral respiratory infection.  - Symptoms include sore throat, cough, nasal congestion, and a swollen lymph node.  - Physical examination revealed clear lungs, normal heart sounds, and a slightly red throat without pus. Negative test  results for strep, COVID-19, and influenza.  - Discussed the viral nature of the infection and the potential for it to develop into a sinus infection. Advised to monitor for green or yellow congestion and sinus pressure.  - Prescribed Tessalon Perles for cough relief, to be taken up to three times daily. -Recommended over-the-counter Tylenol for sore throat pain, warm tea with honey, throat lozenges, and daily Claritin if needed.   -Advised to use Flonase if congestion develops. If symptoms persist for more than a week or if shortness of breath occurs, a recheck is necessary.    Follow Up:   Return if symptoms worsen or fail to improve.    Patient or patient representative verbalized consent for the use of Ambient Listening during the visit with  TRISTAN Oswald for chart documentation. 7/15/2025  11:29 EDT    TRISTAN Oswald  [facility] [address]  [facility]  [address] [facility] [address] [place] [id number]  Fax [phone]  Phone [phone]

## 2025-07-18 ENCOUNTER — OFFICE VISIT (OUTPATIENT)
Dept: INTERNAL MEDICINE | Facility: CLINIC | Age: 89
End: 2025-07-18
Payer: MEDICARE

## 2025-07-18 VITALS
TEMPERATURE: 98.2 F | BODY MASS INDEX: 22.32 KG/M2 | SYSTOLIC BLOOD PRESSURE: 132 MMHG | HEART RATE: 64 BPM | HEIGHT: 63 IN | DIASTOLIC BLOOD PRESSURE: 80 MMHG | WEIGHT: 126 LBS

## 2025-07-18 DIAGNOSIS — J98.8 VIRAL RESPIRATORY INFECTION: Primary | ICD-10-CM

## 2025-07-18 DIAGNOSIS — J22 LOWER RESPIRATORY INFECTION: ICD-10-CM

## 2025-07-18 DIAGNOSIS — B97.89 VIRAL RESPIRATORY INFECTION: Primary | ICD-10-CM

## 2025-07-18 PROCEDURE — 99213 OFFICE O/P EST LOW 20 MIN: CPT | Performed by: STUDENT IN AN ORGANIZED HEALTH CARE EDUCATION/TRAINING PROGRAM

## 2025-07-18 PROCEDURE — 1125F AMNT PAIN NOTED PAIN PRSNT: CPT | Performed by: STUDENT IN AN ORGANIZED HEALTH CARE EDUCATION/TRAINING PROGRAM

## 2025-07-18 PROCEDURE — 87428 SARSCOV & INF VIR A&B AG IA: CPT | Performed by: STUDENT IN AN ORGANIZED HEALTH CARE EDUCATION/TRAINING PROGRAM

## 2025-07-18 PROCEDURE — 87880 STREP A ASSAY W/OPTIC: CPT | Performed by: STUDENT IN AN ORGANIZED HEALTH CARE EDUCATION/TRAINING PROGRAM

## 2025-07-18 PROCEDURE — 1159F MED LIST DOCD IN RCRD: CPT | Performed by: STUDENT IN AN ORGANIZED HEALTH CARE EDUCATION/TRAINING PROGRAM

## 2025-07-18 PROCEDURE — 1160F RVW MEDS BY RX/DR IN RCRD: CPT | Performed by: STUDENT IN AN ORGANIZED HEALTH CARE EDUCATION/TRAINING PROGRAM

## 2025-07-18 RX ORDER — AZITHROMYCIN 250 MG/1
TABLET, FILM COATED ORAL
Qty: 6 TABLET | Refills: 0 | Status: SHIPPED | OUTPATIENT
Start: 2025-07-18

## 2025-07-18 NOTE — PROGRESS NOTES
"Chief Complaint  Tessa Adler is a 89 y.o. female presenting for URI.     Patient has a past medical history of hypertension, hyperlipidemia, CAD, cardiomyopathy, GERD w/ Rader's esophagus, IBS, CKD 3, T2DM with neuropathy, vitamin B12 deficiency, osteoarthritis, osteoporosis and insomnia.    History of Present Illness  Symptoms started Sunday, 7/13/2025 with sore throat, cough.  She was seen 3 days ago on 7/15/2025.  COVID, flu and strep tests negative.  Prescribed Tessalon Perles, recommended Tylenol and Claritin.  Also use Flonase.    Patient is here for new evaluation.  She reports throat has worse, she is barely able to swallow water.  Left side is worse.  Also still noticed a tender nodule on the left anterior neck.  No runny nose.  Still cough, minimal phlegm.  Fatigue also feeling some achy.  No fever, no chills.  Of note she had COVID in February.  She tells me her  passed in February at age 89.  She is still mourning his loss.    She is worried about bronchitis, and tells me she usually gets Z-Omari with the symptoms.    The following portions of the patient's history were reviewed and updated as appropriate: allergies, current medications, past family history, past medical history, past social history, past surgical history, and problem list.    Objective  /80 (BP Location: Left arm, Patient Position: Sitting, Cuff Size: Adult)   Pulse 64   Temp 98.2 °F (36.8 °C) (Temporal)   Ht 160 cm (62.99\")   Wt 57.2 kg (126 lb)   BMI 22.33 kg/m²     Physical Exam  Vitals reviewed.   Constitutional:       General: She is not in acute distress.     Appearance: Normal appearance. She is ill-appearing. She is not toxic-appearing or diaphoretic.   HENT:      Head: Normocephalic and atraumatic.      Right Ear: Tympanic membrane, ear canal and external ear normal. There is no impacted cerumen.      Left Ear: Tympanic membrane, ear canal and external ear normal. There is no impacted cerumen.      " Nose: No congestion or rhinorrhea.      Mouth/Throat:      Mouth: Mucous membranes are moist.      Pharynx: Oropharynx is clear. Posterior oropharyngeal erythema present.   Eyes:      Extraocular Movements: Extraocular movements intact.      Conjunctiva/sclera: Conjunctivae normal.   Neck:      Comments: Mildly enlarged lymph node on the left side of the neck, tender  Cardiovascular:      Rate and Rhythm: Normal rate and regular rhythm.      Heart sounds: Normal heart sounds. No murmur heard.  Pulmonary:      Effort: Pulmonary effort is normal. No respiratory distress.      Breath sounds: Normal breath sounds. No wheezing.   Abdominal:      General: There is no distension.   Musculoskeletal:      Cervical back: Neck supple. Tenderness present.   Lymphadenopathy:      Cervical: Cervical adenopathy present.   Skin:     General: Skin is warm and dry.   Neurological:      Mental Status: She is alert and oriented to person, place, and time. Mental status is at baseline.   Psychiatric:         Behavior: Behavior normal.         Thought Content: Thought content normal.         Assessment/Plan   1. Viral respiratory infection  2. Lower respiratory infection  Patient is not getting better.  She is about 6 days into her infection.  Symptoms are consistent with viral infection I recommend that she wait body.  I have given her Z-Omari to stop if this does not get better within the next few days.  - POC Rapid Strep A  - POCT SARS-CoV-2 + Flu Antigen ANA  - azithromycin (Zithromax Z-Omari) 250 MG tablet; Take 2 tablets by mouth on day 1, then 1 tablet daily on days 2-5  Dispense: 6 tablet; Refill: 0      Return if symptoms worsen or fail to improve, for Next scheduled follow up.    Future Appointments         Provider Department Center    9/8/2025 9:15 AM (Arrive by 9:00 AM) Kishore Grande MD [facility] INTER[facility] MEDIC[facility]              Marcus Linares MD  Family Medicine  07/18/2025

## 2025-08-20 DIAGNOSIS — E11.21 DIABETIC NEPHROPATHY ASSOCIATED WITH TYPE 2 DIABETES MELLITUS: ICD-10-CM

## 2025-08-20 DIAGNOSIS — F41.9 ANXIETY: ICD-10-CM

## 2025-08-20 DIAGNOSIS — Z76.0 MEDICATION REFILL: ICD-10-CM

## 2025-08-20 RX ORDER — LANCETS 28 GAUGE
EACH MISCELLANEOUS
Qty: 100 EACH | Refills: 1 | Status: SHIPPED | OUTPATIENT
Start: 2025-08-20

## 2025-08-20 RX ORDER — BLOOD-GLUCOSE METER
KIT MISCELLANEOUS
Qty: 100 EACH | Refills: 1 | Status: SHIPPED | OUTPATIENT
Start: 2025-08-20

## 2025-08-20 RX ORDER — HYDROXYZINE HYDROCHLORIDE 25 MG/1
25 TABLET, FILM COATED ORAL 3 TIMES DAILY PRN
Qty: 30 TABLET | Refills: 5 | Status: SHIPPED | OUTPATIENT
Start: 2025-08-20

## 2025-08-20 RX ORDER — ALPRAZOLAM 0.5 MG
0.5 TABLET ORAL 2 TIMES DAILY PRN
Qty: 60 TABLET | Refills: 3 | Status: SHIPPED | OUTPATIENT
Start: 2025-08-20